# Patient Record
Sex: FEMALE | Race: BLACK OR AFRICAN AMERICAN | NOT HISPANIC OR LATINO | Employment: FULL TIME | URBAN - METROPOLITAN AREA
[De-identification: names, ages, dates, MRNs, and addresses within clinical notes are randomized per-mention and may not be internally consistent; named-entity substitution may affect disease eponyms.]

---

## 2017-01-02 DIAGNOSIS — F51.01 PRIMARY INSOMNIA: Primary | ICD-10-CM

## 2017-01-03 RX ORDER — ZOLPIDEM TARTRATE 10 MG/1
10 TABLET ORAL
Qty: 30 TABLET | Refills: 1 | OUTPATIENT
Start: 2017-01-03

## 2017-01-09 ENCOUNTER — TELEPHONE (OUTPATIENT)
Dept: FAMILY MEDICINE | Facility: CLINIC | Age: 52
End: 2017-01-09

## 2017-01-09 NOTE — TELEPHONE ENCOUNTER
Pt advised Stella WORKMAN has not seen her for this issue.  She has hx of bad cramping and her other provider would just do a note for her.  Advised Stella WORKMAN advised her to come in for physical in one month in June.  No appointment made.  Advised she can't do letter for problem she has not evaluated for .  Appointment made for tomorrow.  Miriam Gutierrez RN

## 2017-01-09 NOTE — TELEPHONE ENCOUNTER
Patient is requesting a note to excuse her from work yesterday she was having a lot of cramps   Please call to advice  Thank you

## 2017-01-10 ENCOUNTER — OFFICE VISIT (OUTPATIENT)
Dept: FAMILY MEDICINE | Facility: CLINIC | Age: 52
End: 2017-01-10
Payer: COMMERCIAL

## 2017-01-10 ENCOUNTER — TELEPHONE (OUTPATIENT)
Dept: FAMILY MEDICINE | Facility: CLINIC | Age: 52
End: 2017-01-10

## 2017-01-10 VITALS
SYSTOLIC BLOOD PRESSURE: 119 MMHG | DIASTOLIC BLOOD PRESSURE: 71 MMHG | HEIGHT: 66 IN | OXYGEN SATURATION: 97 % | WEIGHT: 186 LBS | BODY MASS INDEX: 29.89 KG/M2 | HEART RATE: 71 BPM | TEMPERATURE: 97.9 F

## 2017-01-10 DIAGNOSIS — Z23 NEED FOR PROPHYLACTIC VACCINATION WITH TETANUS-DIPHTHERIA (TD): ICD-10-CM

## 2017-01-10 DIAGNOSIS — F51.01 PRIMARY INSOMNIA: Primary | ICD-10-CM

## 2017-01-10 DIAGNOSIS — Z12.4 SCREENING FOR MALIGNANT NEOPLASM OF CERVIX: ICD-10-CM

## 2017-01-10 DIAGNOSIS — N94.6 DYSMENORRHEA: ICD-10-CM

## 2017-01-10 DIAGNOSIS — Z23 NEED FOR PROPHYLACTIC VACCINATION AND INOCULATION AGAINST INFLUENZA: ICD-10-CM

## 2017-01-10 DIAGNOSIS — Z00.01 ENCOUNTER FOR ROUTINE ADULT HEALTH EXAMINATION WITH ABNORMAL FINDINGS: Primary | ICD-10-CM

## 2017-01-10 DIAGNOSIS — Z13.1 SCREENING FOR DIABETES MELLITUS: ICD-10-CM

## 2017-01-10 DIAGNOSIS — Z12.11 SCREEN FOR COLON CANCER: ICD-10-CM

## 2017-01-10 PROCEDURE — 99396 PREV VISIT EST AGE 40-64: CPT | Performed by: PHYSICIAN ASSISTANT

## 2017-01-10 PROCEDURE — 87624 HPV HI-RISK TYP POOLED RSLT: CPT | Performed by: PHYSICIAN ASSISTANT

## 2017-01-10 PROCEDURE — G0145 SCR C/V CYTO,THINLAYER,RESCR: HCPCS | Performed by: PHYSICIAN ASSISTANT

## 2017-01-10 RX ORDER — ZOLPIDEM TARTRATE 10 MG/1
10 TABLET ORAL
Qty: 30 TABLET | Refills: 1 | Status: SHIPPED | OUTPATIENT
Start: 2017-01-10 | End: 2018-01-26

## 2017-01-10 NOTE — NURSING NOTE
"Chief Complaint   Patient presents with     Physical       Initial /71 mmHg  Pulse 71  Temp(Src) 97.9  F (36.6  C) (Oral)  Ht 5' 6\" (1.676 m)  Wt 186 lb (84.369 kg)  BMI 30.04 kg/m2  SpO2 97%  LMP 01/06/2017 Estimated body mass index is 30.04 kg/(m^2) as calculated from the following:    Height as of this encounter: 5' 6\" (1.676 m).    Weight as of this encounter: 186 lb (84.369 kg)..  BP completed using cuff size: shraddha Clay M.A.      "

## 2017-01-10 NOTE — Clinical Note
[facility]  [address] [facility] [address] [phone]      January 20, 2017    Benita Powers  [address] [facility] [address]    Dear Benita,  We are happy to inform you that your PAP smear result from 1/10/17 is normal.  We are now able to do a follow up test on PAP smears. The DNA test is for HPV (Human Papilloma Virus). Cervical cancer is closely linked with certain types of HPV. Your result showed no evidence of high risk HPV.  Therefore we recommend you return in 3 years for your next pap smear.  You will still need to return to the clinic every year for an annual exam and other preventive tests.  Please contact the clinic with any questions.  Sincerely,  Stella Santiago PA-C/david

## 2017-01-10 NOTE — MR AVS SNAPSHOT
After Visit Summary   1/10/2017    Benita Powers    MRN: [id number]           Patient Information     Date Of Birth          1965        Visit Information        Provider Department      1/10/2017 9:40 AM Stella Santiago PA-C [facility]        Today's Diagnoses     Encounter for routine adult health examination with abnormal findings    -  1     Dysmenorrhea         Screen for colon cancer         Screening for malignant neoplasm of cervix         Need for prophylactic vaccination and inoculation against influenza         Need for prophylactic vaccination with tetanus-diphtheria (TD)         Screening for diabetes mellitus           Care Instructions    Please return fasting for labs      Preventive Health Recommendations  Female Ages 50 - 64    Yearly exam: See your health care provider every year in order to  o Review health changes.   o Discuss preventive care.    o Review your medicines if your doctor has prescribed any.      Get a Pap test every three years (unless you have an abnormal result and your provider advises testing more often).    If you get Pap tests with HPV test, you only need to test every 5 years, unless you have an abnormal result.     You do not need a Pap test if your uterus was removed (hysterectomy) and you have not had cancer.    You should be tested each year for STDs (sexually transmitted diseases) if you're at risk.     Have a mammogram every 1 to 2 years.    Have a colonoscopy at age 50, or have a yearly FIT test (stool test). These exams screen for colon cancer.      Have a cholesterol test every 5 years, or more often if advised.    Have a diabetes test (fasting glucose) every three years. If you are at risk for diabetes, you should have this test more often.     If you are at risk for osteoporosis (brittle bone disease), think about having a bone density scan (DEXA).    Shots: Get a flu shot each year. Get a tetanus shot every 10  "years.    Nutrition:     Eat at least 5 servings of fruits and vegetables each day.    Eat whole-grain bread, whole-wheat pasta and brown rice instead of white grains and rice.    Talk to your provider about Calcium and Vitamin D.     Lifestyle    Exercise at least 150 minutes a week (30 minutes a day, 5 days a week). This will help you control your weight and prevent disease.    Limit alcohol to one drink per day.    No smoking.     Wear sunscreen to prevent skin cancer.     See your dentist every six months for an exam and cleaning.    See your eye doctor every 1 to 2 years.            Follow-ups after your visit        Future tests that were ordered for you today     Open Future Orders        Priority Expected Expires Ordered    Lipid panel reflex to direct LDL Routine  1/10/2018 1/10/2017    Comprehensive metabolic panel Routine  1/10/2018 1/10/2017            Who to contact     If you have questions or need follow up information about today's clinic visit or your schedule please contact [facility] directly at [phone].  Normal or non-critical lab and imaging results will be communicated to you by ClearDATAhart, letter or phone within 4 business days after the clinic has received the results. If you do not hear from us within 7 days, please contact the clinic through Brootat or phone. If you have a critical or abnormal lab result, we will notify you by phone as soon as possible.  Submit refill requests through Kanbanize or call your pharmacy and they will forward the refill request to us. Please allow 3 business days for your refill to be completed.          Additional Information About Your Visit        [email or web address] Information     Kanbanize lets you send messages to your doctor, view your test results, renew your prescriptions, schedule appointments and more. To sign up, go to [email or web address] . Click on \"Log in\" on the left side of the screen, which will take you to the Welcome page. Then click on " "\"Sign up Now\" on the right side of the page.     You will be asked to enter the access code listed below, as well as some personal information. Please follow the directions to create your username and password.     Your access code is: [email or web address]  Expires: 4/10/2017 10:22 AM     Your access code will  in 90 days. If you need help or a new code, please call your [facility] or [phone].        Care EveryWhere ID     This is your Care EveryWhere ID. This could be used by other organizations to access your [facility] medical records  [id number]        Your Vitals Were     Pulse Temperature Height BMI (Body Mass Index) Pulse Oximetry Last Period    71 97.9  F (36.6  C) (Oral) 5' 6\" (1.676 m) 30.04 kg/m2 97% 2017       Blood Pressure from Last 3 Encounters:   01/10/17 119/71   16 131/78    Weight from Last 3 Encounters:   01/10/17 186 lb (84.369 kg)   16 188 lb 4.8 oz (85.412 kg)              We Performed the Following     HPV High Risk Types DNA Cervical     Pap imaged thin layer screen with HPV - recommended age 30 - 65 years (select HPV order below)        Primary Care Provider Office Phone # Fax #    [facility] [phone] [address] [facility] [facility] [address]        Thank you!     Thank you for choosing [facility]  for your care. Our goal is always to provide you with excellent care. Hearing back from our patients is one way we can continue to improve our services. Please take a few minutes to complete the written survey that you may receive in the mail after your visit with us. Thank you!             Your Updated Medication List - Protect others around you: Learn how to safely use, store and throw away your medicines at www.disposemymeds.org.          This list is accurate as of: 1/10/17 10:22 AM.  Always use your most recent med list.                   Brand Name Dispense Instructions for use    albuterol 108 (90 BASE) MCG/ACT " Inhaler    PROAIR HFA/PROVENTIL HFA/VENTOLIN HFA    1 Inhaler    Inhale 2 puffs into the lungs every 6 hours as needed for shortness of breath / dyspnea or wheezing       amLODIPine 10 MG tablet    NORVASC    90 tablet    Take 1 tablet (10 mg) by mouth daily       flax seed oil 1000 MG capsule      Take 1 capsule by mouth daily       hydrochlorothiazide 25 MG tablet    HYDRODIURIL    90 tablet    Take 1 tablet (25 mg) by mouth daily       HYDROcodone-acetaminophen 5-325 MG per tablet    NORCO         lisinopril 40 MG tablet    PRINIVIL/ZESTRIL    90 tablet    Take 1 tablet (40 mg) by mouth daily       methocarbamol 500 MG tablet    ROBAXIN    60 tablet    Take 2 tablets (1,000 mg) by mouth 3 times daily as needed for muscle spasms       MULTIVITAMIN ADULT PO          OMEGA-3 FISH OIL PO          omeprazole 20 MG CR capsule    priLOSEC    90 capsule    Take 1 capsule (20 mg) by mouth daily       zolpidem 10 MG tablet    AMBIEN    30 tablet    Take 1 tablet (10 mg) by mouth nightly as needed for sleep

## 2017-01-10 NOTE — Clinical Note
[facility]  [address] [facility] [address]  Phone: [phone]    January 10, 2017        Benita Powers  [address] [facility] [address]          To whom it may concern:    RE: Benita Powers    Patient was seen and treated today at our clinic.  She may be excused from work Sunday the 8th.  She gets very bad menstrual cramping and may need to be off up to 2 full days every month as needed.     Please contact me for questions or concerns.      Sincerely,        Stella Santiago PA-C

## 2017-01-10 NOTE — PROGRESS NOTES
SUBJECTIVE:     CC: Benita Powers is an 51 year old woman who presents for preventive health visit.     Healthy Habits:    Do you get at least three servings of calcium containing foods daily (dairy, green leafy vegetables, etc.)? yes    Amount of exercise or daily activities, outside of work: two times per week    Problems taking medications regularly No    Medication side effects: No    Have you had an eye exam in the past two years? no    Do you see a dentist twice per year? yes    Do you have sleep apnea, excessive snoring or daytime drowsiness?no        Cramping with periods--needs note to be off of work.  May need FMLA but does not have that currently.  Up to 2 x per month.  Gets severe cramps, unable to leave house x 2 days. Takes several OTC to help. Has had for years.   Ob history: 4 pregnancies, 4 children.   Had mammogram last year.   Colonoscopy-had age 50.  Had diverticulosis.  Will abstract.  Had at [facility].  [facility] she thinks was location.   Due in 10 years.   Due for fasting labs. Due for pap.     Today's PHQ-2 Score:   PHQ-2 ( 1999 Pfizer) 6/17/2016   Q1: Little interest or pleasure in doing things 0   Q2: Feeling down, depressed or hopeless 0   PHQ-2 Score 0       Abuse: Current or Past(Physical, Sexual or Emotional)- No  Do you feel safe in your environment - Yes    Social History   Substance Use Topics     Smoking status: Former Smoker     Smokeless tobacco: Not on file     Alcohol Use: Yes      Comment: rare     The patient does not drink >3 drinks per day nor >7 drinks per week.    No results for input(s): CHOL, HDL, LDL, TRIG, CHOLHDLRATIO, NHDL in the last 85559 hours.    Reviewed orders with patient.  Reviewed health maintenance and updated orders accordingly - Yes    Mammo Decision Support:  Patient over age 50, mutual decision to screen reflected in health maintenance.    Pertinent mammograms are reviewed under the imaging tab.  History of abnormal Pap smear: NO - age 30- 65 PAP  "every 3 years recommended  All Histories reviewed and updated in Epic.  Past Medical History   Diagnosis Date     Hypertension      Uncomplicated asthma      High cholesterol       Past Surgical History   Procedure Laterality Date     Eye surgery       toe surgery     Obstetric History       T0      TAB0   SAB0   E0   M0   L0       # Outcome Date GA Lbr Panchito/2nd Weight Sex Delivery Anes PTL Lv   4 Para            3 Para            2 Para            1 Para                   ROS:  C: NEGATIVE for fever, chills, change in weight  I: NEGATIVE for worrisome rashes, moles or lesions  E: NEGATIVE for vision changes or irritation  ENT: NEGATIVE for ear, mouth and throat problems  R: NEGATIVE for significant cough or SOB  B: NEGATIVE for masses, tenderness or discharge  CV: NEGATIVE for chest pain, palpitations or peripheral edema  GI: NEGATIVE for nausea, abdominal pain, heartburn, or change in bowel habits  M: NEGATIVE for significant arthralgias or myalgia  N: NEGATIVE for weakness, dizziness or paresthesias  P: NEGATIVE for changes in mood or affect    Problem list, Medication list, Allergies, and Medical/Social/Surgical histories reviewed in [facility] and updated as appropriate.  OBJECTIVE:     /71 mmHg  Pulse 71  Temp(Src) 97.9  F (36.6  C) (Oral)  Ht 5' 6\" (1.676 m)  Wt 186 lb (84.369 kg)  BMI 30.04 kg/m2  SpO2 97%  LMP 2017  EXAM:  GENERAL: healthy, alert and no distress  EYES: Eyes grossly normal to inspection, PERRL and conjunctivae and sclerae normal  HENT: ear canals and TM's normal, nose and mouth without ulcers or lesions  NECK: no adenopathy, no asymmetry, masses, or scars and thyroid normal to palpation  RESP: lungs clear to auscultation - no rales, rhonchi or wheezes  BREAST: normal without masses, tenderness or nipple discharge and no palpable axillary masses or adenopathy  CV: regular rate and rhythm, normal S1 S2, no S3 or S4, no murmur, click or rub, no peripheral edema and " "peripheral pulses strong  ABDOMEN: soft, nontender, no hepatosplenomegaly, no masses and bowel sounds normal   (female): normal female external genitalia, normal urethral meatus, vaginal mucosa pink, moist, well rugated, and normal cervix/adnexa/uterus without masses or discharge  MS: no gross musculoskeletal defects noted, no edema  SKIN: no suspicious lesions or rashes  NEURO: Normal strength and tone, mentation intact and speech normal  PSYCH: mentation appears normal, affect normal/bright    ASSESSMENT/PLAN:     1. Encounter for routine adult health examination with abnormal findings    - Lipid panel reflex to direct LDL; Future  - HPV High Risk Types DNA Cervical    2. Dysmenorrhea      3. Screen for colon cancer      4. Screening for malignant neoplasm of cervix    - Pap imaged thin layer screen with HPV - recommended age 30 - 65 years (select HPV order below)    5. Need for prophylactic vaccination and inoculation against influenza      6. Need for prophylactic vaccination with tetanus-diphtheria (TD)      7. Screening for diabetes mellitus    - Comprehensive metabolic panel; Future    COUNSELING:   Reviewed preventive health counseling, as reflected in patient instructions       Regular exercise         reports that she has quit smoking. She does not have any smokeless tobacco history on file.    Estimated body mass index is 29.48 kg/(m^2) as calculated from the following:    Height as of 6/17/16: 5' 7\" (1.702 m).    Weight as of 6/17/16: 188 lb 4.8 oz (85.412 kg).   Weight management plan: Discussed healthy diet and exercise guidelines and patient will follow up in 12 months in clinic to re-evaluate.     Patient Instructions   Please return fasting for labs      Preventive Health Recommendations  Female Ages 50 - 64    Yearly exam: See your health care provider every year in order to  o Review health changes.   o Discuss preventive care.    o Review your medicines if your doctor has prescribed " any.      Get a Pap test every three years (unless you have an abnormal result and your provider advises testing more often).    If you get Pap tests with HPV test, you only need to test every 5 years, unless you have an abnormal result.     You do not need a Pap test if your uterus was removed (hysterectomy) and you have not had cancer.    You should be tested each year for STDs (sexually transmitted diseases) if you're at risk.     Have a mammogram every 1 to 2 years.    Have a colonoscopy at age 50, or have a yearly FIT test (stool test). These exams screen for colon cancer.      Have a cholesterol test every 5 years, or more often if advised.    Have a diabetes test (fasting glucose) every three years. If you are at risk for diabetes, you should have this test more often.     If you are at risk for osteoporosis (brittle bone disease), think about having a bone density scan (DEXA).    Shots: Get a flu shot each year. Get a tetanus shot every 10 years.    Nutrition:     Eat at least 5 servings of fruits and vegetables each day.    Eat whole-grain bread, whole-wheat pasta and brown rice instead of white grains and rice.    Talk to your provider about Calcium and Vitamin D.     Lifestyle    Exercise at least 150 minutes a week (30 minutes a day, 5 days a week). This will help you control your weight and prevent disease.    Limit alcohol to one drink per day.    No smoking.     Wear sunscreen to prevent skin cancer.     See your dentist every six months for an exam and cleaning.    See your eye doctor every 1 to 2 years.              Counseling Resources:  ATP IV Guidelines  Pooled Cohorts Equation Calculator  Breast Cancer Risk Calculator  FRAX Risk Assessment  ICSI Preventive Guidelines  Dietary Guidelines for Americans, 2010  USDA's MyPlate  ASA Prophylaxis  Lung CA Screening    Stella Santiago PA-C  [facility]

## 2017-01-10 NOTE — TELEPHONE ENCOUNTER
Controlled Substance Refill Request for ambien  Problem List Complete:  No     PROVIDER TO CONSIDER COMPLETION OF PROBLEM LIST AND OVERVIEW/CONTROLLED SUBSTANCE AGREEMENT    Last Written Prescription Date:  6-7-16  Last Fill Quantity: 30,   # refills: 1    Last Office Visit with [facility] primary care provider: 1-10-17    Future Office visit:     Controlled substance agreement on file: No.     Processing:  Fax Rx to [facility] pharmacy   checked in past 6 months?  Yes 1/10/17   Miriam Gutierrez RN

## 2017-01-10 NOTE — TELEPHONE ENCOUNTER
Faxed RX to [facility] in [place]. Left message for patient to call back.Sophie Martino MA/FRANCESCO

## 2017-01-10 NOTE — Clinical Note
[facility]  [address] [facility] [id number] [phone]      January 20, 2017    Benita Powers  [address] [facility] [address]    Dear Benita,  We are happy to inform you that your PAP smear result from *** is normal.  We are now able to do a follow up test on PAP smears. The DNA test is for HPV (Human Papilloma Virus). Cervical cancer is closely linked with certain types of HPV. Your result showed {HPV EVIDENCE:864513}  Therefore we recommend you return in {HPV 1-5 YEAR:291141} for your next pap smear.  You will still need to return to the clinic every year for an annual exam and other preventive tests.  Please contact the clinic with any questions.  Sincerely,  Stella Santiago PA-C

## 2017-01-10 NOTE — PATIENT INSTRUCTIONS
Please return fasting for labs      Preventive Health Recommendations  Female Ages 50 - 64    Yearly exam: See your health care provider every year in order to  o Review health changes.   o Discuss preventive care.    o Review your medicines if your doctor has prescribed any.      Get a Pap test every three years (unless you have an abnormal result and your provider advises testing more often).    If you get Pap tests with HPV test, you only need to test every 5 years, unless you have an abnormal result.     You do not need a Pap test if your uterus was removed (hysterectomy) and you have not had cancer.    You should be tested each year for STDs (sexually transmitted diseases) if you're at risk.     Have a mammogram every 1 to 2 years.    Have a colonoscopy at age 50, or have a yearly FIT test (stool test). These exams screen for colon cancer.      Have a cholesterol test every 5 years, or more often if advised.    Have a diabetes test (fasting glucose) every three years. If you are at risk for diabetes, you should have this test more often.     If you are at risk for osteoporosis (brittle bone disease), think about having a bone density scan (DEXA).    Shots: Get a flu shot each year. Get a tetanus shot every 10 years.    Nutrition:     Eat at least 5 servings of fruits and vegetables each day.    Eat whole-grain bread, whole-wheat pasta and brown rice instead of white grains and rice.    Talk to your provider about Calcium and Vitamin D.     Lifestyle    Exercise at least 150 minutes a week (30 minutes a day, 5 days a week). This will help you control your weight and prevent disease.    Limit alcohol to one drink per day.    No smoking.     Wear sunscreen to prevent skin cancer.     See your dentist every six months for an exam and cleaning.    See your eye doctor every 1 to 2 years.

## 2017-01-12 LAB
COPATH REPORT: NORMAL
PAP: NORMAL

## 2017-01-16 ENCOUNTER — TELEPHONE (OUTPATIENT)
Dept: FAMILY MEDICINE | Facility: CLINIC | Age: 52
End: 2017-01-16

## 2017-01-16 NOTE — Clinical Note
[facility]  [address] [facility] [address]  [phone]    February 1, 2017      Benita Powers  [address] [address] [facility] [place] [address]      Dear Benita,     Your clinic record indicates that you are due for an asthma update. We have a survey tool called an ACT (or Asthma Control Test) we use to measure the level of control of your asthma. Please complete the enclosed questionnaire and mail it back to us in the self-addressed stamped envelope.     If you have questions about this letter please contact your provider.     Sincerely,       Your [facility] Team

## 2017-01-16 NOTE — TELEPHONE ENCOUNTER
Patient was in to see Stella Santiago on 01- and has the diagnoses of Asthma. Patient is due for an ACT. Please complete. Thank you.

## 2017-01-19 LAB
FINAL DIAGNOSIS: NORMAL
HPV HR 12 DNA CVX QL NAA+PROBE: NEGATIVE
HPV16 DNA SPEC QL NAA+PROBE: NEGATIVE
HPV18 DNA SPEC QL NAA+PROBE: NEGATIVE
SPECIMEN DESCRIPTION: NORMAL

## 2017-01-20 DIAGNOSIS — Z13.1 SCREENING FOR DIABETES MELLITUS: ICD-10-CM

## 2017-01-20 DIAGNOSIS — Z00.01 ENCOUNTER FOR ROUTINE ADULT HEALTH EXAMINATION WITH ABNORMAL FINDINGS: ICD-10-CM

## 2017-01-20 LAB
ALBUMIN SERPL-MCNC: 3.4 G/DL (ref 3.4–5)
ALP SERPL-CCNC: 55 U/L (ref 40–150)
ALT SERPL W P-5'-P-CCNC: 19 U/L (ref 0–50)
ANION GAP SERPL CALCULATED.3IONS-SCNC: 8 MMOL/L (ref 3–14)
AST SERPL W P-5'-P-CCNC: 16 U/L (ref 0–45)
BILIRUB SERPL-MCNC: 0.3 MG/DL (ref 0.2–1.3)
BUN SERPL-MCNC: 8 MG/DL (ref 7–30)
CALCIUM SERPL-MCNC: 8.7 MG/DL (ref 8.5–10.1)
CHLORIDE SERPL-SCNC: 104 MMOL/L (ref 94–109)
CHOLEST SERPL-MCNC: 237 MG/DL
CO2 SERPL-SCNC: 26 MMOL/L (ref 20–32)
CREAT SERPL-MCNC: 0.81 MG/DL (ref 0.52–1.04)
GFR SERPL CREATININE-BSD FRML MDRD: 74 ML/MIN/1.7M2
GLUCOSE SERPL-MCNC: 88 MG/DL (ref 70–99)
HDLC SERPL-MCNC: 57 MG/DL
LDLC SERPL CALC-MCNC: 136 MG/DL
NONHDLC SERPL-MCNC: 180 MG/DL
POTASSIUM SERPL-SCNC: 3.9 MMOL/L (ref 3.4–5.3)
PROT SERPL-MCNC: 7.2 G/DL (ref 6.8–8.8)
SODIUM SERPL-SCNC: 138 MMOL/L (ref 133–144)
TRIGL SERPL-MCNC: 221 MG/DL

## 2017-01-20 PROCEDURE — 80053 COMPREHEN METABOLIC PANEL: CPT | Performed by: PHYSICIAN ASSISTANT

## 2017-01-20 PROCEDURE — 36415 COLL VENOUS BLD VENIPUNCTURE: CPT | Performed by: PHYSICIAN ASSISTANT

## 2017-01-20 PROCEDURE — 80061 LIPID PANEL: CPT | Performed by: PHYSICIAN ASSISTANT

## 2017-01-20 NOTE — Clinical Note
"[facility]  [address] [address] [phone]        January 23, 2017    Benita Powers  [address] [address]            Dear Benita,    It was a pleasure to see you at your recent office visit.  Your test results are listed below. Kidney function normal. Liver function normal. No diabetes, blood sugar normal. Your LDL or \"bad\" cholesterol was somewhat elevated at 136.  Your HDL or \"good\" cholesterol was great at 57, the higher this number the better.  Your triglycerides or \"fatty acids\" were high at 221.  To improve this,  I would recommend exercising for at least 30 minutes 5 times a week or for 50 minutes 3 times a week.  Brisk walking counts for this, it does not need to be vigorous exercise.  Also a diet high in vegetables, fruits, fiber, and whole grains will help.  If you are not currently taking a fish oil supplement (which is over-the-counter) start 1 gram fish oil supplement daily.      We will re-check your cholesterol in 12 months to assess your progress.          If you have any questions or concerns, please call the clinic at [phone].    Sincerely,  Stella Santiago PA-C    Results for orders placed or performed in visit on 01/20/17   Lipid panel reflex to direct LDL   Result Value Ref Range    Cholesterol 237 (H) <200 mg/dL    Triglycerides 221 (H) <150 mg/dL    HDL Cholesterol 57 >49 mg/dL    LDL Cholesterol Calculated 136 (H) <100 mg/dL    Non HDL Cholesterol 180 (H) <130 mg/dL   Comprehensive metabolic panel   Result Value Ref Range    Sodium 138 133 - 144 mmol/L    Potassium 3.9 3.4 - 5.3 mmol/L    Chloride 104 94 - 109 mmol/L    Carbon Dioxide 26 20 - 32 mmol/L    Anion Gap 8 3 - 14 mmol/L    Glucose 88 70 - 99 mg/dL    Urea Nitrogen 8 7 - 30 mg/dL    Creatinine 0.81 0.52 - 1.04 mg/dL    GFR Estimate 74 >60 mL/min/1.7m2    GFR Estimate If Black 90 >60 mL/min/1.7m2    Calcium 8.7 8.5 - 10.1 mg/dL    Bilirubin Total 0.3 0.2 - 1.3 mg/dL    " Albumin 3.4 3.4 - 5.0 g/dL    Protein Total 7.2 6.8 - 8.8 g/dL    Alkaline Phosphatase 55 40 - 150 U/L    ALT 19 0 - 50 U/L    AST 16 0 - 45 U/L

## 2017-01-20 NOTE — PROGRESS NOTES
"Quick Note:    Dear Benita,   It was a pleasure to see you at your recent office visit. Your test results are listed below. Kidney function normal. Liver function normal. No diabetes, blood sugar normal. Your LDL or \"bad\" cholesterol was somewhat elevated at 136. Your HDL or \"good\" cholesterol was great at 57, the higher this number the better. Your triglycerides or \"fatty acids\" were high at 221. To improve this, I would recommend exercising for at least 30 minutes 5 times a week or for 50 minutes 3 times a week. Brisk walking counts for this, it does not need to be vigorous exercise. Also a diet high in vegetables, fruits, fiber, and whole grains will help. If you are not currently taking a fish oil supplement (which is over-the-counter) start 1 gram fish oil supplement daily.     We will re-check your cholesterol in 12 months to assess your progress.       If you have any questions or concerns, please call the clinic at [phone].    Sincerely,  Stella Santiago PA-C      ______  "

## 2017-01-27 ENCOUNTER — TELEPHONE (OUTPATIENT)
Dept: FAMILY MEDICINE | Facility: CLINIC | Age: 52
End: 2017-01-27

## 2017-02-13 ENCOUNTER — TELEPHONE (OUTPATIENT)
Dept: FAMILY MEDICINE | Facility: CLINIC | Age: 52
End: 2017-02-13

## 2017-02-13 DIAGNOSIS — I10 BENIGN ESSENTIAL HYPERTENSION: ICD-10-CM

## 2017-02-13 RX ORDER — AMLODIPINE BESYLATE 10 MG/1
10 TABLET ORAL DAILY
Qty: 90 TABLET | Refills: 3 | Status: SHIPPED | OUTPATIENT
Start: 2017-02-13 | End: 2018-01-26

## 2017-02-13 RX ORDER — LISINOPRIL 40 MG/1
40 TABLET ORAL DAILY
Qty: 90 TABLET | Refills: 3 | Status: SHIPPED | OUTPATIENT
Start: 2017-02-13 | End: 2018-01-26

## 2017-02-13 NOTE — TELEPHONE ENCOUNTER
Patient is calling for refill RX: amLODIPine (NORVASC) 10 MG tablet and lisinopril (PRINIVIL/ZESTRIL) 40 MG tablet. Thank you.

## 2017-06-02 DIAGNOSIS — K21.9 GASTROESOPHAGEAL REFLUX DISEASE WITHOUT ESOPHAGITIS: ICD-10-CM

## 2017-06-02 DIAGNOSIS — F51.01 PRIMARY INSOMNIA: ICD-10-CM

## 2017-06-02 RX ORDER — ZOLPIDEM TARTRATE 10 MG/1
TABLET ORAL
Qty: 30 TABLET | Refills: 0 | OUTPATIENT
Start: 2017-06-02

## 2017-06-02 NOTE — TELEPHONE ENCOUNTER
Controlled Substance Refill Request for zolpidem  Problem List Complete:  No     PROVIDER TO CONSIDER COMPLETION OF PROBLEM LIST AND OVERVIEW/CONTROLLED SUBSTANCE AGREEMENT    Last Written Prescription Date:  1/10/17  Last Fill Quantity: 30,   # refills: 1    Last Office Visit with G primary care provider: 1/10/17    Future Office visit:     Controlled substance agreement on file: No.     Processing:  Fax Rx to [facility] pharmacy   checked in past 6 months?  Yes 1-10-17   Miriam Gutierrez RN

## 2017-09-11 ENCOUNTER — TELEPHONE (OUTPATIENT)
Dept: FAMILY MEDICINE | Facility: CLINIC | Age: 52
End: 2017-09-11

## 2017-11-14 ENCOUNTER — TELEPHONE (OUTPATIENT)
Dept: FAMILY MEDICINE | Facility: CLINIC | Age: 52
End: 2017-11-14

## 2017-11-14 NOTE — LETTER
[facility]  [address] [facility] [address]  Phone: [phone]    November 14, 2017        Benita Powers  [address], [place] [address]          To whom it may concern:    RE: Benita Powers      Patient missed work today due to medical illness. May return without restrictions as of tomorrow.     Please contact me for questions or concerns.      Sincerely,        Stella Santiago PA-C

## 2017-11-14 NOTE — TELEPHONE ENCOUNTER
Ok but patient needs OV before any more notes or things, has been almost a year since I've seen her.   Stella Santiago PA-C

## 2017-11-14 NOTE — TELEPHONE ENCOUNTER
Patient is calling to get a note for being off work today, as her [facility] paperwork  . She would like to  this note tomorrow morning. Please call to advise. Thank you

## 2017-11-14 NOTE — TELEPHONE ENCOUNTER
Called and spoke to patient. She said that she needs a note for today since she did not go to work because of menstrual cramps. Is this something you can do?Sophie Martino MA/TC

## 2018-01-22 NOTE — PATIENT INSTRUCTIONS
Do not take ambien with pain medicine (vicodin/norco).    Recheck 6 months if needing more ambien or pain medications, otherwise one year    I will follow up with labs.         Preventive Health Recommendations  Female Ages 50 - 64    Yearly exam: See your health care provider every year in order to  o Review health changes.   o Discuss preventive care.    o Review your medicines if your doctor has prescribed any.      Get a Pap test every three years (unless you have an abnormal result and your provider advises testing more often).    If you get Pap tests with HPV test, you only need to test every 5 years, unless you have an abnormal result.     You do not need a Pap test if your uterus was removed (hysterectomy) and you have not had cancer.    You should be tested each year for STDs (sexually transmitted diseases) if you're at risk.     Have a mammogram every 1 to 2 years.    Have a colonoscopy at age 50, or have a yearly FIT test (stool test). These exams screen for colon cancer.      Have a cholesterol test every 5 years, or more often if advised.    Have a diabetes test (fasting glucose) every three years. If you are at risk for diabetes, you should have this test more often.     If you are at risk for osteoporosis (brittle bone disease), think about having a bone density scan (DEXA).    Shots: Get a flu shot each year. Get a tetanus shot every 10 years.    Nutrition:     Eat at least 5 servings of fruits and vegetables each day.    Eat whole-grain bread, whole-wheat pasta and brown rice instead of white grains and rice.    Talk to your provider about Calcium and Vitamin D.     Lifestyle    Exercise at least 150 minutes a week (30 minutes a day, 5 days a week). This will help you control your weight and prevent disease.    Limit alcohol to one drink per day.    No smoking.     Wear sunscreen to prevent skin cancer.     See your dentist every six months for an exam and cleaning.    See your eye doctor every 1 to  2 years.

## 2018-01-22 NOTE — PROGRESS NOTES
SUBJECTIVE:   CC: Benita Powers is an 52 year old woman who presents for preventive health visit.     Healthy Habits:    Answers for HPI/ROS submitted by the patient on 1/26/2018   Annual Exam:  Getting at least 3 servings of Calcium per day:: Yes  Bi-annual eye exam:: Yes  Dental care twice a year:: Yes  Sleep apnea or symptoms of sleep apnea:: None  Taking medications regularly:: Yes  Medication side effects:: Other  Additional concerns today:: YES  PHQ-2 Score: 1      Due for pap in 2020.   mammo due this summer.   Colon screening due 2025.   Had fasting labs last year.  Is fasting this year and would like rechecked.   Has been trying to eat less red meat due to high cholesterol. Has been off her blood pressure medications though,  She brought in home readings usually 118-140s over 80-s90s.  No chest pain, shortness of breath, edema, PND, or orthopnea. No dizziness or vision changes. No side effects from medications.     [place] seen in past year.   Insomnia-uses very rarely.  Needs refill. No  Side effects.    Vicodin/norco-takes for headaches PRN.  More stress lately  is on waiting list for kidney transplant. Has been over a year since last fill for this. Tries ibuprofen first for headaches.     Last period-two days ago. Still gets back cramps.  Needs FMLA if has to miss work for this. Has had before.   FMLA forms filled out for missing work due to severe menstrual cramps.  1-2 x per month, see FMLA forms in scanned chart.  Did same as last year.     Please print out asthma letter.Act today is 25. Well controlled.  Very mild.  Not needing inhaler.             Today's PHQ-2 Score:   PHQ-2 ( 1999 [employer]) 1/26/2018 1/10/2017   Q1: Little interest or pleasure in doing things 1 0   Q2: Feeling down, depressed or hopeless 0 0   PHQ-2 Score 1 0   Q1: Little interest or pleasure in doing things Several days -   Q2: Feeling down, depressed or hopeless Not at all -   PHQ-2 Score 1 -       Abuse:  Current or Past(Physical, Sexual or Emotional)- No  Do you feel safe in your environment - Yes    Social History   Substance Use Topics     Smoking status: Former Smoker     Smokeless tobacco: Never Used     Alcohol use Yes      Comment: rare     If you drink alcohol do you typically have >3 drinks per day or >7 drinks per week? No                     Reviewed orders with patient.  Reviewed health maintenance and updated orders accordingly - Yes  Labs reviewed in EPIC  BP Readings from Last 3 Encounters:   18 138/88   01/10/17 119/71   16 131/78    Wt Readings from Last 3 Encounters:   18 186 lb (84.4 kg)   01/10/17 186 lb (84.4 kg)   16 188 lb 4.8 oz (85.4 kg)                  Patient Active Problem List   Diagnosis     Benign essential hypertension     Mild intermittent asthma without complication     Primary insomnia     Dysmenorrhea     Episodic tension-type headache, not intractable     Past Surgical History:   Procedure Laterality Date     EYE SURGERY      toe surgery       Social History   Substance Use Topics     Smoking status: Former Smoker     Smokeless tobacco: Never Used     Alcohol use Yes      Comment: rare     Family History   Problem Relation Age of Onset     DIABETES Mother      Thyroid Disease Mother      Sarcoidosis Mother            Patient over age 50, mutual decision to screen reflected in health maintenance.    Pertinent mammograms are reviewed under the imaging tab.  History of abnormal Pap smear: NO - age 30- 65 PAP every 3 years recommended    Reviewed and updated as needed this visit by clinical staff  Tobacco  Allergies  Meds  Med Hx  Surg Hx  Fam Hx  Soc Hx        Reviewed and updated as needed this visit by Provider        Past Medical History:   Diagnosis Date     High cholesterol      Hypertension      Uncomplicated asthma       Past Surgical History:   Procedure Laterality Date     EYE SURGERY      toe surgery     Obstetric History        "T0      L0     SAB0   TAB0   Ectopic0   Multiple0   Live Births0       # Outcome Date GA Lbr Panchito/2nd Weight Sex Delivery Anes PTL Lv   4 Para            3 Para            2 Para            1 Para                   ROS:  C: NEGATIVE for fever, chills, change in weight  I: NEGATIVE for worrisome rashes, moles or lesions  E: NEGATIVE for vision changes or irritation  ENT: NEGATIVE for ear, mouth and throat problems  R: NEGATIVE for significant cough or SOB  B: NEGATIVE for masses, tenderness or discharge  CV: NEGATIVE for chest pain, palpitations or peripheral edema  GI: NEGATIVE for nausea, abdominal pain, heartburn, or change in bowel habits  M: NEGATIVE for significant arthralgias or myalgia  N: NEGATIVE for weakness, dizziness or paresthesias  P: NEGATIVE for changes in mood or affect     OBJECTIVE:   /88  Pulse 73  Temp 97.8  F (36.6  C) (Oral)  Ht 5' 6.5\" (1.689 m)  Wt 186 lb (84.4 kg)  LMP 2018  SpO2 99%  Breastfeeding? No  BMI 29.57 kg/m2  EXAM:  GENERAL: healthy, alert and no distress  EYES: Eyes grossly normal to inspection, PERRL and conjunctivae and sclerae normal  HENT: ear canals and TM's normal, nose and mouth without ulcers or lesions  NECK: no adenopathy, no asymmetry, masses, or scars and thyroid normal to palpation  RESP: lungs clear to auscultation - no rales, rhonchi or wheezes  CV: regular rate and rhythm, normal S1 S2, no S3 or S4, no murmur, click or rub, no peripheral edema and peripheral pulses strong  ABDOMEN: soft, nontender, no hepatosplenomegaly, no masses and bowel sounds normal  MS: no gross musculoskeletal defects noted, no edema  SKIN: no suspicious lesions or rashes  NEURO: Normal strength and tone, mentation intact and speech normal  PSYCH: mentation appears normal, affect normal/bright    ASSESSMENT/PLAN:   1. Encounter for routine adult health examination with abnormal findings      Bad headaches or for bad menstrual cramps:   - HYDROcodone-acetaminophen " (NORCO) 5-325 MG per tablet; Take 1 tablet by mouth every 8 hours as needed for moderate to severe pain  Dispense: 20 tablet; Refill: 0  -Narcotic medications can be addicting and therefore are used for short term pain control only.  They are not intended for long-term use.    -Take them only for severe pain as needed.    -Never mix with alcohol.    -Do not drive after taking this medication.    -No refills without an office visit. No replacements will be given.    -Keep these medications kept in a safe location.  You are responsible for them.  -Do not give them to anyone else.  They are prescribed to you only.         2. Primary insomnia    - zolpidem (AMBIEN) 10 MG tablet; Take 1 tablet (10 mg) by mouth nightly as needed for sleep  Dispense: 30 tablet; Refill: 5    See patient instructions below for more plan.    3. Benign essential hypertension    - Lipid panel reflex to direct LDL Fasting  - Comprehensive metabolic panel  - amLODIPine (NORVASC) 10 MG tablet; Take 1 tablet (10 mg) by mouth daily  Dispense: 90 tablet; Refill: 1  - lisinopril (PRINIVIL/ZESTRIL) 40 MG tablet; Take 1 tablet (40 mg) by mouth daily  Dispense: 90 tablet; Refill: 1    To goal recheck 1 year  4. Mild intermittent asthma without complication  stable    5. Dysmenorrhea  FMLA done    6. Episodic tension-type headache, not intractable    Patient Instructions   Do not take ambien with pain medicine (vicodin/norco).    Recheck 6 months if needing more ambien or pain medications, otherwise one year    I will follow up with labs.         Preventive Health Recommendations  Female Ages 50 - 64    Yearly exam: See your health care provider every year in order to  o Review health changes.   o Discuss preventive care.    o Review your medicines if your doctor has prescribed any.      Get a Pap test every three years (unless you have an abnormal result and your provider advises testing more often).    If you get Pap tests with HPV test, you only need  "to test every 5 years, unless you have an abnormal result.     You do not need a Pap test if your uterus was removed (hysterectomy) and you have not had cancer.    You should be tested each year for STDs (sexually transmitted diseases) if you're at risk.     Have a mammogram every 1 to 2 years.    Have a colonoscopy at age 50, or have a yearly FIT test (stool test). These exams screen for colon cancer.      Have a cholesterol test every 5 years, or more often if advised.    Have a diabetes test (fasting glucose) every three years. If you are at risk for diabetes, you should have this test more often.     If you are at risk for osteoporosis (brittle bone disease), think about having a bone density scan (DEXA).    Shots: Get a flu shot each year. Get a tetanus shot every 10 years.    Nutrition:     Eat at least 5 servings of fruits and vegetables each day.    Eat whole-grain bread, whole-wheat pasta and brown rice instead of white grains and rice.    Talk to your provider about Calcium and Vitamin D.     Lifestyle    Exercise at least 150 minutes a week (30 minutes a day, 5 days a week). This will help you control your weight and prevent disease.    Limit alcohol to one drink per day.    No smoking.     Wear sunscreen to prevent skin cancer.     See your dentist every six months for an exam and cleaning.    See your eye doctor every 1 to 2 years.          COUNSELING:   Reviewed preventive health counseling, as reflected in patient instructions       Regular exercise       Healthy diet/nutrition       Vision screening       Hearing screening         reports that she has quit smoking. She has never used smokeless tobacco.    Estimated body mass index is 29.57 kg/(m^2) as calculated from the following:    Height as of this encounter: 5' 6.5\" (1.689 m).    Weight as of this encounter: 186 lb (84.4 kg).   Weight management plan: Discussed healthy diet and exercise guidelines and patient will follow up in 12 months in " clinic to re-evaluate.    Patient Instructions   Do not take ambien with pain medicine (vicodin/norco).    Recheck 6 months if needing more ambien or pain medications, otherwise one year    I will follow up with labs.         Preventive Health Recommendations  Female Ages 50 - 64    Yearly exam: See your health care provider every year in order to  o Review health changes.   o Discuss preventive care.    o Review your medicines if your doctor has prescribed any.      Get a Pap test every three years (unless you have an abnormal result and your provider advises testing more often).    If you get Pap tests with HPV test, you only need to test every 5 years, unless you have an abnormal result.     You do not need a Pap test if your uterus was removed (hysterectomy) and you have not had cancer.    You should be tested each year for STDs (sexually transmitted diseases) if you're at risk.     Have a mammogram every 1 to 2 years.    Have a colonoscopy at age 50, or have a yearly FIT test (stool test). These exams screen for colon cancer.      Have a cholesterol test every 5 years, or more often if advised.    Have a diabetes test (fasting glucose) every three years. If you are at risk for diabetes, you should have this test more often.     If you are at risk for osteoporosis (brittle bone disease), think about having a bone density scan (DEXA).    Shots: Get a flu shot each year. Get a tetanus shot every 10 years.    Nutrition:     Eat at least 5 servings of fruits and vegetables each day.    Eat whole-grain bread, whole-wheat pasta and brown rice instead of white grains and rice.    Talk to your provider about Calcium and Vitamin D.     Lifestyle    Exercise at least 150 minutes a week (30 minutes a day, 5 days a week). This will help you control your weight and prevent disease.    Limit alcohol to one drink per day.    No smoking.     Wear sunscreen to prevent skin cancer.     See your dentist every six months for an  exam and cleaning.    See your eye doctor every 1 to 2 years.        Counseling Resources:  ATP IV Guidelines  Pooled Cohorts Equation Calculator  Breast Cancer Risk Calculator  FRAX Risk Assessment  ICSI Preventive Guidelines  Dietary Guidelines for Americans, 2010  USDA's MyPlate  ASA Prophylaxis  Lung CA Screening    Stella Santiago PA-C  [facility]

## 2018-01-26 ENCOUNTER — OFFICE VISIT (OUTPATIENT)
Dept: FAMILY MEDICINE | Facility: CLINIC | Age: 53
End: 2018-01-26
Payer: COMMERCIAL

## 2018-01-26 VITALS
DIASTOLIC BLOOD PRESSURE: 88 MMHG | TEMPERATURE: 97.8 F | SYSTOLIC BLOOD PRESSURE: 138 MMHG | HEIGHT: 67 IN | BODY MASS INDEX: 29.19 KG/M2 | OXYGEN SATURATION: 99 % | WEIGHT: 186 LBS | HEART RATE: 73 BPM

## 2018-01-26 DIAGNOSIS — G44.219 EPISODIC TENSION-TYPE HEADACHE, NOT INTRACTABLE: ICD-10-CM

## 2018-01-26 DIAGNOSIS — I10 BENIGN ESSENTIAL HYPERTENSION: ICD-10-CM

## 2018-01-26 DIAGNOSIS — F51.01 PRIMARY INSOMNIA: ICD-10-CM

## 2018-01-26 DIAGNOSIS — Z00.01 ENCOUNTER FOR ROUTINE ADULT HEALTH EXAMINATION WITH ABNORMAL FINDINGS: Primary | ICD-10-CM

## 2018-01-26 DIAGNOSIS — J45.20 MILD INTERMITTENT ASTHMA WITHOUT COMPLICATION: ICD-10-CM

## 2018-01-26 DIAGNOSIS — N94.6 DYSMENORRHEA: ICD-10-CM

## 2018-01-26 LAB
ALBUMIN SERPL-MCNC: 3.9 G/DL (ref 3.4–5)
ALP SERPL-CCNC: 64 U/L (ref 40–150)
ALT SERPL W P-5'-P-CCNC: 20 U/L (ref 0–50)
ANION GAP SERPL CALCULATED.3IONS-SCNC: 8 MMOL/L (ref 3–14)
AST SERPL W P-5'-P-CCNC: 18 U/L (ref 0–45)
BILIRUB SERPL-MCNC: 0.4 MG/DL (ref 0.2–1.3)
BUN SERPL-MCNC: 9 MG/DL (ref 7–30)
CALCIUM SERPL-MCNC: 9.3 MG/DL (ref 8.5–10.1)
CHLORIDE SERPL-SCNC: 103 MMOL/L (ref 94–109)
CHOLEST SERPL-MCNC: 269 MG/DL
CO2 SERPL-SCNC: 28 MMOL/L (ref 20–32)
CREAT SERPL-MCNC: 0.76 MG/DL (ref 0.52–1.04)
GFR SERPL CREATININE-BSD FRML MDRD: 79 ML/MIN/1.7M2
GLUCOSE SERPL-MCNC: 92 MG/DL (ref 70–99)
HDLC SERPL-MCNC: 54 MG/DL
LDLC SERPL CALC-MCNC: 176 MG/DL
NONHDLC SERPL-MCNC: 215 MG/DL
POTASSIUM SERPL-SCNC: 3.9 MMOL/L (ref 3.4–5.3)
PROT SERPL-MCNC: 7.9 G/DL (ref 6.8–8.8)
SODIUM SERPL-SCNC: 139 MMOL/L (ref 133–144)
TRIGL SERPL-MCNC: 193 MG/DL

## 2018-01-26 PROCEDURE — 99396 PREV VISIT EST AGE 40-64: CPT | Mod: 25 | Performed by: PHYSICIAN ASSISTANT

## 2018-01-26 PROCEDURE — 80053 COMPREHEN METABOLIC PANEL: CPT | Performed by: PHYSICIAN ASSISTANT

## 2018-01-26 PROCEDURE — 80061 LIPID PANEL: CPT | Performed by: PHYSICIAN ASSISTANT

## 2018-01-26 PROCEDURE — 99214 OFFICE O/P EST MOD 30 MIN: CPT | Mod: 25 | Performed by: PHYSICIAN ASSISTANT

## 2018-01-26 PROCEDURE — 36415 COLL VENOUS BLD VENIPUNCTURE: CPT | Performed by: PHYSICIAN ASSISTANT

## 2018-01-26 RX ORDER — LISINOPRIL 40 MG/1
40 TABLET ORAL DAILY
Qty: 90 TABLET | Refills: 1 | Status: SHIPPED | OUTPATIENT
Start: 2018-01-26 | End: 2018-08-10

## 2018-01-26 RX ORDER — HYDROCODONE BITARTRATE AND ACETAMINOPHEN 5; 325 MG/1; MG/1
1 TABLET ORAL EVERY 8 HOURS PRN
Qty: 20 TABLET | Refills: 0 | Status: SHIPPED | OUTPATIENT
Start: 2018-01-26 | End: 2018-08-10

## 2018-01-26 RX ORDER — AMLODIPINE BESYLATE 10 MG/1
10 TABLET ORAL DAILY
Qty: 90 TABLET | Refills: 1 | Status: SHIPPED | OUTPATIENT
Start: 2018-01-26 | End: 2018-08-10

## 2018-01-26 RX ORDER — ZOLPIDEM TARTRATE 10 MG/1
10 TABLET ORAL
Qty: 30 TABLET | Refills: 5 | Status: SHIPPED | OUTPATIENT
Start: 2018-01-26 | End: 2018-08-10

## 2018-01-26 NOTE — MR AVS SNAPSHOT
After Visit Summary   1/26/2018    Benita Benz    MRN: [id number]           Patient Information     Date Of Birth          1965        Visit Information        Provider Department      1/26/2018 9:40 AM Stella Santiago PA-C [facility]        Today's Diagnoses     Encounter for routine adult health examination with abnormal findings    -  1    Primary insomnia        Benign essential hypertension        Mild intermittent asthma without complication        Dysmenorrhea        Episodic tension-type headache, not intractable          Care Instructions    Do not take ambien with pain medicine (vicodin/norco).    Recheck 6 months if needing more ambien or pain medications, otherwise one year    I will follow up with labs.         Preventive Health Recommendations  Female Ages 50 - 64    Yearly exam: See your health care provider every year in order to  o Review health changes.   o Discuss preventive care.    o Review your medicines if your doctor has prescribed any.      Get a Pap test every three years (unless you have an abnormal result and your provider advises testing more often).    If you get Pap tests with HPV test, you only need to test every 5 years, unless you have an abnormal result.     You do not need a Pap test if your uterus was removed (hysterectomy) and you have not had cancer.    You should be tested each year for STDs (sexually transmitted diseases) if you're at risk.     Have a mammogram every 1 to 2 years.    Have a colonoscopy at age 50, or have a yearly FIT test (stool test). These exams screen for colon cancer.      Have a cholesterol test every 5 years, or more often if advised.    Have a diabetes test (fasting glucose) every three years. If you are at risk for diabetes, you should have this test more often.     If you are at risk for osteoporosis (brittle bone disease), think about having a bone density scan (DEXA).    Shots: Get a flu shot each  "year. Get a tetanus shot every 10 years.    Nutrition:     Eat at least 5 servings of fruits and vegetables each day.    Eat whole-grain bread, whole-wheat pasta and brown rice instead of white grains and rice.    Talk to your provider about Calcium and Vitamin D.     Lifestyle    Exercise at least 150 minutes a week (30 minutes a day, 5 days a week). This will help you control your weight and prevent disease.    Limit alcohol to one drink per day.    No smoking.     Wear sunscreen to prevent skin cancer.     See your dentist every six months for an exam and cleaning.    See your eye doctor every 1 to 2 years.            Follow-ups after your visit        Who to contact     If you have questions or need follow up information about today's clinic visit or your schedule please contact [facility] directly at [phone].  Normal or non-critical lab and imaging results will be communicated to you by MyChart, letter or phone within 4 business days after the clinic has received the results. If you do not hear from us within 7 days, please contact the clinic through MyChart or phone. If you have a critical or abnormal lab result, we will notify you by phone as soon as possible.  Submit refill requests through [employer] or call your pharmacy and they will forward the refill request to us. Please allow 3 business days for your refill to be completed.          Additional Information About Your Visit        Care EveryWhere ID     This is your Care EveryWhere ID. This could be used by other organizations to access your [place] medical records  [id number]        Your Vitals Were     Pulse Temperature Height Last Period Pulse Oximetry Breastfeeding?    73 97.8  F (36.6  C) (Oral) 5' 6.5\" (1.689 m) 01/24/2018 99% No    BMI (Body Mass Index)                   29.57 kg/m2            Blood Pressure from Last 3 Encounters:   01/26/18 138/88   01/10/17 119/71   06/17/16 131/78    Weight from Last 3 Encounters: "   01/26/18 186 lb (84.4 kg)   01/10/17 186 lb (84.4 kg)   06/17/16 188 lb 4.8 oz (85.4 kg)              We Performed the Following     Comprehensive metabolic panel     Lipid panel reflex to direct LDL Fasting          Today's Medication Changes          These changes are accurate as of 1/26/18 10:43 AM.  If you have any questions, ask your nurse or doctor.               These medicines have changed or have updated prescriptions.        Dose/Directions    HYDROcodone-acetaminophen 5-325 MG per tablet   Commonly known as:  NORCO   This may have changed:    - how much to take  - how to take this  - when to take this  - reasons to take this   Used for:  Encounter for routine adult health examination with abnormal findings   Changed by:  Stella Santiago PA-C        Dose:  1 tablet   Take 1 tablet by mouth every 8 hours as needed for moderate to severe pain   Quantity:  20 tablet   Refills:  0            Where to get your medicines      These medications were sent to [facility] [address] [place] [address] [facility]  [address] [facility] [address]     Phone:  [phone]     amLODIPine 10 MG tablet    lisinopril 40 MG tablet         Some of these will need a paper prescription and others can be bought over the counter.  Ask your nurse if you have questions.     Bring a paper prescription for each of these medications     HYDROcodone-acetaminophen 5-325 MG per tablet    zolpidem 10 MG tablet                Primary Care Provider Office Phone # Fax #    [facility] [phone] [address] [address] [facility] [address]        Equal Access to Services     [facility]: Hadii aad ku hadasho Soomaali, waaxda luqadaha, qaybta kaalmada adeegyada, lj spicer. So [facility] [phone].    ATENCIÓN: Si habla español, tiene a reyes disposición servicios gratuitos de asistencia lingüística. Llame al [phone].    We comply with applicable  federal civil rights laws and [place] laws. We do not discriminate on the basis of race, color, national origin, age, disability, sex, sexual orientation, or gender identity.            Thank you!     Thank you for choosing [facility]  [facility] your [facility]. Our goal is always to provide you with excellent care. Hearing back from our patients is one way we can continue to improve our services. Please take a few minutes to complete the written survey that you may receive in the mail after your visit with us. Thank you!             Your Updated Medication List - Protect others around you: Learn how to safely use, store and throw away your medicines at www.disposemymeds.org.          This list is accurate as of 1/26/18 10:43 AM.  Always use your most recent med list.                   Brand Name Dispense Instructions for use Diagnosis    albuterol 108 (90 BASE) MCG/ACT Inhaler    PROAIR HFA/PROVENTIL HFA/VENTOLIN HFA    1 Inhaler    Inhale 2 puffs into the lungs every 6 hours as needed for shortness of breath / dyspnea or wheezing    Mild intermittent asthma without complication       amLODIPine 10 MG tablet    NORVASC    90 tablet    Take 1 tablet (10 mg) by mouth daily    Benign essential hypertension       flax seed oil 1000 MG capsule      Take 1 capsule by mouth daily        hydrochlorothiazide 25 MG tablet    HYDRODIURIL    90 tablet    Take 1 tablet (25 mg) by mouth daily    Benign essential hypertension       HYDROcodone-acetaminophen 5-325 MG per tablet    NORCO    20 tablet    Take 1 tablet by mouth every 8 hours as needed for moderate to severe pain    Encounter for routine adult health examination with abnormal findings       lisinopril 40 MG tablet    PRINIVIL/ZESTRIL    90 tablet    Take 1 tablet (40 mg) by mouth daily    Benign essential hypertension       methocarbamol 500 MG tablet    ROBAXIN    60 tablet    Take 2 tablets (1,000 mg) by mouth 3 times daily as needed for muscle spasms     Episodic tension-type headache, not intractable       MULTIVITAMIN ADULT PO           OMEGA-3 FISH OIL PO           omeprazole 20 MG CR capsule    priLOSEC    90 capsule    TAKE ONE CAPSULE BY MOUTH ONCE DAILY    Gastroesophageal reflux disease without esophagitis       zolpidem 10 MG tablet    AMBIEN    30 tablet    Take 1 tablet (10 mg) by mouth nightly as needed for sleep    Primary insomnia

## 2018-01-26 NOTE — NURSING NOTE
"Chief Complaint   Patient presents with     Physical     AFE, Pt will be fasting     Forms     FLMA forms       Initial /88  Pulse 73  Temp 97.8  F (36.6  C) (Oral)  Ht 5' 6.5\" (1.689 m)  Wt 186 lb (84.4 kg)  LMP 01/24/2018  SpO2 99%  Breastfeeding? No  BMI 29.57 kg/m2 Estimated body mass index is 29.57 kg/(m^2) as calculated from the following:    Height as of this encounter: 5' 6.5\" (1.689 m).    Weight as of this encounter: 186 lb (84.4 kg).  Medication Reconciliation: complete      Aquiles Kwan MA    "

## 2018-01-26 NOTE — LETTER
My Asthma Action Plan  Name: Benita Benz   YOB: 1965  Date: 1/26/2018   My doctor: Stella Santiago PA-C   My clinic: [facility]        My Control Medicine: None  My Rescue Medicine: Albuterol (Proair/Ventolin/Proventil) inhaler s   My Asthma Severity: intermittent  Avoid your asthma triggers: Patient is unaware of triggers               GREEN ZONE   Good Control    I feel good    No cough or wheeze    Can work, sleep and play without asthma symptoms       Take your asthma control medicine every day.     1. If exercise triggers your asthma, take your rescue medication    15 minutes before exercise or sports, and    During exercise if you have asthma symptoms  2. Spacer to use with inhaler: If you have a spacer, make sure to use it with your inhaler             YELLOW ZONE Getting Worse  I have ANY of these:    I do not feel good    Cough or wheeze    Chest feels tight    Wake up at night   1. Keep taking your Green Zone medications  2. Start taking your rescue medicine:    every 20 minutes for up to 1 hour. Then every 4 hours for 24-48 hours.  3. If you stay in the Yellow Zone for more than 12-24 hours, contact your doctor.  4. If you do not return to the Green Zone in 12-24 hours or you get worse, start taking your oral steroid medicine if prescribed by your provider.           RED ZONE Medical Alert - Get Help  I have ANY of these:    I feel awful    Medicine is not helping    Breathing getting harder    Trouble walking or talking    Nose opens wide to breathe       1. Take your rescue medicine NOW  2. If your provider has prescribed an oral steroid medicine, start taking it NOW  3. Call your doctor NOW  4. If you are still in the Red Zone after 20 minutes and you have not reached your doctor:    Take your rescue medicine again and    Call 911 or go to the emergency room right away    See your regular doctor within 2 weeks of an Emergency Room or Urgent Care visit for  follow-up treatment.        Electronically signed by: Stella Santiago, January 26, 2018    Annual Reminders:  Meet with Asthma Educator,  Flu Shot in the Fall, consider Pneumonia Vaccination for patients with asthma (aged 19 and older).    Pharmacy: [facility] [address] [place], [place] [address], [place] - [address] [address]                    Asthma Triggers  How To Control Things That Make Your Asthma Worse    Triggers are things that make your asthma worse.  Look at the list below to help you find your triggers and what you can do about them.  You can help prevent asthma flare-ups by staying away from your triggers.      Trigger                                                          What you can do   Cigarette Smoke  Tobacco smoke can make asthma worse. Do not allow smoking in your home, car or around you.  Be sure no one smokes at a child s day care or school.  If you smoke, ask your health care provider for ways to help you quit.  Ask family members to quit too.  Ask your health care provider for a referral to Quit Plan to help you quit smoking, or call 2-662-525-PLAN.     Colds, Flu, Bronchitis  These are common triggers of asthma. Wash your hands often.  Don t touch your eyes, nose or mouth.  Get a flu shot every year.     Dust Mites  These are tiny bugs that live in cloth or carpet. They are too small to see. Wash sheets and blankets in hot water every week.   Encase pillows and mattress in dust mite proof covers.  Avoid having carpet if you can. If you have carpet, vacuum weekly.   Use a dust mask and HEPA vacuum.   Pollen and Outdoor Mold  Some people are allergic to trees, grass, or weed pollen, or molds. Try to keep your windows closed.  Limit time out doors when pollen count is high.   Ask you health care provider about taking medicine during allergy season.     Animal Dander  Some people are allergic to skin flakes, urine or saliva from pets with fur or feathers. Keep pets with fur or feathers out of your  home.    If you can t keep the pet outdoors, then keep the pet out of your bedroom.  Keep the bedroom door closed.  Keep pets off cloth furniture and away from stuffed toys.     Mice, Rats, and Cockroaches  Some people are allergic to the waste from these pests.   Cover food and garbage.  Clean up spills and food crumbs.  Store grease in the refrigerator.   Keep food out of the bedroom.   Indoor Mold  This can be a trigger if your home has high moisture. Fix leaking faucets, pipes, or other sources of water.   Clean moldy surfaces.  Dehumidify basement if it is damp and smelly.   Smoke, Strong Odors, and Sprays  These can reduce air quality. Stay away from strong odors and sprays, such as perfume, powder, hair spray, paints, smoke incense, paint, cleaning products, candles and new carpet.   Exercise or Sports  Some people with asthma have this trigger. Be active!  Ask your doctor about taking medicine before sports or exercise to prevent symptoms.    Warm up for 5-10 minutes before and after sports or exercise.     Other Triggers of Asthma  Cold air:  Cover your nose and mouth with a scarf.  Sometimes laughing or crying can be a trigger.  Some medicines and food can trigger asthma.

## 2018-01-26 NOTE — LETTER
January 29, 2018    Benita Benz  [address] [facility] [address]        Dear Benita,    It was a pleasure to see you at your recent office visit.  Your test results are listed below.    Sodium, potassium, kidney and liver function normal.  Blood sugar normal, no diabetes.  Cholesterol has overall worsened. I would strongly encourage you to work on diet and exercise and you can get a referral for a health  if you would like for this. Lets recheck in 3 months, if no improvement we will need to discuss in office visit.           If you have any questions or concerns, please call the clinic at [phone].    Sincerely,  Stella Santiago PA-C    Results for orders placed or performed in visit on 01/26/18   Lipid panel reflex to direct LDL Fasting   Result Value Ref Range    Cholesterol 269 (H) <200 mg/dL    Triglycerides 193 (H) <150 mg/dL    HDL Cholesterol 54 >49 mg/dL    LDL Cholesterol Calculated 176 (H) <100 mg/dL    Non HDL Cholesterol 215 (H) <130 mg/dL   Comprehensive metabolic panel   Result Value Ref Range    Sodium 139 133 - 144 mmol/L    Potassium 3.9 3.4 - 5.3 mmol/L    Chloride 103 94 - 109 mmol/L    Carbon Dioxide 28 20 - 32 mmol/L    Anion Gap 8 3 - 14 mmol/L    Glucose 92 70 - 99 mg/dL    Urea Nitrogen 9 7 - 30 mg/dL    Creatinine 0.76 0.52 - 1.04 mg/dL    GFR Estimate 79 >60 mL/min/1.7m2    GFR Estimate If Black >90 >60 mL/min/1.7m2    Calcium 9.3 8.5 - 10.1 mg/dL    Bilirubin Total 0.4 0.2 - 1.3 mg/dL    Albumin 3.9 3.4 - 5.0 g/dL    Protein Total 7.9 6.8 - 8.8 g/dL    Alkaline Phosphatase 64 40 - 150 U/L    ALT 20 0 - 50 U/L    AST 18 0 - 45 U/L

## 2018-01-27 ASSESSMENT — ASTHMA QUESTIONNAIRES: ACT_TOTALSCORE: 25

## 2018-01-28 NOTE — PROGRESS NOTES
Dear Benita,      It was a pleasure to see you at your recent office visit.  Your test results are listed below.    Sodium, potassium, kidney and liver function normal.  Blood sugar normal, no diabetes.  Cholesterol has overall worsened. I would strongly encourage you to work on diet and exercise and you can get a referral for a health  if you would like for this. Lets recheck in 3 months, if no improvement we will need to discuss in office visit.           If you have any questions or concerns, please call the clinic at [phone].    Sincerely,  Stella Santiago PA-C

## 2018-03-19 DIAGNOSIS — I10 BENIGN ESSENTIAL HYPERTENSION: ICD-10-CM

## 2018-03-19 RX ORDER — LISINOPRIL 40 MG/1
TABLET ORAL
Qty: 30 TABLET | Refills: 11 | OUTPATIENT
Start: 2018-03-19

## 2018-04-24 ENCOUNTER — OFFICE VISIT (OUTPATIENT)
Dept: FAMILY MEDICINE | Facility: CLINIC | Age: 53
End: 2018-04-24
Payer: COMMERCIAL

## 2018-04-24 VITALS
WEIGHT: 190 LBS | TEMPERATURE: 98.5 F | DIASTOLIC BLOOD PRESSURE: 83 MMHG | HEART RATE: 74 BPM | SYSTOLIC BLOOD PRESSURE: 129 MMHG | BODY MASS INDEX: 30.21 KG/M2 | RESPIRATION RATE: 16 BRPM | OXYGEN SATURATION: 98 %

## 2018-04-24 DIAGNOSIS — Z13.220 LIPID SCREENING: ICD-10-CM

## 2018-04-24 DIAGNOSIS — R42 VERTIGO: Primary | ICD-10-CM

## 2018-04-24 DIAGNOSIS — R11.0 NAUSEA: ICD-10-CM

## 2018-04-24 LAB
ALBUMIN SERPL-MCNC: 3.8 G/DL (ref 3.4–5)
ALP SERPL-CCNC: 61 U/L (ref 40–150)
ALT SERPL W P-5'-P-CCNC: 17 U/L (ref 0–50)
ANION GAP SERPL CALCULATED.3IONS-SCNC: 7 MMOL/L (ref 3–14)
AST SERPL W P-5'-P-CCNC: 20 U/L (ref 0–45)
BASOPHILS # BLD AUTO: 0.1 10E9/L (ref 0–0.2)
BASOPHILS NFR BLD AUTO: 0.8 %
BILIRUB SERPL-MCNC: 0.4 MG/DL (ref 0.2–1.3)
BUN SERPL-MCNC: 8 MG/DL (ref 7–30)
CALCIUM SERPL-MCNC: 8.8 MG/DL (ref 8.5–10.1)
CHLORIDE SERPL-SCNC: 105 MMOL/L (ref 94–109)
CHOLEST SERPL-MCNC: 248 MG/DL
CO2 SERPL-SCNC: 28 MMOL/L (ref 20–32)
CREAT SERPL-MCNC: 0.81 MG/DL (ref 0.52–1.04)
DIFFERENTIAL METHOD BLD: NORMAL
EOSINOPHIL # BLD AUTO: 0.2 10E9/L (ref 0–0.7)
EOSINOPHIL NFR BLD AUTO: 3.3 %
ERYTHROCYTE [DISTWIDTH] IN BLOOD BY AUTOMATED COUNT: 13.9 % (ref 10–15)
FERRITIN SERPL-MCNC: 15 NG/ML (ref 8–252)
GFR SERPL CREATININE-BSD FRML MDRD: 74 ML/MIN/1.7M2
GLUCOSE SERPL-MCNC: 88 MG/DL (ref 70–99)
HBA1C MFR BLD: 5.4 % (ref 0–5.6)
HCT VFR BLD AUTO: 36.6 % (ref 35–47)
HDLC SERPL-MCNC: 58 MG/DL
HGB BLD-MCNC: 12.1 G/DL (ref 11.7–15.7)
LDLC SERPL CALC-MCNC: 157 MG/DL
LYMPHOCYTES # BLD AUTO: 2.9 10E9/L (ref 0.8–5.3)
LYMPHOCYTES NFR BLD AUTO: 44.9 %
MCH RBC QN AUTO: 29.4 PG (ref 26.5–33)
MCHC RBC AUTO-ENTMCNC: 33.1 G/DL (ref 31.5–36.5)
MCV RBC AUTO: 89 FL (ref 78–100)
MONOCYTES # BLD AUTO: 0.5 10E9/L (ref 0–1.3)
MONOCYTES NFR BLD AUTO: 8.2 %
NEUTROPHILS # BLD AUTO: 2.7 10E9/L (ref 1.6–8.3)
NEUTROPHILS NFR BLD AUTO: 42.8 %
NONHDLC SERPL-MCNC: 190 MG/DL
PLATELET # BLD AUTO: 401 10E9/L (ref 150–450)
POTASSIUM SERPL-SCNC: 4.3 MMOL/L (ref 3.4–5.3)
PROT SERPL-MCNC: 7.7 G/DL (ref 6.8–8.8)
RBC # BLD AUTO: 4.12 10E12/L (ref 3.8–5.2)
SODIUM SERPL-SCNC: 140 MMOL/L (ref 133–144)
TRIGL SERPL-MCNC: 164 MG/DL
TSH SERPL DL<=0.005 MIU/L-ACNC: 1.51 MU/L (ref 0.4–4)
WBC # BLD AUTO: 6.4 10E9/L (ref 4–11)

## 2018-04-24 PROCEDURE — 80061 LIPID PANEL: CPT | Performed by: PHYSICIAN ASSISTANT

## 2018-04-24 PROCEDURE — 83036 HEMOGLOBIN GLYCOSYLATED A1C: CPT | Performed by: PHYSICIAN ASSISTANT

## 2018-04-24 PROCEDURE — 85025 COMPLETE CBC W/AUTO DIFF WBC: CPT | Performed by: PHYSICIAN ASSISTANT

## 2018-04-24 PROCEDURE — 84443 ASSAY THYROID STIM HORMONE: CPT | Performed by: PHYSICIAN ASSISTANT

## 2018-04-24 PROCEDURE — 82728 ASSAY OF FERRITIN: CPT | Performed by: PHYSICIAN ASSISTANT

## 2018-04-24 PROCEDURE — 80053 COMPREHEN METABOLIC PANEL: CPT | Performed by: PHYSICIAN ASSISTANT

## 2018-04-24 PROCEDURE — 36415 COLL VENOUS BLD VENIPUNCTURE: CPT | Performed by: PHYSICIAN ASSISTANT

## 2018-04-24 PROCEDURE — 99214 OFFICE O/P EST MOD 30 MIN: CPT | Performed by: PHYSICIAN ASSISTANT

## 2018-04-24 RX ORDER — MECLIZINE HYDROCHLORIDE 25 MG/1
25 TABLET ORAL EVERY 6 HOURS PRN
Qty: 30 TABLET | Refills: 1 | Status: SHIPPED | OUTPATIENT
Start: 2018-04-24 | End: 2020-03-11

## 2018-04-24 NOTE — MR AVS SNAPSHOT
After Visit Summary   4/24/2018    Benita Benz    MRN: [id number]           Patient Information     Date Of Birth          1965        Visit Information        Provider Department      4/24/2018 12:00 PM Stella Santiago PA-C [facility]        Today's Diagnoses     Vertigo    -  1    Nausea        Lipid screening           Follow-ups after your visit        Additional Services     NEUROLOGY ADULT REFERRAL       Your provider has referred you for the following:   Consult at [facility] [phone]   [email or web address]    Please be aware that coverage of these services is subject to the terms and limitations of your health insurance plan.  Call member services at your health plan with any benefit or coverage questions.      Please bring the following with you to your appointment:    (1) Any X-Rays, CTs or MRIs which have been performed.  Contact the facility where they were done to arrange for  prior to your scheduled appointment.    (2) List of current medications  (3) This referral request   (4) Any documents/labs given to you for this referral                  Who to contact     If you have questions or need follow up information about today's clinic visit or your schedule please contact [facility] directly at [phone].  Normal or non-critical lab and imaging results will be communicated to you by MyChart, letter or phone within 4 business days after the clinic has received the results. If you do not hear from us within 7 days, please contact the clinic through MyChart or phone. If you have a critical or abnormal lab result, we will notify you by phone as soon as possible.  Submit refill requests through [employer] or call your pharmacy and they will forward the refill request to us. Please allow 3 business days for your refill to be completed.          Additional Information About Your  Visit        Care EveryWhere ID     This is your Care EveryWhere ID. This could be used by other organizations to access your [place] medical records  [id number]        Your Vitals Were     Pulse Temperature Respirations Pulse Oximetry Breastfeeding? BMI (Body Mass Index)    74 98.5  F (36.9  C) (Oral) 16 98% No 30.21 kg/m2       Blood Pressure from Last 3 Encounters:   04/24/18 129/83   01/26/18 138/88   01/10/17 119/71    Weight from Last 3 Encounters:   04/24/18 190 lb (86.2 kg)   01/26/18 186 lb (84.4 kg)   01/10/17 186 lb (84.4 kg)              We Performed the Following     CBC with platelets differential     Comprehensive metabolic panel     Ferritin     Hemoglobin A1c     Lipid panel reflex to direct LDL Fasting     NEUROLOGY ADULT REFERRAL     TSH with free T4 reflex          Today's Medication Changes          These changes are accurate as of 4/24/18 12:36 PM.  If you have any questions, ask your nurse or doctor.               Start taking these medicines.        Dose/Directions    meclizine 25 MG tablet   Commonly known as:  ANTIVERT   Used for:  Vertigo, Nausea   Started by:  Stella Santiago PA-C        Dose:  25 mg   Take 1 tablet (25 mg) by mouth every 6 hours as needed for dizziness   Quantity:  30 tablet   Refills:  1            Where to get your medicines      These medications were sent to [facility] [address] [place] [address] [facility]  [address] [facility] [address]     Phone:  [phone]     meclizine 25 MG tablet                Primary Care Provider Office Phone # Fax #    [facility] [phone] [address] [facility] [address]        Equal Access to Services     WALLY ROTHMAN : Bossman Bolivar, ida watts, qalj workman. So [facility] [phone].    ATENCIÓN: Si habla español, tiene a reyes disposición servicios gratuitos de asistencia  lingüística. Jesús al [phone].    We comply with applicable federal civil rights laws and [place] laws. We do not discriminate on the basis of race, color, national origin, age, disability, sex, sexual orientation, or gender identity.            Thank you!     Thank you for choosing [facility]  [facility] your care. Our goal is always to provide you with excellent care. Hearing back from our patients is one way we can continue to improve our services. Please take a few minutes to complete the written survey that you may receive in the mail after your visit with us. Thank you!             Your Updated Medication List - Protect others around you: Learn how to safely use, store and throw away your medicines at www.disposemymeds.org.          This list is accurate as of 4/24/18 12:36 PM.  Always use your most recent med list.                   Brand Name Dispense Instructions for use Diagnosis    albuterol 108 (90 Base) MCG/ACT Inhaler    PROAIR HFA/PROVENTIL HFA/VENTOLIN HFA    1 Inhaler    Inhale 2 puffs into the lungs every 6 hours as needed for shortness of breath / dyspnea or wheezing    Mild intermittent asthma without complication       amLODIPine 10 MG tablet    NORVASC    90 tablet    Take 1 tablet (10 mg) by mouth daily    Benign essential hypertension       flax seed oil 1000 MG capsule      Take 1 capsule by mouth daily        hydrochlorothiazide 25 MG tablet    HYDRODIURIL    90 tablet    Take 1 tablet (25 mg) by mouth daily    Benign essential hypertension       HYDROcodone-acetaminophen 5-325 MG per tablet    NORCO    20 tablet    Take 1 tablet by mouth every 8 hours as needed for moderate to severe pain    Encounter for routine adult health examination with abnormal findings       lisinopril 40 MG tablet    PRINIVIL/ZESTRIL    90 tablet    Take 1 tablet (40 mg) by mouth daily    Benign essential hypertension       meclizine 25 MG tablet    ANTIVERT    30 tablet    Take 1 tablet (25 mg) by  mouth every 6 hours as needed for dizziness    Vertigo, Nausea       methocarbamol 500 MG tablet    ROBAXIN    60 tablet    Take 2 tablets (1,000 mg) by mouth 3 times daily as needed for muscle spasms    Episodic tension-type headache, not intractable       MULTIVITAMIN ADULT PO           OMEGA-3 FISH OIL PO           omeprazole 20 MG CR capsule    priLOSEC    90 capsule    TAKE ONE CAPSULE BY MOUTH ONCE DAILY    Gastroesophageal reflux disease without esophagitis       zolpidem 10 MG tablet    AMBIEN    30 tablet    Take 1 tablet (10 mg) by mouth nightly as needed for sleep    Primary insomnia

## 2018-04-24 NOTE — NURSING NOTE
"Chief Complaint   Patient presents with     Dizziness     vertigo per pt x 1 week now but on and off for a month, Pt is fasting needing labs done     Health Maintenance     UTD       Initial /83  Pulse 74  Temp 98.5  F (36.9  C) (Oral)  Resp 16  Wt 190 lb (86.2 kg)  SpO2 98%  Breastfeeding? No  BMI 30.21 kg/m2 Estimated body mass index is 30.21 kg/(m^2) as calculated from the following:    Height as of 1/26/18: 5' 6.5\" (1.689 m).    Weight as of this encounter: 190 lb (86.2 kg).  Medication Reconciliation: complete      Aquiles Kwan MA    "

## 2018-04-24 NOTE — LETTER
[facility]  [address] [facility] [address]  Phone: [phone]    April 24, 2018        Benita Benz  [address] [address] [facility] [address]          To whom it may concern:    RE: Benita Benz  Patient was seen and treated today at our clinic and missed work for acute illness.  They may be excused today  and return when symptoms improve.       Please contact me for questions or concerns.      Sincerely,        Stella Santiago PA-C

## 2018-04-24 NOTE — LETTER
April 25, 2018    Benita Benz  [id number] [facility] [id number]        Dear Benita,    It was a pleasure to see you at your recent office visit.  Your test results are listed below.  Cholesterol has improved some. Thyroid normal. Ferritin or iron storage normal. White and red blood cell counts normal.  No anemia.  Sodium, potassium, kidney and liver function normal.  Blood sugar normal, no diabetes.            If you have any questions or concerns, please call the clinic at [phone].    Sincerely,  Stella Santiago PA-C    Results for orders placed or performed in visit on 04/24/18   Lipid panel reflex to direct LDL Fasting   Result Value Ref Range    Cholesterol 248 (H) <200 mg/dL    Triglycerides 164 (H) <150 mg/dL    HDL Cholesterol 58 >49 mg/dL    LDL Cholesterol Calculated 157 (H) <100 mg/dL    Non HDL Cholesterol 190 (H) <130 mg/dL   TSH with free T4 reflex   Result Value Ref Range    TSH 1.51 0.40 - 4.00 mU/L   Comprehensive metabolic panel   Result Value Ref Range    Sodium 140 133 - 144 mmol/L    Potassium 4.3 3.4 - 5.3 mmol/L    Chloride 105 94 - 109 mmol/L    Carbon Dioxide 28 20 - 32 mmol/L    Anion Gap 7 3 - 14 mmol/L    Glucose 88 70 - 99 mg/dL    Urea Nitrogen 8 7 - 30 mg/dL    Creatinine 0.81 0.52 - 1.04 mg/dL    GFR Estimate 74 >60 mL/min/1.7m2    GFR Estimate If Black 89 >60 mL/min/1.7m2    Calcium 8.8 8.5 - 10.1 mg/dL    Bilirubin Total 0.4 0.2 - 1.3 mg/dL    Albumin 3.8 3.4 - 5.0 g/dL    Protein Total 7.7 6.8 - 8.8 g/dL    Alkaline Phosphatase 61 40 - 150 U/L    ALT 17 0 - 50 U/L    AST 20 0 - 45 U/L   Ferritin   Result Value Ref Range    Ferritin 15 8 - 252 ng/mL   CBC with platelets differential   Result Value Ref Range    WBC 6.4 4.0 - 11.0 10e9/L    RBC Count 4.12 3.8 - 5.2 10e12/L    Hemoglobin 12.1 11.7 - 15.7 g/dL    Hematocrit 36.6 35.0 - 47.0 %    MCV 89 78 - 100 fl    MCH 29.4 26.5 - 33.0 pg    MCHC 33.1 31.5 - 36.5 g/dL    RDW 13.9 10.0 - 15.0 %    Platelet Count 401  150 - 450 10e9/L    Diff Method Automated Method     % Neutrophils 42.8 %    % Lymphocytes 44.9 %    % Monocytes 8.2 %    % Eosinophils 3.3 %    % Basophils 0.8 %    Absolute Neutrophil 2.7 1.6 - 8.3 10e9/L    Absolute Lymphocytes 2.9 0.8 - 5.3 10e9/L    Absolute Monocytes 0.5 0.0 - 1.3 10e9/L    Absolute Eosinophils 0.2 0.0 - 0.7 10e9/L    Absolute Basophils 0.1 0.0 - 0.2 10e9/L   Hemoglobin A1c   Result Value Ref Range    Hemoglobin A1C 5.4 0 - 5.6 %

## 2018-04-24 NOTE — PROGRESS NOTES
SUBJECTIVE:                                                    Benita Benz is a 53 year old female who presents to clinic today for the following health issues:    Dizziness  Onset: x 1 week    Description:   Do you feel faint:  YES  Does it feel like the surroundings (bed, room) are moving: YES  Unsteady/off balance: YES  Have you passed out or fallen: no     Intensity: moderate    Progression of Symptoms:  worsening    Accompanying Signs & Symptoms:  Heart palpitations: no   Nausea, vomiting: YES- nausea  Weakness in arms or legs: no   Fatigue: YES- little  Vision or speech changes: no   Ringing in ears (Tinnitus): YES- L ear hearing is weird per pt  Hearing Loss: no     History:   Head trauma/concussion hx: no   Previous similar symptoms: YES- 1 month ago  Recent bleeding history: no     Precipitating factors:   Worse with activity or head movement: YES  Any new medications (BP?): no   Alcohol/drug abuse/withdrawal: no     Alleviating factors:   Does staying in a fixed position give relief:  YES- little    Therapies Tried and outcome: OTC medication motion ease oil (lavender) did not help      2 months ago had episode where she felt very dizzy.  Was not seen for this.  Now this has been happening again and lasting longer.  Has nausea this time. Wears glasses. Worse the past two days.  If she moves too fast she feels dizzy.   Is fasting today.  No headache typically with this but some today.  Vision feels normal to her.   No cold symptoms with this.  Does feel she hears some whooshing sounds in her left ear, maybe less hearing she is not sure.  Had wax buildup removed today from right ear.   No fullness in her ear.    No vomiting or diarrhea.   No weakness.   Feels more tired with this.   Drink lots of water throughout the day.         Problem list and histories reviewed & adjusted, as indicated.  Additional history: as documented    Patient Active Problem List   Diagnosis     Benign essential hypertension      Mild intermittent asthma without complication     Primary insomnia     Dysmenorrhea     Episodic tension-type headache, not intractable     Past Surgical History:   Procedure Laterality Date     EYE SURGERY  2014    toe surgery       Social History   Substance Use Topics     Smoking status: Former Smoker     Smokeless tobacco: Never Used     Alcohol use Yes      Comment: rare     Family History   Problem Relation Age of Onset     DIABETES Mother      Thyroid Disease Mother      Sarcoidosis Mother          Current Outpatient Prescriptions   Medication Sig Dispense Refill     albuterol (PROAIR HFA, PROVENTIL HFA, VENTOLIN HFA) 108 (90 BASE) MCG/ACT inhaler Inhale 2 puffs into the lungs every 6 hours as needed for shortness of breath / dyspnea or wheezing 1 Inhaler 3     amLODIPine (NORVASC) 10 MG tablet Take 1 tablet (10 mg) by mouth daily 90 tablet 1     Flaxseed, Linseed, (FLAX SEED OIL) 1000 MG capsule Take 1 capsule by mouth daily       hydrochlorothiazide (HYDRODIURIL) 25 MG tablet Take 1 tablet (25 mg) by mouth daily 90 tablet 1     HYDROcodone-acetaminophen (NORCO) 5-325 MG per tablet Take 1 tablet by mouth every 8 hours as needed for moderate to severe pain 20 tablet 0     lisinopril (PRINIVIL/ZESTRIL) 40 MG tablet Take 1 tablet (40 mg) by mouth daily 90 tablet 1     meclizine (ANTIVERT) 25 MG tablet Take 1 tablet (25 mg) by mouth every 6 hours as needed for dizziness 30 tablet 1     methocarbamol (ROBAXIN) 500 MG tablet Take 2 tablets (1,000 mg) by mouth 3 times daily as needed for muscle spasms (Patient not taking: Reported on 4/24/2018) 60 tablet 1     Multiple Vitamins-Minerals (MULTIVITAMIN ADULT PO)        Omega-3 Fatty Acids (OMEGA-3 FISH OIL PO)        omeprazole (PRILOSEC) 20 MG CR capsule TAKE ONE CAPSULE BY MOUTH ONCE DAILY 90 capsule 1     zolpidem (AMBIEN) 10 MG tablet Take 1 tablet (10 mg) by mouth nightly as needed for sleep 30 tablet 5     Allergies   Allergen Reactions     Hmg-Coa-R  Inhibitors Other (See Comments)     Numbness is legs       ROS:  Constitutional, HEENT, cardiovascular, pulmonary, GI, , musculoskeletal, neuro, skin, endocrine and psych systems are negative, except as otherwise noted.    OBJECTIVE:     /83  Pulse 74  Temp 98.5  F (36.9  C) (Oral)  Resp 16  Wt 190 lb (86.2 kg)  SpO2 98%  Breastfeeding? No  BMI 30.21 kg/m2  Body mass index is 30.21 kg/(m^2).  GENERAL: healthy, alert and no distress  EYES: Eyes grossly normal to inspection, PERRL and conjunctivae and sclerae normal  HENT: ear canals and TM's normal, nose and mouth without ulcers or lesions, no nystagmus noted  NECK: no adenopathy, no asymmetry, masses, or scars and thyroid normal to palpation  RESP: lungs clear to auscultation - no rales, rhonchi or wheezes  CV: regular rate and rhythm, normal S1 S2, no S3 or S4, no murmur, click or rub, no peripheral edema and peripheral pulses strong  MS: no gross musculoskeletal defects noted, no edema  SKIN: no suspicious lesions or rashes  NEURO: Normal strength and tone, sensory exam grossly normal, mentation intact, cranial nerves 2-12 intact, DTR's normal and symmetric , Romberg normal and rapid alternating movements normal  PSYCH: mentation appears normal, affect normal/bright      ASSESSMENT/PLAN:     ASSESSMENT / PLAN:  (R42) Vertigo  (primary encounter diagnosis)  Comment: BPPV, labyrinthitis, or central cause in differential. Will also get labs to rule out systemic cause.  Meclizine for symptoms and note given for work.  [facility] for management and treatment.     Plan: TSH with free T4 reflex, Comprehensive         metabolic panel, Ferritin, CBC with platelets         differential, Hemoglobin A1c, meclizine         (ANTIVERT) 25 MG tablet, NEUROLOGY ADULT         REFERRAL            (R11.0) Nausea  Comment:   Plan: Comprehensive metabolic panel, meclizine         (ANTIVERT) 25 MG tablet            (Z13.220) Lipid  screening  Comment:   Plan: Lipid panel reflex to direct LDL Fasting            To emergency room with severe worsening symptoms  I will follow up with labs  Do not mix alcohol or drive after taking meclizine as it may make you sleepy        Stella Santiago PA-C  [employer]

## 2018-04-25 NOTE — PROGRESS NOTES
Dear Benita,      It was a pleasure to see you at your recent office visit.  Your test results are listed below.  Cholesterol has improved some. Thyroid normal. Ferritin or iron storage normal. White and red blood cell counts normal.  No anemia.  Sodium, potassium, kidney and liver function normal.  Blood sugar normal, no diabetes.            If you have any questions or concerns, please call the clinic at [phone].    Sincerely,  Stella Santiago PA-C

## 2018-04-26 ENCOUNTER — TELEPHONE (OUTPATIENT)
Dept: FAMILY MEDICINE | Facility: CLINIC | Age: 53
End: 2018-04-26

## 2018-04-30 ENCOUNTER — TELEPHONE (OUTPATIENT)
Dept: FAMILY MEDICINE | Facility: CLINIC | Age: 53
End: 2018-04-30

## 2018-04-30 NOTE — TELEPHONE ENCOUNTER
Patient still having dizziness, feels slightly worse, feeling more dizzy, even just sitting or laying down feels dizzy, feels like she is spinning.   Patient has appointment this Thursday at 9am [facility].   Patient instructed to keep appointment with [facility] to discuss symptoms and treatment options    Morenita PRESCOTT, RN, CPN

## 2018-04-30 NOTE — TELEPHONE ENCOUNTER
She was recently seen for vertigo.  She still has lots of dizziness and has not returned to work due to this.  She tried to work last Friday, but had to leave. How long is vertigo supposed to last?

## 2018-05-03 ENCOUNTER — TRANSFERRED RECORDS (OUTPATIENT)
Dept: HEALTH INFORMATION MANAGEMENT | Facility: CLINIC | Age: 53
End: 2018-05-03

## 2018-05-08 ENCOUNTER — TELEPHONE (OUTPATIENT)
Dept: FAMILY MEDICINE | Facility: CLINIC | Age: 53
End: 2018-05-08

## 2018-05-08 NOTE — LETTER
May 8, 2018      Benita Benz  [id number] [facility] [address]        To Whom It May Concern:    Benita Benz  was seen on 4/24/18.  Please excuse her  until 5-8-18 due to illness.         Sincerely,        Stella Santiago PA-C

## 2018-05-16 ENCOUNTER — TELEPHONE (OUTPATIENT)
Dept: FAMILY MEDICINE | Facility: CLINIC | Age: 53
End: 2018-05-16

## 2018-05-16 NOTE — TELEPHONE ENCOUNTER
Reason for Call:  Form, our goal is to have forms completed with 72 hours, however, some forms may require a visit or additional information.    Type of letter, form or note:  FMLA    Who is the form from?: Patient    Where did the form come from: Patient or family brought in       What clinic location was the form placed at?: [place]    Where the form was placed: 's Box    What number is listed as a contact on the form?: [phone]       Additional comments: patient would like a call to      Call taken on 5/16/2018 at 10:13 AM by Maria Teresa Kwan

## 2018-05-18 NOTE — TELEPHONE ENCOUNTER
Stella completed forms. Left message for patient that forms are ready to be picked up at the .Sophie Martino MA/FRANCESCO

## 2018-05-24 ENCOUNTER — TRANSFERRED RECORDS (OUTPATIENT)
Dept: HEALTH INFORMATION MANAGEMENT | Facility: CLINIC | Age: 53
End: 2018-05-24

## 2018-08-06 ENCOUNTER — TELEPHONE (OUTPATIENT)
Dept: FAMILY MEDICINE | Facility: CLINIC | Age: 53
End: 2018-08-06

## 2018-08-06 NOTE — PROGRESS NOTES
SUBJECTIVE:                                                    Benita Benz is a 53 year old female who presents to clinic today for the following health issues:    Patient with multiple concerns:    1) [place]. [place]-no concerns.  Works well for sleep.  Takes as needed.    2) htn-needs refills.  No chest pain, shortness of breath, edema, PND, or orthopnea. No dizziness or vision changes. No side effects from medications. Blood pressure has been stable on medication.      3) pain medication-uses as needed for her right hip pain.  She is not seeing anyone for this recently.  Will refer to Tulio SCHULTE as we discussed that pain medications long-term are not a solution.  No concerns on [place] Registry.  She uses the pain medication very infrequently.  She is just on the pain gets bad.  4)    Check bump on rectal area per pt x 1 week patient has a history of hemorrhoids and constipation.  She uses MiraLAX and fiber supplements.. She has now noticed a bump that feels different than a hemorrhoid to her.  No pain.  No blood in her stool.    Preventative:  Due for mammogram.  We scheduled this today.        Problem list and histories reviewed & adjusted, as indicated.  Additional history: as documented    Patient Active Problem List   Diagnosis     Benign essential hypertension     Mild intermittent asthma without complication     Primary insomnia     Dysmenorrhea     Episodic tension-type headache, not intractable     Past Surgical History:   Procedure Laterality Date     EYE SURGERY  2014    toe surgery       Social History   Substance Use Topics     Smoking status: Former Smoker     Smokeless tobacco: Never Used     Alcohol use Yes      Comment: rare     Family History   Problem Relation Age of Onset     Diabetes Mother      Thyroid Disease Mother      Sarcoidosis Mother          Current Outpatient Prescriptions   Medication Sig Dispense Refill     albuterol (PROAIR HFA, PROVENTIL HFA, VENTOLIN HFA) 108 (90  "BASE) MCG/ACT inhaler Inhale 2 puffs into the lungs every 6 hours as needed for shortness of breath / dyspnea or wheezing 1 Inhaler 3     amLODIPine (NORVASC) 10 MG tablet Take 1 tablet (10 mg) by mouth daily 90 tablet 1     Flaxseed, Linseed, (FLAX SEED OIL) 1000 MG capsule Take 1 capsule by mouth daily       HYDROcodone-acetaminophen (NORCO) 5-325 MG per tablet Take 1 tablet by mouth every 8 hours as needed for moderate to severe pain 20 tablet 0     lisinopril (PRINIVIL/ZESTRIL) 40 MG tablet Take 1 tablet (40 mg) by mouth daily 90 tablet 1     meclizine (ANTIVERT) 25 MG tablet Take 1 tablet (25 mg) by mouth every 6 hours as needed for dizziness 30 tablet 1     Multiple Vitamins-Minerals (MULTIVITAMIN ADULT PO)        Omega-3 Fatty Acids (OMEGA-3 FISH OIL PO)        omeprazole (PRILOSEC) 20 MG CR capsule TAKE ONE CAPSULE BY MOUTH ONCE DAILY 90 capsule 1     zolpidem (AMBIEN) 10 MG tablet Take 1 tablet (10 mg) by mouth nightly as needed for sleep 30 tablet 5     [DISCONTINUED] amLODIPine (NORVASC) 10 MG tablet Take 1 tablet (10 mg) by mouth daily 90 tablet 1     [DISCONTINUED] lisinopril (PRINIVIL/ZESTRIL) 40 MG tablet Take 1 tablet (40 mg) by mouth daily 90 tablet 1     Allergies   Allergen Reactions     Hmg-Coa-R Inhibitors Other (See Comments)     Numbness is legs       ROS:  Constitutional, HEENT, cardiovascular, pulmonary, GI, , musculoskeletal, neuro, skin, endocrine and psych systems are negative, except as otherwise noted.    OBJECTIVE:     /70  Pulse 91  Temp 97.5  F (36.4  C) (Oral)  Resp 20  Ht 5' 6.5\" (1.689 m)  Wt 194 lb (88 kg)  SpO2 96%  BMI 30.84 kg/m2  Body mass index is 30.84 kg/(m^2).  GENERAL: healthy, alert and no distress  RESP: lungs clear to auscultation - no rales, rhonchi or wheezes  CV: regular rate and rhythm, normal S1 S2, no S3 or S4, no murmur, click or rub, no peripheral edema and peripheral pulses strong   (female): {:just near the rectum there is a small pea sized " firm mass present.  Seems more consistent with a cyst than hemorrhoid or skin tag. Not tender.  No erythema or signs of infection. No pustule.     MS: no gross musculoskeletal defects noted, no edema  SKIN: no suspicious lesions or rashes  NEURO: Normal strength and tone, mentation intact and speech normal        ASSESSMENT/PLAN:   ASSESSMENT / PLAN:  (Z12.31) Visit for screening mammogram  (primary encounter diagnosis)  Comment:   Plan: MA SCREENING DIGITAL BILAT - Future  (s+30)            (I10) Benign essential hypertension  Comment:   Plan: amLODIPine (NORVASC) 10 MG tablet, lisinopril         (PRINIVIL/ZESTRIL) 40 MG tablet            (K21.9) Gastroesophageal reflux disease without esophagitis  Comment:   Plan: omeprazole (PRILOSEC) 20 MG CR capsule            (F51.01) Primary insomnia  Comment:   Plan: zolpidem (AMBIEN) 10 MG tablet        See below    (Z00.01) Encounter for routine adult health examination with abnormal findings  Comment:   Plan: HYDROcodone-acetaminophen (NORCO) 5-325 MG per         tablet            (K62.89) Rectal cyst  Comment:   Plan: COLORECTAL SURGERY REFERRAL        Monitor for now  See colo-rectal if worse      (M25.551) Hip pain, right  Comment:   Plan: ORTHO  REFERRAL            Patient Instructions   Recheck 6 months for ambien  Never take ambien and norco within 4 hours of eachother    Schedule with ortho for hip    Schedule with rectal surgeon only if needed for your rectal cyst      -Narcotic medications can be addicting and therefore are used for short term pain control only.  They are not intended for long-term use.    -Take them only for severe pain as needed.    -Never mix with alcohol.    -Do not drive after taking this medication.    -No refills without an office visit. No replacements will be given.    -Keep these medications kept in a safe location.  You are responsible for them.  -Do not give them to anyone else.  They are prescribed to you only.            Patient Instructions   Recheck 6 months for ambien  Never take ambien and norco within 4 hours of eachother    Schedule with ortho for hip    Schedule with rectal surgeon only if needed for your rectal cyst      -Narcotic medications can be addicting and therefore are used for short term pain control only.  They are not intended for long-term use.    -Take them only for severe pain as needed.    -Never mix with alcohol.    -Do not drive after taking this medication.    -No refills without an office visit. No replacements will be given.    -Keep these medications kept in a safe location.  You are responsible for them.  -Do not give them to anyone else.  They are prescribed to you only.           Stella Santiago PA-C  [facility]

## 2018-08-07 NOTE — TELEPHONE ENCOUNTER
Called and spoke to patient. She was looking for when her last pap smear was. I informed her the next is due 1/10/2020.Sophie Martino MA/FRANCESCO

## 2018-08-10 ENCOUNTER — RADIANT APPOINTMENT (OUTPATIENT)
Dept: MAMMOGRAPHY | Facility: CLINIC | Age: 53
End: 2018-08-10
Attending: PHYSICIAN ASSISTANT
Payer: COMMERCIAL

## 2018-08-10 ENCOUNTER — OFFICE VISIT (OUTPATIENT)
Dept: FAMILY MEDICINE | Facility: CLINIC | Age: 53
End: 2018-08-10
Payer: COMMERCIAL

## 2018-08-10 VITALS
TEMPERATURE: 97.5 F | HEIGHT: 67 IN | OXYGEN SATURATION: 96 % | DIASTOLIC BLOOD PRESSURE: 70 MMHG | SYSTOLIC BLOOD PRESSURE: 100 MMHG | RESPIRATION RATE: 20 BRPM | HEART RATE: 91 BPM | WEIGHT: 194 LBS | BODY MASS INDEX: 30.45 KG/M2

## 2018-08-10 DIAGNOSIS — K21.9 GASTROESOPHAGEAL REFLUX DISEASE WITHOUT ESOPHAGITIS: ICD-10-CM

## 2018-08-10 DIAGNOSIS — I10 BENIGN ESSENTIAL HYPERTENSION: ICD-10-CM

## 2018-08-10 DIAGNOSIS — K62.89 RECTAL CYST: ICD-10-CM

## 2018-08-10 DIAGNOSIS — Z12.31 VISIT FOR SCREENING MAMMOGRAM: Primary | ICD-10-CM

## 2018-08-10 DIAGNOSIS — Z12.31 VISIT FOR SCREENING MAMMOGRAM: ICD-10-CM

## 2018-08-10 DIAGNOSIS — F51.01 PRIMARY INSOMNIA: ICD-10-CM

## 2018-08-10 DIAGNOSIS — M25.551 HIP PAIN, RIGHT: ICD-10-CM

## 2018-08-10 DIAGNOSIS — Z00.01 ENCOUNTER FOR ROUTINE ADULT HEALTH EXAMINATION WITH ABNORMAL FINDINGS: ICD-10-CM

## 2018-08-10 PROCEDURE — 99214 OFFICE O/P EST MOD 30 MIN: CPT | Performed by: PHYSICIAN ASSISTANT

## 2018-08-10 PROCEDURE — 77067 SCR MAMMO BI INCL CAD: CPT | Mod: TC

## 2018-08-10 RX ORDER — LISINOPRIL 40 MG/1
40 TABLET ORAL DAILY
Qty: 90 TABLET | Refills: 1 | Status: SHIPPED | OUTPATIENT
Start: 2018-08-10 | End: 2019-07-31

## 2018-08-10 RX ORDER — HYDROCODONE BITARTRATE AND ACETAMINOPHEN 5; 325 MG/1; MG/1
1 TABLET ORAL EVERY 8 HOURS PRN
Qty: 20 TABLET | Refills: 0 | Status: SHIPPED | OUTPATIENT
Start: 2018-08-10 | End: 2020-01-07

## 2018-08-10 RX ORDER — ZOLPIDEM TARTRATE 10 MG/1
10 TABLET ORAL
Qty: 30 TABLET | Refills: 5 | Status: SHIPPED | OUTPATIENT
Start: 2018-08-10 | End: 2022-10-07

## 2018-08-10 RX ORDER — AMLODIPINE BESYLATE 10 MG/1
10 TABLET ORAL DAILY
Qty: 90 TABLET | Refills: 1 | Status: SHIPPED | OUTPATIENT
Start: 2018-08-10 | End: 2019-07-31

## 2018-08-10 NOTE — PATIENT INSTRUCTIONS
Recheck 6 months for ambien  Never take ambien and norco within 4 hours of eachother    Schedule with ortho for hip    Schedule with rectal surgeon only if needed for your rectal cyst      -Narcotic medications can be addicting and therefore are used for short term pain control only.  They are not intended for long-term use.    -Take them only for severe pain as needed.    -Never mix with alcohol.    -Do not drive after taking this medication.    -No refills without an office visit. No replacements will be given.    -Keep these medications kept in a safe location.  You are responsible for them.  -Do not give them to anyone else.  They are prescribed to you only.

## 2018-08-10 NOTE — LETTER
GREEN ZONE   Good Control    I feel good    No cough or wheeze    Can work, sleep and play without asthma symptoms       Take your asthma control medicine every day.     1. If exercise triggers your asthma, take your rescue medication    15 minutes before exercise or sports, and    During exercise if you have asthma symptoms  2. Spacer to use with inhaler: If you have a spacer, make sure to use it with your inhaler             YELLOW ZONE Getting Worse  I have ANY of these:    I do not feel good    Cough or wheeze    Chest feels tight    Wake up at night   1. Keep taking your Green Zone medications  2. Start taking your rescue medicine:    every 20 minutes for up to 1 hour. Then every 4 hours for 24-48 hours.  3. If you stay in the Yellow Zone for more than 12-24 hours, contact your doctor.  4. If you do not return to the Green Zone in 12-24 hours or you get worse, start taking your oral steroid medicine if prescribed by your provider.           RED ZONE Medical Alert - Get Help  I have ANY of these:    I feel awful    Medicine is not helping    Breathing getting harder    Trouble walking or talking    Nose opens wide to breathe       1. Take your rescue medicine NOW  2. If your provider has prescribed an oral steroid medicine, start taking it NOW  3. Call your doctor NOW  4. If you are still in the Red Zone after 20 minutes and you have not reached your doctor:    Take your rescue medicine again and    Call 911 or go to the emergency room right away    See your regular doctor within 2 weeks of an Emergency Room or Urgent Care visit for follow-up treatment.          Annual Reminders:  Meet with Asthma Educator,  Flu Shot in the Fall, consider Pneumonia Vaccination for patients with asthma (aged 19 and older).    Pharmacy: [id number] - [place], [place] - [address] [address] [facility]                      Asthma Triggers  How To Control Things That Make Your Asthma Worse    Triggers are things that make your  asthma worse.  Look at the list below to help you find your triggers and what you can do about them.  You can help prevent asthma flare-ups by staying away from your triggers.      Trigger                                                          What you can do   Cigarette Smoke  Tobacco smoke can make asthma worse. Do not allow smoking in your home, car or around you.  Be sure no one smokes at a child s day care or school.  If you smoke, ask your health care provider for ways to help you quit.  Ask family members to quit too.  Ask your health care provider for a referral to Quit Plan to help you quit smoking, or call 0-155-728-PLAN.     Colds, Flu, Bronchitis  These are common triggers of asthma. Wash your hands often.  Don t touch your eyes, nose or mouth.  Get a flu shot every year.     Dust Mites  These are tiny bugs that live in cloth or carpet. They are too small to see. Wash sheets and blankets in hot water every week.   Encase pillows and mattress in dust mite proof covers.  Avoid having carpet if you can. If you have carpet, vacuum weekly.   Use a dust mask and HEPA vacuum.   Pollen and Outdoor Mold  Some people are allergic to trees, grass, or weed pollen, or molds. Try to keep your windows closed.  Limit time out doors when pollen count is high.   Ask you health care provider about taking medicine during allergy season.     Animal Dander  Some people are allergic to skin flakes, urine or saliva from pets with fur or feathers. Keep pets with fur or feathers out of your home.    If you can t keep the pet outdoors, then keep the pet out of your bedroom.  Keep the bedroom door closed.  Keep pets off cloth furniture and away from stuffed toys.     Mice, Rats, and Cockroaches  Some people are allergic to the waste from these pests.   Cover food and garbage.  Clean up spills and food crumbs.  Store grease in the refrigerator.   Keep food out of the bedroom.   Indoor Mold  This can be a trigger if your home has  high moisture. Fix leaking faucets, pipes, or other sources of water.   Clean moldy surfaces.  Dehumidify basement if it is damp and smelly.   Smoke, Strong Odors, and Sprays  These can reduce air quality. Stay away from strong odors and sprays, such as perfume, powder, hair spray, paints, smoke incense, paint, cleaning products, candles and new carpet.   Exercise or Sports  Some people with asthma have this trigger. Be active!  Ask your doctor about taking medicine before sports or exercise to prevent symptoms.    Warm up for 5-10 minutes before and after sports or exercise.     Other Triggers of Asthma  Cold air:  Cover your nose and mouth with a scarf.  Sometimes laughing or crying can be a trigger.  Some medicines and food can trigger asthma.

## 2018-08-10 NOTE — MR AVS SNAPSHOT
After Visit Summary   8/10/2018    Benita Benz    MRN: [id number]           Patient Information     Date Of Birth          1965        Visit Information        Provider Department      8/10/2018 9:40 AM Stella Santiago PA-C [facility]        Today's Diagnoses     Visit for screening mammogram    -  1    Benign essential hypertension        Gastroesophageal reflux disease without esophagitis        Primary insomnia        Encounter for routine adult health examination with abnormal findings        Rectal cyst        Hip pain, right          Care Instructions    Recheck 6 months for ambien  Never take ambien and norco within 4 hours of eachother    Schedule with ortho for hip    Schedule with rectal surgeon only if needed for your rectal cyst      -Narcotic medications can be addicting and therefore are used for short term pain control only.  They are not intended for long-term use.    -Take them only for severe pain as needed.    -Never mix with alcohol.    -Do not drive after taking this medication.    -No refills without an office visit. No replacements will be given.    -Keep these medications kept in a safe location.  You are responsible for them.  -Do not give them to anyone else.  They are prescribed to you only.             Follow-ups after your visit        Additional Services     COLORECTAL SURGERY REFERRAL       Your provider has referred you to: [facility] - [place] [phone]   [email or web address]    Referral Reason(s): rectal cyst  Special Concerns: None  This referral is: Elective (week +)  It is not OK to leave a message on patient's voicemail.    Please be aware that coverage of these services is subject to the terms and limitations of your health insurance plan.  Call member services at your health plan with any benefit or coverage questions.      Please bring the following with you to your  appointment:    (1) Any X-Rays, CTs or MRIs which have been performed.  Contact the facility where they were done to arrange for  prior to your scheduled appointment.    (2) List of current medications  (3) This referral request   (4) Any documents/labs given to you for this referral            ORTHO  REFERRAL       [facility] is referring you to the Orthopedic  Services at [facility].       The  Representative will assist you in the coordination of your Orthopedic and Musculoskeletal Care as prescribed by your physician.    The  Representative will call you within 1 business day to help schedule your appointment, or you may contact the  Representative at:    All areas ~ [phone]     Type of Referral : Non Surgical       Timeframe requested: Routine    Coverage of these services is subject to the terms and limitations of your health insurance plan.  Please call member services at your health plan with any benefit or coverage questions.      If X-rays, CT or MRI's have been performed, please contact the facility where they were done to arrange for , prior to your scheduled appointment.  Please bring this referral request to your appointment and present it to your specialist.                  Your next 10 appointments already scheduled     Aug 10, 2018 12:45 PM CDT   MA SCREENING DIGITAL BIL[facility]AL with [facility]   [facility]    [address] [facility] [address] [phone]           Do not use any powder, lotion or deodorant under your arms or on your breast. If you do, we will ask you to remove it before your exam.  Wear comfortable, two-piece clothing.  If you have any allergies, tell your care team.  Bring any previous mammograms from other facilities or have them mailed to the breast center.              Future tests that were ordered for you today     Open  "Future Orders        Priority Expected Expires Ordered    MA SCREENING DIGITAL BILAT - Future  (s+30) Routine  8/6/2019 8/10/2018            Who to contact     If you have questions or need follow up information about today's clinic visit or your schedule please contact [facility] directly at [phone].  Normal or non-critical lab and imaging results will be communicated to you by MyChart, letter or phone within 4 business days after the clinic has received the results. If you do not hear from us within 7 days, please contact the clinic through MyChart or phone. If you have a critical or abnormal lab result, we will notify you by phone as soon as possible.  Submit refill requests through [employer] or call your pharmacy and they will forward the refill request to us. Please allow 3 business days for your refill to be completed.          Additional Information About Your Visit        Care EveryWhere ID     This is your Care EveryWhere ID. This could be used by other organizations to access your [place] medical records  [id number]        Your Vitals Were     Pulse Temperature Respirations Height Pulse Oximetry BMI (Body Mass Index)    91 97.5  F (36.4  C) (Oral) 20 5' 6.5\" (1.689 m) 96% 30.84 kg/m2       Blood Pressure from Last 3 Encounters:   08/10/18 100/70   04/24/18 129/83   01/26/18 138/88    Weight from Last 3 Encounters:   08/10/18 194 lb (88 kg)   04/24/18 190 lb (86.2 kg)   01/26/18 186 lb (84.4 kg)              We Performed the Following     COLORECTAL SURGERY REFERRAL     ORTHO [facility] REFERRAL          Where to get your medicines      These medications were sent to [id number] - [place], [place] - [address]  [address] [facility], [place] [address]     Phone:  [phone]     amLODIPine 10 MG tablet    lisinopril 40 MG tablet    omeprazole 20 MG CR capsule         Some of these will need a paper prescription and others can be bought over the counter.  Ask your " nurse if you have questions.     Bring a paper prescription for each of these medications     HYDROcodone-acetaminophen 5-325 MG per tablet    zolpidem 10 MG tablet         Information about OPIOIDS     PRESCRIPTION OPIOIDS: WHAT YOU NEED TO KNOW   We gave you an opioid (narcotic) pain medicine. It is important to manage your pain, but opioids are not always the best choice. You should first try all the other options your care team gave you. Take this medicine for as short a time (and as few doses) as possible.    Some activities can increase your pain, such as bandage changes or therapy sessions. It may help to take your pain medicine 30 to 60 minutes before these activities. Reduce your stress by getting enough sleep, working on hobbies you enjoy and practicing relaxation or meditation. Talk to your care team about ways to manage your pain beyond prescription opioids.    These medicines have risks:    DO NOT drive when on new or higher doses of pain medicine. These medicines can affect your alertness and reaction times, and you could be arrested for driving under the influence (DUI). If you need to use opioids long-term, talk to your care team about driving.    DO NOT operate heavy machinery    DO NOT do any other dangerous activities while taking these medicines.    DO NOT drink any alcohol while taking these medicines.     If the opioid prescribed includes acetaminophen, DO NOT take with any other medicines that contain acetaminophen. Read all labels carefully. Look for the word  acetaminophen  or  Tylenol.  Ask your pharmacist if you have questions or are unsure.    You can get addicted to pain medicines, especially if you have a history of addiction (chemical, alcohol or substance dependence). Talk to your care team about ways to reduce this risk.    All opioids tend to cause constipation. Drink plenty of water and eat foods that have a lot of fiber, such as fruits, vegetables, prune juice, apple juice and  high-fiber cereal. Take a laxative (Miralax, milk of magnesia, Colace, Senna) if you don t move your bowels at least every other day. Other side effects include upset stomach, sleepiness, dizziness, throwing up, tolerance (needing more of the medicine to have the same effect), physical dependence and slowed breathing.    Store your pills in a secure place, locked if possible. We will not replace any lost or stolen medicine. If you don t finish your medicine, please throw away (dispose) as directed by your pharmacist. The [place] [employer]ution [employer] has more information about safe disposal: [email or web address]://[email or web address].[facility].us/living-green/managing-unwanted-medications         Primary Care Provider Office Phone # Fax #    [facility] [phone] [address] [facility] [address]        Equal Access to Services     WALLY ROTHMAN : Bossman lovett Somoshe, waaxda luqdanni, qaybta kaalmada javier, lj hinson . So [facility] [phone].    ATENCIÓN: Si habla español, tiene a reyes disposición servicios gratuitos de asistencia lingüística. Sowmyaame al [phone].    We comply with applicable federal civil rights laws and [place] laws. We do not discriminate on the basis of race, color, national origin, age, disability, sex, sexual orientation, or gender identity.            Thank you!     Thank you for choosing [facility] [place]  for your care. Our goal is always to provide you with excellent care. Hearing back from our patients is one way we can continue to improve our services. Please take a few minutes to complete the written survey that you may receive in the mail after your visit with us. Thank you!             Your Updated Medication List - Protect others around you: Learn how to safely use, store and throw away your medicines at www.disposemymeds.org.          This list is accurate as of 8/10/18 10:28 AM.  Always use your most recent med  list.                   Brand Name Dispense Instructions for use Diagnosis    albuterol 108 (90 Base) MCG/ACT Inhaler    PROAIR HFA/PROVENTIL HFA/VENTOLIN HFA    1 Inhaler    Inhale 2 puffs into the lungs every 6 hours as needed for shortness of breath / dyspnea or wheezing    Mild intermittent asthma without complication       amLODIPine 10 MG tablet    NORVASC    90 tablet    Take 1 tablet (10 mg) by mouth daily    Benign essential hypertension       flax seed oil 1000 MG capsule      Take 1 capsule by mouth daily        HYDROcodone-acetaminophen 5-325 MG per tablet    NORCO    20 tablet    Take 1 tablet by mouth every 8 hours as needed for moderate to severe pain    Encounter for routine adult health examination with abnormal findings       lisinopril 40 MG tablet    PRINIVIL/ZESTRIL    90 tablet    Take 1 tablet (40 mg) by mouth daily    Benign essential hypertension       meclizine 25 MG tablet    ANTIVERT    30 tablet    Take 1 tablet (25 mg) by mouth every 6 hours as needed for dizziness    Vertigo, Nausea       MULTIVITAMIN ADULT PO           OMEGA-3 FISH OIL PO           omeprazole 20 MG CR capsule    priLOSEC    90 capsule    TAKE ONE CAPSULE BY MOUTH ONCE DAILY    Gastroesophageal reflux disease without esophagitis       zolpidem 10 MG tablet    AMBIEN    30 tablet    Take 1 tablet (10 mg) by mouth nightly as needed for sleep    Primary insomnia

## 2018-08-10 NOTE — NURSING NOTE
"Chief Complaint   Patient presents with     Mass     Check bump in rectal area      Health Maintenance     orders pended       Initial /70  Pulse 91  Temp 97.5  F (36.4  C) (Oral)  Resp 20  Ht 5' 6.5\" (1.689 m)  Wt 194 lb (88 kg)  SpO2 96%  BMI 30.84 kg/m2 Estimated body mass index is 30.84 kg/(m^2) as calculated from the following:    Height as of this encounter: 5' 6.5\" (1.689 m).    Weight as of this encounter: 194 lb (88 kg).    Miriam Aly CMA      "

## 2018-08-11 ASSESSMENT — ASTHMA QUESTIONNAIRES: ACT_TOTALSCORE: 22

## 2018-10-11 ENCOUNTER — OFFICE VISIT (OUTPATIENT)
Dept: FAMILY MEDICINE | Facility: CLINIC | Age: 53
End: 2018-10-11
Payer: COMMERCIAL

## 2018-10-11 VITALS
TEMPERATURE: 99.1 F | HEART RATE: 92 BPM | OXYGEN SATURATION: 98 % | SYSTOLIC BLOOD PRESSURE: 129 MMHG | WEIGHT: 193 LBS | BODY MASS INDEX: 30.68 KG/M2 | RESPIRATION RATE: 16 BRPM | DIASTOLIC BLOOD PRESSURE: 79 MMHG

## 2018-10-11 DIAGNOSIS — N64.4 MASTALGIA: ICD-10-CM

## 2018-10-11 DIAGNOSIS — I10 BENIGN ESSENTIAL HYPERTENSION: Primary | ICD-10-CM

## 2018-10-11 DIAGNOSIS — R35.0 URINARY FREQUENCY: ICD-10-CM

## 2018-10-11 LAB
ALBUMIN UR-MCNC: NEGATIVE MG/DL
APPEARANCE UR: CLEAR
BILIRUB UR QL STRIP: NEGATIVE
COLOR UR AUTO: YELLOW
GLUCOSE UR STRIP-MCNC: NEGATIVE MG/DL
HGB UR QL STRIP: ABNORMAL
KETONES UR STRIP-MCNC: NEGATIVE MG/DL
LEUKOCYTE ESTERASE UR QL STRIP: NEGATIVE
NITRATE UR QL: NEGATIVE
NON-SQ EPI CELLS #/AREA URNS LPF: NORMAL /LPF
PH UR STRIP: 6 PH (ref 5–7)
RBC #/AREA URNS AUTO: NORMAL /HPF
SOURCE: ABNORMAL
SP GR UR STRIP: 1.01 (ref 1–1.03)
UROBILINOGEN UR STRIP-ACNC: 0.2 EU/DL (ref 0.2–1)
WBC #/AREA URNS AUTO: NORMAL /HPF

## 2018-10-11 PROCEDURE — 81001 URINALYSIS AUTO W/SCOPE: CPT | Performed by: PHYSICIAN ASSISTANT

## 2018-10-11 PROCEDURE — 99214 OFFICE O/P EST MOD 30 MIN: CPT | Performed by: PHYSICIAN ASSISTANT

## 2018-10-11 ASSESSMENT — ANXIETY QUESTIONNAIRES
1. FEELING NERVOUS, ANXIOUS, OR ON EDGE: NOT AT ALL
IF YOU CHECKED OFF ANY PROBLEMS ON THIS QUESTIONNAIRE, HOW DIFFICULT HAVE THESE PROBLEMS MADE IT FOR YOU TO DO YOUR WORK, TAKE CARE OF THINGS AT HOME, OR GET ALONG WITH OTHER PEOPLE: NOT DIFFICULT AT ALL
3. WORRYING TOO MUCH ABOUT DIFFERENT THINGS: SEVERAL DAYS
2. NOT BEING ABLE TO STOP OR CONTROL WORRYING: NOT AT ALL
GAD7 TOTAL SCORE: 2
7. FEELING AFRAID AS IF SOMETHING AWFUL MIGHT HAPPEN: NOT AT ALL
5. BEING SO RESTLESS THAT IT IS HARD TO SIT STILL: NOT AT ALL
6. BECOMING EASILY ANNOYED OR IRRITABLE: NOT AT ALL

## 2018-10-11 ASSESSMENT — PATIENT HEALTH QUESTIONNAIRE - PHQ9: 5. POOR APPETITE OR OVEREATING: SEVERAL DAYS

## 2018-10-11 NOTE — PROGRESS NOTES
SUBJECTIVE:                                                    Benita Benz is a 53 year old female who presents to clinic today for the following health issues:    R breast pain per pt x 2 days tenderness, itchy and pain.  Just right side of her breast. No rash.  No pain today per patient but had it 2 days ago. Did not feel a lump at all. no nipple discharge. No weight loss.  Screening MAMMO was completed a few months ago results were normal.  Aunt had breast cancer per patient. She is concerned about it. Tried heat, helped some.  Did not try nsaids.          Also:  Has been going to the bathroom for over a month.   Stopped hydrochlorothiazide, still peeing a lot. Is on lisinopril now.  No pain with urination.  No incontinence.  Has urgency.  Sometimes wears pads JIC.   No leg edema. No cough or PND. No excessive thirst or hunger. No h/o diabetes.         Problem list and histories reviewed & adjusted, as indicated.  Additional history: as documented    Patient Active Problem List   Diagnosis     Benign essential hypertension     Mild intermittent asthma without complication     Primary insomnia     Dysmenorrhea     Episodic tension-type headache, not intractable     Past Surgical History:   Procedure Laterality Date     EYE SURGERY  2014    toe surgery       Social History   Substance Use Topics     Smoking status: Former Smoker     Smokeless tobacco: Never Used     Alcohol use Yes      Comment: rare     Family History   Problem Relation Age of Onset     Diabetes Mother      Thyroid Disease Mother      Sarcoidosis Mother          Current Outpatient Prescriptions   Medication Sig Dispense Refill     albuterol (PROAIR HFA, PROVENTIL HFA, VENTOLIN HFA) 108 (90 BASE) MCG/ACT inhaler Inhale 2 puffs into the lungs every 6 hours as needed for shortness of breath / dyspnea or wheezing 1 Inhaler 3     amLODIPine (NORVASC) 10 MG tablet Take 1 tablet (10 mg) by mouth daily 90 tablet 1     Flaxseed, Linseed, (FLAX SEED  OIL) 1000 MG capsule Take 1 capsule by mouth daily       HYDROcodone-acetaminophen (NORCO) 5-325 MG per tablet Take 1 tablet by mouth every 8 hours as needed for moderate to severe pain 20 tablet 0     lisinopril (PRINIVIL/ZESTRIL) 40 MG tablet Take 1 tablet (40 mg) by mouth daily 90 tablet 1     omeprazole (PRILOSEC) 20 MG CR capsule TAKE ONE CAPSULE BY MOUTH ONCE DAILY 90 capsule 1     zolpidem (AMBIEN) 10 MG tablet Take 1 tablet (10 mg) by mouth nightly as needed for sleep 30 tablet 5     meclizine (ANTIVERT) 25 MG tablet Take 1 tablet (25 mg) by mouth every 6 hours as needed for dizziness (Patient not taking: Reported on 10/11/2018) 30 tablet 1     Multiple Vitamins-Minerals (MULTIVITAMIN ADULT PO)        Omega-3 Fatty Acids (OMEGA-3 FISH OIL PO)        Allergies   Allergen Reactions     Hmg-Coa-R Inhibitors Other (See Comments)     Numbness is legs       ROS:  Constitutional, HEENT, cardiovascular, pulmonary, gi and gu systems are negative, except as otherwise noted.    OBJECTIVE:     /79  Pulse 92  Temp 99.1  F (37.3  C) (Oral)  Resp 16  Wt 193 lb (87.5 kg)  SpO2 98%  Breastfeeding? No  BMI 30.68 kg/m2  Body mass index is 30.68 kg/(m^2).  GENERAL: alert, no distress and obese  NECK: no adenopathy, no asymmetry, masses, or scars and thyroid normal to palpation  RESP: lungs clear to auscultation - no rales, rhonchi or wheezes  BREAST: {:Tender right breast near nipple 9 oclock. Small mobile firm golf ball sized ? Cyst present.  Nipples normal. No axillary LN.   CV: regular rate and rhythm, normal S1 S2, no S3 or S4, no murmur, click or rub, no peripheral edema and peripheral pulses strong  MS: no gross musculoskeletal defects noted, no edema  NEURO: Normal strength and tone, mentation intact and speech normal  PSYCH: mentation appears normal, affect normal/bright    Results for orders placed or performed in visit on 10/11/18 (from the past 24 hour(s))   UA reflex to Microscopic   Result Value Ref  Range    Color Urine Yellow     Appearance Urine Clear     Glucose Urine Negative NEG^Negative mg/dL    Bilirubin Urine Negative NEG^Negative    Ketones Urine Negative NEG^Negative mg/dL    Specific Gravity Urine 1.010 1.003 - 1.035    Blood Urine Trace (A) NEG^Negative    pH Urine 6.0 5.0 - 7.0 pH    Protein Albumin Urine Negative NEG^Negative mg/dL    Urobilinogen Urine 0.2 0.2 - 1.0 EU/dL    Nitrite Urine Negative NEG^Negative    Leukocyte Esterase Urine Negative NEG^Negative    Source Midstream Urine          ASSESSMENT/PLAN:   ASSESSMENT / PLAN:  (I10) Benign essential hypertension  (primary encounter diagnosis)  Comment:   Plan: Urine Microscopic            (N64.4) Mastalgia  Comment:   Plan: MA Diagnostic Digital Bilateral        Likely fibrocystic disease but new concerns so need diagnostic imaging to make sure not mass/cancer    Heat/nsaids for pain control although she is better today    (R35.0) Urinary frequency  Comment: see below  Plan: UA reflex to Microscopic, UROLOGY ADULT         REFERRAL            Patient Instructions   Urine looks normal schedule with urology to see why you have your symptoms  No infection or blood sugar/glucose seen which is good    [facility] to schedule breast [facility].  Call .      Billin min spent face-to-face with patient. 15 min on history, 5 on exam, 5 on discussing diagnosis and treatment plan.       Stella Santiago PA-C  [facility]

## 2018-10-11 NOTE — NURSING NOTE
"Chief Complaint   Patient presents with     Breast Pain     Pain in the R breast per pt x 2 day no known injury     Health Maintenance     orders pended       Initial /79  Pulse 92  Temp 99.1  F (37.3  C) (Oral)  Resp 16  Wt 193 lb (87.5 kg)  SpO2 98%  Breastfeeding? No  BMI 30.68 kg/m2 Estimated body mass index is 30.68 kg/(m^2) as calculated from the following:    Height as of 8/10/18: 5' 6.5\" (1.689 m).    Weight as of this encounter: 193 lb (87.5 kg).  Medication Reconciliation: complete      Aquiles Kwan MA    "

## 2018-10-11 NOTE — PATIENT INSTRUCTIONS
Urine looks normal schedule with urology to see why you have your symptoms  No infection or blood sugar/glucose seen which is good    [facility] to schedule breast [facility].  Call .

## 2018-10-11 NOTE — MR AVS SNAPSHOT
After Visit Summary   10/11/2018    Benita Benz    MRN: [id number]           Patient Information     Date Of Birth          1965        Visit Information        Provider Department      10/11/2018 1:20 PM Stella Santiago PA-C [facility]        Today's Diagnoses     Benign essential hypertension    -  1    Mastalgia        Urinary frequency          Care Instructions    Urine looks normal schedule with urology to see why you have your symptoms  No infection or blood sugar/glucose seen which is good    [facility] to schedule breast [facility].  Call .            Follow-ups after your visit        Additional Services     UROLOGY ADULT REFERRAL       Your provider has referred you to: [facility]: [place] [facility] [phone]   [email or web address]    Please be aware that coverage of these services is subject to the terms and limitations of your health insurance plan.  Call member services at your health plan with any benefit or coverage questions.      Please bring the following with you to your appointment:    (1) Any X-Rays, CTs or MRIs which have been performed.  Contact the facility where they were done to arrange for  prior to your scheduled appointment.    (2) List of current medications  (3) This referral request   (4) Any documents/labs given to you for this referral                  Future tests that were ordered for you today     Open Future Orders        Priority Expected Expires Ordered    MA Diagnostic Digital Bilateral Routine  10/11/2019 10/11/2018            Who to contact     If you have questions or need follow up information about today's clinic visit or your schedule please contact [facility] directly at [phone].  Normal or non-critical lab and imaging results will be communicated to you by MyChart, letter or phone within 4 business days after the  clinic has received the results. If you do not hear from us within 7 days, please contact the clinic through [employer] or phone. If you have a critical or abnormal lab result, we will notify you by phone as soon as possible.  Submit refill requests through [employer] or call your pharmacy and they will forward the refill request to us. Please allow 3 business days for your refill to be completed.          Additional Information About Your Visit        Care EveryWhere ID     This is your Care EveryWhere ID. This could be used by other organizations to access your [place] medical records  [id number]        Your Vitals Were     Pulse Temperature Respirations Pulse Oximetry Breastfeeding? BMI (Body Mass Index)    92 99.1  F (37.3  C) (Oral) 16 98% No 30.68 kg/m2       Blood Pressure from Last 3 Encounters:   10/11/18 129/79   08/10/18 100/70   04/24/18 129/83    Weight from Last 3 Encounters:   10/11/18 193 lb (87.5 kg)   08/10/18 194 lb (88 kg)   04/24/18 190 lb (86.2 kg)              We Performed the Following     UA reflex to Microscopic     Urine Microscopic     UROLOGY ADULT REFERRAL        Primary Care Provider Office Phone # Fax #    [facility] [phone] [address] [facility] [address]        Equal Access to Services     WALLY ROTHMAN : Hadii gerson ku hadasho Soomaali, waaxda luqadaha, qaybta kaalmada adeegyada, waxheather spicer. So [facility] [phone].    ATENCIÓN: Si habla español, tiene a reyes disposición servicios gratuitos de asistencia lingüística. Llame al [phone].    We comply with applicable federal civil rights laws and [place] laws. We do not discriminate on the basis of race, color, national origin, age, disability, sex, sexual orientation, or gender identity.            Thank you!     Thank you for choosing [facility]  for your care. Our goal is always to provide you with excellent care. Hearing back from our patients is  one way we can continue to improve our services. Please take a few minutes to complete the written survey that you may receive in the mail after your visit with us. Thank you!             Your Updated Medication List - Protect others around you: Learn how to safely use, store and throw away your medicines at www.disposemymeds.org.          This list is accurate as of 10/11/18  2:41 PM.  Always use your most recent med list.                   Brand Name Dispense Instructions for use Diagnosis    albuterol 108 (90 Base) MCG/ACT inhaler    PROAIR HFA/PROVENTIL HFA/VENTOLIN HFA    1 Inhaler    Inhale 2 puffs into the lungs every 6 hours as needed for shortness of breath / dyspnea or wheezing    Mild intermittent asthma without complication       amLODIPine 10 MG tablet    NORVASC    90 tablet    Take 1 tablet (10 mg) by mouth daily    Benign essential hypertension       flax seed oil 1000 MG capsule      Take 1 capsule by mouth daily        HYDROcodone-acetaminophen 5-325 MG per tablet    NORCO    20 tablet    Take 1 tablet by mouth every 8 hours as needed for moderate to severe pain    Encounter for routine adult health examination with abnormal findings       lisinopril 40 MG tablet    PRINIVIL/ZESTRIL    90 tablet    Take 1 tablet (40 mg) by mouth daily    Benign essential hypertension       meclizine 25 MG tablet    ANTIVERT    30 tablet    Take 1 tablet (25 mg) by mouth every 6 hours as needed for dizziness    Vertigo, Nausea       MULTIVITAMIN ADULT PO           OMEGA-3 FISH OIL PO           omeprazole 20 MG CR capsule    priLOSEC    90 capsule    TAKE ONE CAPSULE BY MOUTH ONCE DAILY    Gastroesophageal reflux disease without esophagitis       zolpidem 10 MG tablet    AMBIEN    30 tablet    Take 1 tablet (10 mg) by mouth nightly as needed for sleep    Primary insomnia

## 2018-10-12 ASSESSMENT — PATIENT HEALTH QUESTIONNAIRE - PHQ9: SUM OF ALL RESPONSES TO PHQ QUESTIONS 1-9: 1

## 2018-10-12 ASSESSMENT — ANXIETY QUESTIONNAIRES: GAD7 TOTAL SCORE: 2

## 2018-10-17 ENCOUNTER — RADIANT APPOINTMENT (OUTPATIENT)
Dept: MAMMOGRAPHY | Facility: CLINIC | Age: 53
End: 2018-10-17
Attending: PHYSICIAN ASSISTANT
Payer: COMMERCIAL

## 2018-10-17 ENCOUNTER — RADIANT APPOINTMENT (OUTPATIENT)
Dept: ULTRASOUND IMAGING | Facility: CLINIC | Age: 53
End: 2018-10-17
Attending: PHYSICIAN ASSISTANT
Payer: COMMERCIAL

## 2018-10-17 DIAGNOSIS — N64.4 MASTALGIA: ICD-10-CM

## 2018-10-17 PROCEDURE — 76642 ULTRASOUND BREAST LIMITED: CPT | Mod: RT

## 2018-10-17 PROCEDURE — G0279 TOMOSYNTHESIS, MAMMO: HCPCS

## 2018-10-17 PROCEDURE — 77065 DX MAMMO INCL CAD UNI: CPT

## 2018-11-05 NOTE — PROGRESS NOTES
"  SUBJECTIVE:                                                    Benita Benz is a 53 year old female who presents to clinic today for the following health issues:    Thumb Pain    Onset: x 3 years on and off worst the past few weeks    Description:   Location: R thumb  Character: Sharp, Dull ache and Stabbing    Intensity: moderate    Progression of Symptoms: same    Accompanying Signs & Symptoms:  Other symptoms: numbness    History:   Previous similar pain: YES      Precipitating factors:   Trauma or overuse: YES- unsure per pt    Alleviating factors:  Improved by: rest/inactivity, heat and ice    Therapies Tried and outcome: Noted above and does not help with SX.     Patient reports a skin lesion that worries her \"could be cancer\" per patient. She is not sure whether she injured it or not but does use that hand with a can opener a lot and possibly bumps it frequently she states. No drainage or itching but has a \"dry spot\" in the area of her pain that she wants to get checked out. NO JOINT PAIN OR PAIN WITH ACTIVITY.  No edema. No fevers. No change in range of motion of thumb.  Dull pain worse with bumping or skin contact to the area of concern.  No known h/o ezema. She tried antibiotic ointment on it, did not help.           Problem list and histories reviewed & adjusted, as indicated.  Additional history: as documented    Patient Active Problem List   Diagnosis     Benign essential hypertension     Mild intermittent asthma without complication     Primary insomnia     Dysmenorrhea     Episodic tension-type headache, not intractable     Past Surgical History:   Procedure Laterality Date     EYE SURGERY  2014    toe surgery       Social History   Substance Use Topics     Smoking status: Former Smoker     Smokeless tobacco: Never Used     Alcohol use Yes      Comment: rare     Family History   Problem Relation Age of Onset     Diabetes Mother      Thyroid Disease Mother      Sarcoidosis Mother          Current " Outpatient Prescriptions   Medication Sig Dispense Refill     albuterol (PROAIR HFA, PROVENTIL HFA, VENTOLIN HFA) 108 (90 BASE) MCG/ACT inhaler Inhale 2 puffs into the lungs every 6 hours as needed for shortness of breath / dyspnea or wheezing 1 Inhaler 3     amLODIPine (NORVASC) 10 MG tablet Take 1 tablet (10 mg) by mouth daily 90 tablet 1     Flaxseed, Linseed, (FLAX SEED OIL) 1000 MG capsule Take 1 capsule by mouth daily       HYDROcodone-acetaminophen (NORCO) 5-325 MG per tablet Take 1 tablet by mouth every 8 hours as needed for moderate to severe pain 20 tablet 0     lisinopril (PRINIVIL/ZESTRIL) 40 MG tablet Take 1 tablet (40 mg) by mouth daily 90 tablet 1     meclizine (ANTIVERT) 25 MG tablet Take 1 tablet (25 mg) by mouth every 6 hours as needed for dizziness 30 tablet 1     Multiple Vitamins-Minerals (MULTIVITAMIN ADULT PO)        Omega-3 Fatty Acids (OMEGA-3 FISH OIL PO)        omeprazole (PRILOSEC) 20 MG CR capsule TAKE ONE CAPSULE BY MOUTH ONCE DAILY 90 capsule 1     triamcinolone (KENALOG) 0.1 % cream Apply sparingly to affected area three times daily as needed for up to 14 days 45 g 0     zolpidem (AMBIEN) 10 MG tablet Take 1 tablet (10 mg) by mouth nightly as needed for sleep 30 tablet 5     Allergies   Allergen Reactions     Hmg-Coa-R Inhibitors Other (See Comments)     Numbness is legs       ROS:  Constitutional, HEENT, cardiovascular, pulmonary, GI, , musculoskeletal, neuro, skin, endocrine and psych systems are negative, except as otherwise noted.    OBJECTIVE:     /81  Pulse 84  Temp 98.5  F (36.9  C) (Oral)  Resp 16  Wt 194 lb (88 kg)  SpO2 99%  Breastfeeding? No  BMI 30.84 kg/m2  Body mass index is 30.84 kg/(m^2).  GENERAL: alert and no distress  RESP: lungs clear to auscultation - no rales, rhonchi or wheezes  CV: regular rate and rhythm, normal S1 S2, no S3 or S4, no murmur, click or rub, no peripheral edema and peripheral pulses strong  MS: no gross musculoskeletal defects  noted, no edema  SKIN: {:R thumb IP dorsal aspect of finger skin there is a small fissure present that she reports is tender.  ? Mild white scale overlying the area. No warmth or edema. I am unsure what this is. Could be hand eczema or just a old healed scar.   PSYCH: mentation appears normal, affect normal/bright        ASSESSMENT/PLAN:     ASSESSMENT / PLAN:  (M79.644) Thumb pain, right  (primary encounter diagnosis)  Comment:   Plan: triamcinolone (KENALOG) 0.1 % cream            (L98.9) Thumb lesion  Comment:   Plan: DERMATOLOGY REFERRAL, triamcinolone (KENALOG)         0.1 % cream        \  See below    Patient Instructions   Use prescription, if this is inflammatory or dermatitis related this will improve your symptoms  If no improvement then schedule follow up with dermatology  I don't think this is related to arthritis as it is not in the joint          Stella Santiago PA-C  [facility]

## 2018-11-06 ENCOUNTER — OFFICE VISIT (OUTPATIENT)
Dept: FAMILY MEDICINE | Facility: CLINIC | Age: 53
End: 2018-11-06
Payer: COMMERCIAL

## 2018-11-06 VITALS
WEIGHT: 194 LBS | SYSTOLIC BLOOD PRESSURE: 127 MMHG | DIASTOLIC BLOOD PRESSURE: 81 MMHG | BODY MASS INDEX: 30.84 KG/M2 | RESPIRATION RATE: 16 BRPM | TEMPERATURE: 98.5 F | OXYGEN SATURATION: 99 % | HEART RATE: 84 BPM

## 2018-11-06 DIAGNOSIS — M79.644 THUMB PAIN, RIGHT: Primary | ICD-10-CM

## 2018-11-06 DIAGNOSIS — L98.9 THUMB LESION: ICD-10-CM

## 2018-11-06 PROCEDURE — 99213 OFFICE O/P EST LOW 20 MIN: CPT | Performed by: PHYSICIAN ASSISTANT

## 2018-11-06 RX ORDER — TRIAMCINOLONE ACETONIDE 1 MG/G
CREAM TOPICAL
Qty: 45 G | Refills: 0 | Status: SHIPPED | OUTPATIENT
Start: 2018-11-06

## 2018-11-06 NOTE — NURSING NOTE
"Chief Complaint   Patient presents with     Thumb Discomfort     Sore R thumb per pt x 3 years on and off worst the past few weeks no known injury     Health Maintenance     orders pended       Initial /81  Pulse 84  Temp 98.5  F (36.9  C) (Oral)  Resp 16  Wt 194 lb (88 kg)  SpO2 99%  Breastfeeding? No  BMI 30.84 kg/m2 Estimated body mass index is 30.84 kg/(m^2) as calculated from the following:    Height as of 8/10/18: 5' 6.5\" (1.689 m).    Weight as of this encounter: 194 lb (88 kg).  Medication Reconciliation: complete      Aquiles Kwan MA    "

## 2018-11-06 NOTE — MR AVS SNAPSHOT
After Visit Summary   11/6/2018    Benita Benz    MRN: [id number]           Patient Information     Date Of Birth          1965        Visit Information        Provider Department      11/6/2018 11:20 AM Stella Santiago PA-C [facility]        Today's Diagnoses     Thumb pain, right    -  1    Thumb lesion          Care Instructions    Use prescription, if this is inflammatory or dermatitis related this will improve your symptoms  If no improvement then schedule follow up with dermatology  I don't think this is related to arthritis as it is not in the joint            Follow-ups after your visit        Additional Services     DERMATOLOGY REFERRAL       Your provider has referred you to: [facility]: [facility] [phone]   [email or web address]    Please be aware that coverage of these services is subject to the terms and limitations of your health insurance plan.  Call member services at your health plan with any benefit or coverage questions.      Please bring the following with you to your appointment:    (1) Any X-Rays, CTs or MRIs which have been performed.  Contact the facility where they were done to arrange for  prior to your scheduled appointment.    (2) List of current medications  (3) This referral request   (4) Any documents/labs given to you for this referral                  Your next 10 appointments already scheduled     Nov 14, 2018 11:00 AM CST   New Visit with Amos Arreola DPM   [facility]    [address] [place] [id number] [phone]              Who to contact     If you have questions or need follow up information about today's clinic visit or your schedule please contact [facility] directly at [phone].  Normal or non-critical lab and imaging results will be communicated  to you by MyChart, letter or phone within 4 business days after the clinic has received the results. If you do not hear from us within 7 days, please contact the clinic through MyChart or phone. If you have a critical or abnormal lab result, we will notify you by phone as soon as possible.  Submit refill requests through [employer] or call your pharmacy and they will forward the refill request to us. Please allow 3 business days for your refill to be completed.          Additional Information About Your Visit        Care EveryWhere ID     This is your Care EveryWhere ID. This could be used by other organizations to access your Mobile medical records  [id number]        Your Vitals Were     Pulse Temperature Respirations Pulse Oximetry Breastfeeding? BMI (Body Mass Index)    84 98.5  F (36.9  C) (Oral) 16 99% No 30.84 kg/m2       Blood Pressure from Last 3 Encounters:   11/06/18 127/81   10/11/18 129/79   08/10/18 100/70    Weight from Last 3 Encounters:   11/06/18 194 lb (88 kg)   10/11/18 193 lb (87.5 kg)   08/10/18 194 lb (88 kg)              We Performed the Following     DERMATOLOGY REFERRAL          Today's Medication Changes          These changes are accurate as of 11/6/18 11:39 AM.  If you have any questions, ask your nurse or doctor.               Start taking these medicines.        Dose/Directions    triamcinolone 0.1 % cream   Commonly known as:  KENALOG   Used for:  Thumb pain, right, Thumb lesion   Started by:  Stella Santiago PA-C        Apply sparingly to affected area three times daily as needed for up to 14 days   Quantity:  45 g   Refills:  0            Where to get your medicines      These medications were sent to [facility]  [place], [place] - [address] [employer] [address] [facility]  [address] [facility] [address]     Phone:  [phone]     triamcinolone 0.1 % cream                Primary Care Provider Office Phone # Fax #    [facility] [phone]        [address] [facility] [address]        Equal Access to Services     WALLY ROTHMAN : Hadii gerson kauffman rachell Bolivar, wamarkosda luqdanni, qanicoleta kastephane ovidiojosejulia, lj maejaquelindorys spicer. So [facility] [phone].    ATENCIÓN: Si habla español, tiene a reyes disposición servicios gratuitos de asistencia lingüística. [facility] [phone].    We comply with applicable federal civil rights laws and [place] laws. We do not discriminate on the basis of race, color, national origin, age, disability, sex, sexual orientation, or gender identity.            Thank you!     Thank you for choosing [facility]  for your care. Our goal is always to provide you with excellent care. Hearing back from our patients is one way we can continue to improve our services. Please take a few minutes to complete the written survey that you may receive in the mail after your visit with us. Thank you!             Your Updated Medication List - Protect others around you: Learn how to safely use, store and throw away your medicines at www.disposemymeds.org.          This list is accurate as of 11/6/18 11:39 AM.  Always use your most recent med list.                   Brand Name Dispense Instructions for use Diagnosis    albuterol 108 (90 Base) MCG/ACT inhaler    PROAIR HFA/PROVENTIL HFA/VENTOLIN HFA    1 Inhaler    Inhale 2 puffs into the lungs every 6 hours as needed for shortness of breath / dyspnea or wheezing    Mild intermittent asthma without complication       amLODIPine 10 MG tablet    NORVASC    90 tablet    Take 1 tablet (10 mg) by mouth daily    Benign essential hypertension       flax seed oil 1000 MG capsule      Take 1 capsule by mouth daily        HYDROcodone-acetaminophen 5-325 MG per tablet    NORCO    20 tablet    Take 1 tablet by mouth every 8 hours as needed for moderate to severe pain    Encounter for routine adult health examination with abnormal findings       lisinopril 40 MG tablet     PRINIVIL/ZESTRIL    90 tablet    Take 1 tablet (40 mg) by mouth daily    Benign essential hypertension       meclizine 25 MG tablet    ANTIVERT    30 tablet    Take 1 tablet (25 mg) by mouth every 6 hours as needed for dizziness    Vertigo, Nausea       MULTIVITAMIN ADULT PO           OMEGA-3 FISH OIL PO           omeprazole 20 MG CR capsule    priLOSEC    90 capsule    TAKE ONE CAPSULE BY MOUTH ONCE DAILY    Gastroesophageal reflux disease without esophagitis       triamcinolone 0.1 % cream    KENALOG    45 g    Apply sparingly to affected area three times daily as needed for up to 14 days    Thumb pain, right, Thumb lesion       zolpidem 10 MG tablet    AMBIEN    30 tablet    Take 1 tablet (10 mg) by mouth nightly as needed for sleep    Primary insomnia

## 2018-11-06 NOTE — PATIENT INSTRUCTIONS
Use prescription, if this is inflammatory or dermatitis related this will improve your symptoms  If no improvement then schedule follow up with dermatology  I don't think this is related to arthritis as it is not in the joint

## 2018-11-28 ENCOUNTER — OFFICE VISIT (OUTPATIENT)
Dept: PODIATRY | Facility: CLINIC | Age: 53
End: 2018-11-28
Payer: COMMERCIAL

## 2018-11-28 VITALS — SYSTOLIC BLOOD PRESSURE: 125 MMHG | HEART RATE: 65 BPM | DIASTOLIC BLOOD PRESSURE: 82 MMHG

## 2018-11-28 DIAGNOSIS — M79.672 PAIN OF LEFT HEEL: ICD-10-CM

## 2018-11-28 DIAGNOSIS — M72.2 PLANTAR FASCIITIS OF LEFT FOOT: Primary | ICD-10-CM

## 2018-11-28 PROCEDURE — 99203 OFFICE O/P NEW LOW 30 MIN: CPT | Performed by: PODIATRIST

## 2018-11-28 ASSESSMENT — PAIN SCALES - GENERAL: PAINLEVEL: WORST PAIN (10)

## 2018-11-28 NOTE — NURSING NOTE
Benita Benz's chief complaint for this visit includes:  Chief Complaint   Patient presents with     Plantar Fascitis     Bilateral feet     PCP: Jane Manning    Referring Provider:  No referring provider defined for this encounter.    /82 (BP Location: Right arm, Patient Position: Sitting, Cuff Size: Adult Regular)  Pulse 65  Worst Pain (10)     Do you need any medication refills at today's visit? no

## 2018-11-28 NOTE — PROGRESS NOTES
Date of Service: 11/28/2018    Chief Complaint:   Chief Complaint   Patient presents with     Plantar Fascitis     Bilateral feet        HPI: Benita is a 53 year old female who presents today for further evaluation of BL plantar fasciitis.  Nature: sharp/dull/aching    Location: BL but L>>R.     Duration: 1 month ago, left started hurting. Right hurt after about 2 weeks.     Onset: gradual. No inciting trauma.     Course: worsening.     Aggravating/alleviating factors: + post-static dyskinesia. Weightbearing aggravates. Rest alleviates.     Previous Treatments: Has been icing during this episode. Got a night splint from a friend that has been helping. During a previous episode about 3 years ago, she had an injection that helped the area. She has had orthotics before, but these were too hard and painful. Gets them OTC now and likes these.       Review of Systems: No n/v/d/f/c/ns/sob/cp    PMH:   Past Medical History:   Diagnosis Date     High cholesterol      Hypertension      Uncomplicated asthma        PSxH:   Past Surgical History:   Procedure Laterality Date     EYE SURGERY  2014    toe surgery       Allergies: Hmg-coa-r inhibitors and Simvastatin    SH:   Social History     Social History     Marital status:      Spouse name: N/A     Number of children: N/A     Years of education: N/A     Occupational History     Not on file.     Social History Main Topics     Smoking status: Former Smoker     Smokeless tobacco: Never Used     Alcohol use Yes      Comment: rare     Drug use: No     Sexual activity: Yes     Partners: Male     Other Topics Concern     Not on file     Social History Narrative       FH:   Family History   Problem Relation Age of Onset     Diabetes Mother      Thyroid Disease Mother      Sarcoidosis Mother        Objective:  Data Unavailable 65 Data Unavailable 125/82 Data Unavailable 0 lbs 0 oz    PT and DP pulses are 2/4 bilaterally. CRT is 3 seconds. Positive pedal hair.   Gross sensation  is intact bilaterally.   Equinus is moderate bilaterally. No pain with active or passive ROM of the ankle, MTJ, 1st ray, or halluces bilaterally,. Pain note with palpation of BL medial attachments of plantar fascias on calcanei with L>>R. Some central cord pain on the left. No pain noted with palpation of BL peroneal, PT, or Achilles tendons BL. With weight bearing, she is collapsing medially at the TN.   Nails normal bilaterally. No open lesions are noted.     Assessment: BL plantar fasciitis L>>R.    Plan:  - Pt seen and evaluated.  - Recommendations: CAM boot for the left foot for 2 weeks. On week 3, transition to a shoe at home but the boot at work. Back to shoe on week 4. PT with ultrasound on the left. Voltaren gel. This was sent to pharmacy. Will likely do well with orthotics made from nickelplast. Will mold these at the next appt. She agrees to this plan.  - See again in 5 weeks.

## 2018-11-28 NOTE — MR AVS SNAPSHOT
After Visit Summary   11/28/2018    Benita Benz    MRN: [id number]           Patient Information     Date Of Birth          1965        Visit Information        Provider Department      11/28/2018 10:40 AM Amos Arreola DPM [facility]        Today's Diagnoses     Plantar fasciitis of left foot    -  1    Pain of left heel           Follow-ups after your visit        Additional Services     PHYSICAL THERAPY REFERRAL (Internal)       Physical Therapy Referral                  Your next 10 appointments already scheduled     Dec 05, 2018  9:20 AM CST   Return Visit with Amos Arreola DPM   [facility]    [id number] [place] [id number] [phone]              Future tests that were ordered for you today     Open Future Orders        Priority Expected Expires Ordered    PHYSICAL THERAPY REFERRAL (Internal) Routine  11/28/2019 11/28/2018            Who to contact     If you have questions or need follow up information about today's clinic visit or your schedule please contact [facility] directly at [phone].  Normal or non-critical lab and imaging results will be communicated to you by [employer], letter or phone within 4 business days after the clinic has received the results. If you do not hear from us within 7 days, please contact the clinic through [employer] or phone. If you have a critical or abnormal lab result, we will notify you by phone as soon as possible.  Submit refill requests through [employer] or call your pharmacy and they will forward the refill request to us. Please allow 3 business days for your refill to be completed.          Additional Information About Your Visit        [employer] Information     [employer] is an electronic gateway that provides easy, online access to your medical records. With [employer]It, you can request a clinic appointment, read your test results,  renew a prescription or communicate with your care team.     To sign up for [employer] visit the website at [email or web address]   You will be asked to enter the access code listed below, as well as some personal information. Please follow the directions to create your username and password.     Your access code is: [email or web address]  Expires: 2019 11:19 AM     Your access code will  in 90 days. If you need help or a new code, please contact your [facility] or call [phone] for assistance.        Care EveryWhere ID     This is your Care EveryWhere ID. This could be used by other organizations to access your [facility] medical records  [id number]        Your Vitals Were     Pulse                   65            Blood Pressure from Last 3 Encounters:   18 125/82   18 127/81   10/11/18 129/79    Weight from Last 3 Encounters:   18 88 kg (194 lb)   10/11/18 87.5 kg (193 lb)   08/10/18 88 kg (194 lb)                 Today's Medication Changes          These changes are accurate as of 18 11:19 AM.  If you have any questions, ask your nurse or doctor.               Start taking these medicines.        Dose/Directions    diclofenac 1 % topical gel   Commonly known as:  VOLTAREN   Used for:  Plantar fasciitis of left foot, Pain of left heel        Apply 2 grams to hands four times daily using enclosed dosing card.   Quantity:  100 g   Refills:  1            Where to get your medicines      These medications were sent to [facility] [address] [facility]  [address] [facility] [address]     Phone:  [phone]     diclofenac 1 % topical gel                Primary Care Provider Office Phone # Fax #    [facility] [phone] [address] [address] [facility] [address]        Equal Access to Services     WALLY ROTHMAN AH: Bossman Bolivar, ida watts, qaclemente morse  lj herrerasonal rose'aan ah. So [facility] [phone].    ATENCIÓN: Si nilamla kelsey, tiene a reyes disposición servicios gratuitos de asistencia lingüística. Jesús al [phone].    We comply with applicable federal civil rights laws and [place] laws. We do not discriminate on the basis of race, color, national origin, age, disability, sex, sexual orientation, or gender identity.            Thank you!     Thank you for choosing [facility]  for your care. Our goal is always to provide you with excellent care. Hearing back from our patients is one way we can continue to improve our services. Please take a few minutes to complete the written survey that you may receive in the mail after your visit with us. Thank you!             Your Updated Medication List - Protect others around you: Learn how to safely use, store and throw away your medicines at www.disposemymeds.org.          This list is accurate as of 11/28/18 11:19 AM.  Always use your most recent med list.                   Brand Name Dispense Instructions for use Diagnosis    albuterol 108 (90 Base) MCG/ACT inhaler    PROAIR HFA/PROVENTIL HFA/VENTOLIN HFA    1 Inhaler    Inhale 2 puffs into the lungs every 6 hours as needed for shortness of breath / dyspnea or wheezing    Mild intermittent asthma without complication       amLODIPine 10 MG tablet    NORVASC    90 tablet    Take 1 tablet (10 mg) by mouth daily    Benign essential hypertension       diclofenac 1 % topical gel    VOLTAREN    100 g    Apply 2 grams to hands four times daily using enclosed dosing card.    Plantar fasciitis of left foot, Pain of left heel       flax seed oil 1000 MG capsule      Take 1 capsule by mouth daily        HYDROcodone-acetaminophen 5-325 MG tablet    NORCO    20 tablet    Take 1 tablet by mouth every 8 hours as needed for moderate to severe pain    Encounter for routine adult health examination with abnormal findings       lisinopril  40 MG tablet    PRINIVIL/ZESTRIL    90 tablet    Take 1 tablet (40 mg) by mouth daily    Benign essential hypertension       meclizine 25 MG tablet    ANTIVERT    30 tablet    Take 1 tablet (25 mg) by mouth every 6 hours as needed for dizziness    Vertigo, Nausea       MULTIVITAMIN ADULT PO           OMEGA-3 FISH OIL PO           omeprazole 20 MG DR capsule    priLOSEC    90 capsule    TAKE ONE CAPSULE BY MOUTH ONCE DAILY    Gastroesophageal reflux disease without esophagitis       triamcinolone 0.1 % external cream    KENALOG    45 g    Apply sparingly to affected area three times daily as needed for up to 14 days    Thumb pain, right, Thumb lesion       zolpidem 10 MG tablet    AMBIEN    30 tablet    Take 1 tablet (10 mg) by mouth nightly as needed for sleep    Primary insomnia

## 2018-11-28 NOTE — LETTER
11/28/2018         RE: Benita Benz  [address] [facility] [address]        Dear Colleague,    Thank you for referring your patient, Benita Benz, to the [facility]. Please see a copy of my visit note below.    Date of Service: 11/28/2018    Chief Complaint:   Chief Complaint   Patient presents with     Plantar Fascitis     Bilateral feet        HPI: Benita is a 53 year old female who presents today for further evaluation of BL plantar fasciitis.  Nature: sharp/dull/aching    Location: BL but L>>R.     Duration: 1 month ago, left started hurting. Right hurt after about 2 weeks.     Onset: gradual. No inciting trauma.     Course: worsening.     Aggravating/alleviating factors: + post-static dyskinesia. Weightbearing aggravates. Rest alleviates.     Previous Treatments: Has been icing during this episode. Got a night splint from a friend that has been helping. During a previous episode about 3 years ago, she had an injection that helped the area. She has had orthotics before, but these were too hard and painful. Gets them OTC now and likes these.       Review of Systems: No n/v/d/f/c/ns/sob/cp    PMH:   Past Medical History:   Diagnosis Date     High cholesterol      Hypertension      Uncomplicated asthma        PSxH:   Past Surgical History:   Procedure Laterality Date     EYE SURGERY  2014    toe surgery       Allergies: Hmg-coa-r inhibitors and Simvastatin    SH:   Social History     Social History     Marital status:      Spouse name: N/A     Number of children: N/A     Years of education: N/A     Occupational History     Not on file.     Social History Main Topics     Smoking status: Former Smoker     Smokeless tobacco: Never Used     Alcohol use Yes      Comment: rare     Drug use: No     Sexual activity: Yes     Partners: Male     Other Topics Concern     Not on file     Social History Narrative       FH:   Family History   Problem Relation Age of Onset     Diabetes  Mother      Thyroid Disease Mother      Sarcoidosis Mother        Objective:  Data Unavailable 65 Data Unavailable 125/82 Data Unavailable 0 lbs 0 oz    PT and DP pulses are 2/4 bilaterally. CRT is 3 seconds. Positive pedal hair.   Gross sensation is intact bilaterally.   Equinus is moderate bilaterally. No pain with active or passive ROM of the ankle, MTJ, 1st ray, or halluces bilaterally,. Pain note with palpation of BL medial attachments of plantar fascias on calcanei with L>>R. Some central cord pain on the left. No pain noted with palpation of BL peroneal, PT, or Achilles tendons BL. With weight bearing, she is collapsing medially at the TN.   Nails normal bilaterally. No open lesions are noted.     Assessment: BL plantar fasciitis L>>R.    Plan:  - Pt seen and evaluated.  - Recommendations: CAM boot for the left foot for 2 weeks. On week 3, transition to a shoe at home but the boot at work. Back to shoe on week 4. PT with ultrasound on the left. Voltaren gel. This was sent to pharmacy. Will likely do well with orthotics made from nickelplast. Will mold these at the next appt. She agrees to this plan.  - See again in 5 weeks.              Again, thank you for allowing me to participate in the care of your patient.        Sincerely,        Amos Arreola DPM

## 2018-11-28 NOTE — LETTER
Verification of Appointment  2018     Seen today: yes    Patient:  Benita Benz  :   1965  MRN:     [id number]  Physician: AMOS ARREOLA    Benita Benz may return to work on Date: 18.          Patient limitations:  Needs to wear boot while working.             Electronically signed by Amos Arreola DPM

## 2018-12-04 ENCOUNTER — THERAPY VISIT (OUTPATIENT)
Dept: PHYSICAL THERAPY | Facility: CLINIC | Age: 53
End: 2018-12-04
Payer: COMMERCIAL

## 2018-12-04 DIAGNOSIS — M72.2 PLANTAR FASCIITIS OF LEFT FOOT: ICD-10-CM

## 2018-12-04 DIAGNOSIS — M79.672 PAIN OF LEFT HEEL: ICD-10-CM

## 2018-12-04 PROCEDURE — 97140 MANUAL THERAPY 1/> REGIONS: CPT | Mod: GP | Performed by: PHYSICAL THERAPIST

## 2018-12-04 PROCEDURE — 97110 THERAPEUTIC EXERCISES: CPT | Mod: GP | Performed by: PHYSICAL THERAPIST

## 2018-12-04 PROCEDURE — 97161 PT EVAL LOW COMPLEX 20 MIN: CPT | Mod: GP | Performed by: PHYSICAL THERAPIST

## 2018-12-04 NOTE — PROGRESS NOTES
[facility] Initial Evaluation  Subjective:  Patient is a 53 year old female presenting with rehab left ankle/foot hpi. The history is provided by the patient. No  was used.   Benita Benz is a 53 year old female with a bilateral ankles (L>R) condition.  Condition occurred with:  Repetition/overuse.  Condition occurred: for unknown reasons.  This is a chronic condition  Has had chronic foot pain.  Has been wearing OTC orthotics bu MD suggested custom orthotics.  Is wearing a CAM boot for when she is standing on her feet longer periods..    Patient reports pain:  Longitudinal arch, posterior and medial calcaneal tuberosity.  Radiates to:  Foot.  Pain is described as aching, cramping and sharp and is constant and reported as 5/10.   Pain is the same all the time.  Symptoms are exacerbated by ascending stairs, walking, descending stairs and standing and relieved by bracing/immobilizing and other (Cream as needed).  Since onset symptoms are unchanged.        General health as reported by patient is good.  Pertinent medical history includes:  Asthma, high blood pressure, numbness/tingling, overweight and sleep disorder/apnea.    Other surgeries include:  None reported (2010 and 2014 bone shaving).  Current medications:  Anti-inflammatory, high blood pressure medication, pain medication and sleep medication.  Current occupation is CNA.  Patient is working in normal job without restrictions.      Barriers include:  None as reported by patient.    Red flags:  None as reported by patient.                        Objective:  System    Ankle/Foot Evaluation  ROM:    AROM:    Dorsiflexion:  Left:   5  Right:   8  Plantarflexion:  Left:  65    Right:  65  Inversion:  Left:  50     Right:  50  Eversion:  7     Right:  23        Strength:    Dorsiflexion:  Left: 5-/5     Pain:   Right: 5/5   Pain:  Plantarflexion: Left: 5/5   Pain:   Right: 5/5  Pain:  Inversion:Left: 4/5  Pain:     Right:  5/5  Pain:  Eversion:Left: 4-/5  Pain:  Right: 5/5  Pain:                      PALPATION: Palpation of ankle: L>R pain.  Left ankle tenderness present at:  medial calcaneal  Right ankle tenderness present at:   medial calcaneal                                                        General     ROS    Assessment/Plan:    Patient is a 53 year old female with both sides ankle complaints.    Patient has the following significant findings with corresponding treatment plan.                Diagnosis 1:  Bilateral foot pain  Pain -  hot/cold therapy, US, manual therapy, self management, education and directional preference exercise  Decreased ROM/flexibility - manual therapy and therapeutic exercise  Decreased strength - therapeutic exercise and therapeutic activities  Impaired muscle performance - neuro re-education  Decreased function - therapeutic activities    Therapy Evaluation Codes:   1) History comprised of:   Personal factors that impact the plan of care:      None.    Comorbidity factors that impact the plan of care are:      None.     Medications impacting care: Anti-inflammatory and Muscle relaxant.  2) Examination of Body Systems comprised of:   Body structures and functions that impact the plan of care:      Ankle.   Activity limitations that impact the plan of care are:      Standing.  3) Clinical presentation characteristics are:   Stable/Uncomplicated.  4) Decision-Making    Low complexity using standardized patient assessment instrument and/or measureable assessment of functional outcome.  Cumulative Therapy Evaluation is: Low complexity.    Previous and current functional limitations:  (See Goal Flow Sheet for this information)    Short term and Long term goals: (See Goal Flow Sheet for this information)     Communication ability:  Patient appears to be able to clearly communicate and understand verbal and written communication and follow directions correctly.  Treatment Explanation - The following has  been discussed with the patient:   RX ordered/plan of care  Anticipated outcomes  Possible risks and side effects  This patient would benefit from PT intervention to resume normal activities.   Rehab potential is good.    Frequency:  1 X week, once daily  Duration:  for 8 weeks  Discharge Plan:  Achieve all LTG.  Independent in home treatment program.  Reach maximal therapeutic benefit.    Please refer to the daily flowsheet for treatment today, total treatment time and time spent performing 1:1 timed codes.

## 2018-12-04 NOTE — MR AVS SNAPSHOT
"              After Visit Summary   12/4/2018    Benita Benz    MRN: [id number]           Patient Information     Date Of Birth          1965        Visit Information        Provider Department      12/4/2018 8:20 AM Morenita Fritz, PT [facility]        Today's Diagnoses     Plantar fasciitis of left foot        Pain of left heel           Follow-ups after your visit        Your next 10 appointments already scheduled     Dec 05, 2018  9:20 AM CST   Return Visit with Amos Arreola DPM   [facility] ([facility])    [address] [place] [id number] [phone]            Dec 12, 2018  9:00 AM CST   CORY Extremity with Tess Durant, PT   [facility] ([facility]  )    [address] [place] [address] [phone]              Who to contact     If you have questions or need follow up information about today's clinic visit or your schedule please contact [facility] directly at [phone].  Normal or non-critical lab and imaging results will be communicated to you by MyChart, letter or phone within 4 business days after the clinic has received the results. If you do not hear from us within 7 days, please contact the clinic through qcuehart or phone. If you have a critical or abnormal lab result, we will notify you by phone as soon as possible.  Submit refill requests through Chinese Online or call your pharmacy and they will forward the refill request to us. Please allow 3 business days for your refill to be completed.          Additional Information About Your Visit        MyChart Information     Chinese Online lets you send messages to your doctor, view your test results, renew your prescriptions, schedule appointments and more. To sign up, go to [email or web address] . Click on \"Log in\" on the left side of the screen, which will " "take you to the Welcome page. Then click on \"Sign up Now\" on the right side of the page.     You will be asked to enter the access code listed below, as well as some personal information. Please follow the directions to create your username and password.     Your access code is: [email or web address]  Expires: 2019 11:19 AM     Your access code will  in 90 days. If you need help or a new code, please call your [facility] clinic or [phone].        Care EveryWhere ID     This is your Care EveryWhere ID. This could be used by other organizations to access your [facility] medical records  [id number]         Blood Pressure from Last 3 Encounters:   18 125/82   18 127/81   10/11/18 129/79    Weight from Last 3 Encounters:   18 88 kg (194 lb)   10/11/18 87.5 kg (193 lb)   08/10/18 88 kg (194 lb)              We Performed the Following     HC PT EVAL, LOW COMPLEXITY     CORY INITIAL EVAL REPORT     MANUAL THER TECH,1+REGIONS,EA 15 MIN     PHYSICAL THERAPY REFERRAL (Internal)     THERAPEUTIC EXERCISES        Primary Care Provider Office Phone # Fax #    [facility] [phone] [address] [facility] [address]        Equal Access to Services     WALLY ROTHMAN : Hadii aad ku hadasho Soomaali, waaxda luqadaha, qaybta kaalmada adeegyada, lj monteroin hayadrin suzie spicer. So [facility] [phone].    ATENCIÓN: Si habla español, tiene a reyes disposición servicios gratuitos de asistencia lingüística. Llame al [phone].    We comply with applicable federal civil rights laws and [place] laws. We do not discriminate on the basis of race, color, national origin, age, disability, sex, sexual orientation, or gender identity.            Thank you!     Thank you for choosing [facility]  for your care. Our goal is always to provide you with excellent care. Hearing back from our patients is one way we can continue to improve our services. " Please take a few minutes to complete the written survey that you may receive in the mail after your visit with us. Thank you!             Your Updated Medication List - Protect others around you: Learn how to safely use, store and throw away your medicines at www.disposemymeds.org.          This list is accurate as of 12/4/18  9:24 AM.  Always use your most recent med list.                   Brand Name Dispense Instructions for use Diagnosis    albuterol 108 (90 Base) MCG/ACT inhaler    PROAIR HFA/PROVENTIL HFA/VENTOLIN HFA    1 Inhaler    Inhale 2 puffs into the lungs every 6 hours as needed for shortness of breath / dyspnea or wheezing    Mild intermittent asthma without complication       amLODIPine 10 MG tablet    NORVASC    90 tablet    Take 1 tablet (10 mg) by mouth daily    Benign essential hypertension       diclofenac 1 % topical gel    VOLTAREN    100 g    Apply 2 grams to hands four times daily using enclosed dosing card.    Plantar fasciitis of left foot, Pain of left heel       flax seed oil 1000 MG capsule      Take 1 capsule by mouth daily        HYDROcodone-acetaminophen 5-325 MG tablet    NORCO    20 tablet    Take 1 tablet by mouth every 8 hours as needed for moderate to severe pain    Encounter for routine adult health examination with abnormal findings       lisinopril 40 MG tablet    PRINIVIL/ZESTRIL    90 tablet    Take 1 tablet (40 mg) by mouth daily    Benign essential hypertension       meclizine 25 MG tablet    ANTIVERT    30 tablet    Take 1 tablet (25 mg) by mouth every 6 hours as needed for dizziness    Vertigo, Nausea       MULTIVITAMIN ADULT PO           OMEGA-3 FISH OIL PO           omeprazole 20 MG DR capsule    priLOSEC    90 capsule    TAKE ONE CAPSULE BY MOUTH ONCE DAILY    Gastroesophageal reflux disease without esophagitis       order for DME     1 Device    Equipment being ordered: [id number] M$254  Walking Boot, [employer], MED    Plantar fasciitis of left foot, Pain of left  heel       triamcinolone 0.1 % external cream    KENALOG    45 g    Apply sparingly to affected area three times daily as needed for up to 14 days    Thumb pain, right, Thumb lesion       zolpidem 10 MG tablet    AMBIEN    30 tablet    Take 1 tablet (10 mg) by mouth nightly as needed for sleep    Primary insomnia

## 2018-12-05 ENCOUNTER — OFFICE VISIT (OUTPATIENT)
Dept: PODIATRY | Facility: CLINIC | Age: 53
End: 2018-12-05
Payer: COMMERCIAL

## 2018-12-05 VITALS — SYSTOLIC BLOOD PRESSURE: 123 MMHG | HEART RATE: 74 BPM | OXYGEN SATURATION: 99 % | DIASTOLIC BLOOD PRESSURE: 78 MMHG

## 2018-12-05 DIAGNOSIS — M72.2 PLANTAR FASCIITIS OF LEFT FOOT: ICD-10-CM

## 2018-12-05 DIAGNOSIS — M79.672 PAIN OF LEFT HEEL: Primary | ICD-10-CM

## 2018-12-05 PROCEDURE — 99212 OFFICE O/P EST SF 10 MIN: CPT | Performed by: PODIATRIST

## 2018-12-05 ASSESSMENT — PAIN SCALES - GENERAL: PAINLEVEL: MODERATE PAIN (5)

## 2018-12-05 NOTE — LETTER
Return to Work  2018     Seen today: yes    Patient:  Benita Benz  :   1965  MRN:     [id number]  Physician: JASMINE ARREOLA    Benita Noblebernabe may return to work on Date: 18.      .    Patient limitations:  Needs to sit every 2 hours because pain              Electronically signed by Jasmine Arreola DPM

## 2018-12-05 NOTE — LETTER
12/5/2018         RE: Benita Benz  [address] [facility] [address]        Dear Colleague,    Thank you for referring your patient, Benita Benz, to the [facility]. Please see a copy of my visit note below.    Chief Complaint:   Chief Complaint   Patient presents with     Left Foot - Plantar Fascitis     Right Foot - Plantar Fascitis     RECHECK          Allergies   Allergen Reactions     Hmg-Coa-R Inhibitors Other (See Comments)     Numbness is legs     Simvastatin      Periferal neuropathy         Subjective: Benita is a 53 year old female who presents to the clinic today for a follow up of BL plantar fasciitis. She relates that she would like a work note to sit every 2 hours. Is wearing the CAM at work. Would like orthotics molded today.     Objective  Data Unavailable 74 Data Unavailable 123/78 Data Unavailable 0 lbs 0 oz  PE is unchanged since the last visit.     Assessment: BL plantar fasciitis with L>R.     Plan:   - Pt seen and evaluated  - Letter written for work.  - Orthotics were molded for her.   - Pt to return to clinic in 1 month.       Again, thank you for allowing me to participate in the care of your patient.        Sincerely,        Amos Arreola DPM

## 2018-12-05 NOTE — PATIENT INSTRUCTIONS
Thanks for coming today.  Ortho/Sports Medicine Clinic  [address] [place] [address]    To schedule future appointments in Ortho Clinic, you may call [phone].    To schedule ordered imaging by your provider:   Call Central Imaging [phone]    To schedule an injection ordered by your provider:  Call Central Imaging Injection scheduling line: [phone]  Zipit Wirelesshart available online at:  [email or web address]    Please call if any further questions or concerns ([phone]).  Clinic hours 8 am to 5 pm.    Return to clinic (call) if symptoms worsen or fail to improve.

## 2018-12-05 NOTE — MR AVS SNAPSHOT
After Visit Summary   2018    Benita Benz    MRN: [id number]           Patient Information     Date Of Birth          1965        Visit Information        Provider Department      2018 9:20 AM Amos Arreola DPM [facility]        Today's Diagnoses     Pain of left heel    -  1    Plantar fasciitis of left foot          Care Instructions    Thanks for coming today.  Ortho[facility] Medicine Clinic  [address] [place] [address]    To schedule future appointments in Ortho Clinic, you may call [phone].    To schedule ordered imaging by your provider:   Call Central Imaging [phone]    To schedule an injection ordered by your provider:  Call Central Imaging Injection scheduling line: [phone]  cWyze available online at:  [email or web address]    Please call if any further questions or concerns ([phone]).  Clinic hours 8 am to 5 pm.    Return to clinic (call) if symptoms worsen or fail to improve.            Follow-ups after your visit        Additional Services     ORTHOTICS REFERRAL       **This referral order prints off in the [facility]  (Orthotics & Prosthetics) Central Scheduling Office**    The [facility] Central Scheduling Staff will contact the patient to schedule appointments.     Central Scheduling Contact Information: [phone] ([place])    Orthotics: Foot Orthotics    Please be aware that coverage of these services is subject to the terms and limitations of your health insurance plan.  Call member services at your health plan with any benefit or coverage questions.      Please bring the following to your appointment:    >>   Any x-rays, CTs or MRIs which have been performed.  Contact the facility where they were done to arrange for  prior to your scheduled appointment.    >>   List of current medications   >>   This referral request   >>   Any documents/labs given to  you for this referral                  Your next 10 appointments already scheduled     Dec 12, 2018  9:00 AM CST   CORY Extremity with Tess Durant, PT   [facility] ([facility]  )    [address]  [facility] [address] [phone]              Who to contact     If you have questions or need follow up information about today's clinic visit or your schedule please contact [facility] directly at [phone].  Normal or non-critical lab and imaging results will be communicated to you by Inviragenhart, letter or phone within 4 business days after the clinic has received the results. If you do not hear from us within 7 days, please contact the clinic through Inviragenhart or phone. If you have a critical or abnormal lab result, we will notify you by phone as soon as possible.  Submit refill requests through Ludesi or call your pharmacy and they will forward the refill request to us. Please allow 3 business days for your refill to be completed.          Additional Information About Your Visit        Ludesi Information     Ludesi is an electronic gateway that provides easy, online access to your medical records. With Ludesi, you can request a clinic appointment, read your test results, renew a prescription or communicate with your care team.     To sign up for Ludesi visit the website at [email or web address]   You will be asked to enter the access code listed below, as well as some personal information. Please follow the directions to create your username and password.     Your access code is: [email or web address]  Expires: 2019 11:19 AM     Your access code will  in 90 days. If you need help or a new code, please contact your [facility] or call [phone] for assistance.        Care EveryWhere ID     This is your Care EveryWhere ID. This could be used by other organizations to access your [facility]  records  [id number]        Your Vitals Were     Pulse Pulse Oximetry                74 99%           Blood Pressure from Last 3 Encounters:   12/05/18 123/78   11/28/18 125/82   11/06/18 127/81    Weight from Last 3 Encounters:   11/06/18 88 kg (194 lb)   10/11/18 87.5 kg (193 lb)   08/10/18 88 kg (194 lb)              We Performed the Following     ORTHOTICS REFERRAL        Primary Care Provider Office Phone # Fax #    [facility] [phone] [address] [facility] [address]        Equal Access to Services     [facility] HospitalDEIDRE : Hadii aad ku hadasho Soomaali, waaxda luqadaha, qaybta kaalmada adeegyada, lj mejia hayhernan hinson . So [facility] [phone].    ATENCIÓN: Si habla español, tiene a reyes disposición servicios gratuitos de asistencia lingüística. Llame al [phone].    We comply with applicable federal civil rights laws and [place] laws. We do not discriminate on the basis of race, color, national origin, age, disability, sex, sexual orientation, or gender identity.            Thank you!     Thank you for choosing [facility]  for your care. Our goal is always to provide you with excellent care. Hearing back from our patients is one way we can continue to improve our services. Please take a few minutes to complete the written survey that you may receive in the mail after your visit with us. Thank you!             Your Updated Medication List - Protect others around you: Learn how to safely use, store and throw away your medicines at www.disposemymeds.org.          This list is accurate as of 12/5/18  9:44 AM.  Always use your most recent med list.                   Brand Name Dispense Instructions for use Diagnosis    albuterol 108 (90 Base) MCG/ACT inhaler    PROAIR HFA/PROVENTIL HFA/VENTOLIN HFA    1 Inhaler    Inhale 2 puffs into the lungs every 6 hours as needed for shortness of breath / dyspnea or wheezing    Mild intermittent asthma  without complication       amLODIPine 10 MG tablet    NORVASC    90 tablet    Take 1 tablet (10 mg) by mouth daily    Benign essential hypertension       diclofenac 1 % topical gel    VOLTAREN    100 g    Apply 2 grams to hands four times daily using enclosed dosing card.    Plantar fasciitis of left foot, Pain of left heel       flax seed oil 1000 MG capsule      Take 1 capsule by mouth daily        HYDROcodone-acetaminophen 5-325 MG tablet    NORCO    20 tablet    Take 1 tablet by mouth every 8 hours as needed for moderate to severe pain    Encounter for routine adult health examination with abnormal findings       lisinopril 40 MG tablet    PRINIVIL/ZESTRIL    90 tablet    Take 1 tablet (40 mg) by mouth daily    Benign essential hypertension       meclizine 25 MG tablet    ANTIVERT    30 tablet    Take 1 tablet (25 mg) by mouth every 6 hours as needed for dizziness    Vertigo, Nausea       MULTIVITAMIN ADULT PO           OMEGA-3 FISH OIL PO           omeprazole 20 MG DR capsule    priLOSEC    90 capsule    TAKE ONE CAPSULE BY MOUTH ONCE DAILY    Gastroesophageal reflux disease without esophagitis       order for DME     1 Device    Equipment being ordered: [id number] M$254  Walking Boot, [employer], MED    Plantar fasciitis of left foot, Pain of left heel       triamcinolone 0.1 % external cream    KENALOG    45 g    Apply sparingly to affected area three times daily as needed for up to 14 days    Thumb pain, right, Thumb lesion       zolpidem 10 MG tablet    AMBIEN    30 tablet    Take 1 tablet (10 mg) by mouth nightly as needed for sleep    Primary insomnia

## 2018-12-05 NOTE — NURSING NOTE
Benita Benz's goals for this visit include:   Chief Complaint   Patient presents with     Left Foot - Plantar Fascitis     Right Foot - Plantar Fascitis     RECHECK       She requests these members of her care team be copied on today's visit information: PCP    PCP: Jane Manning    Referring Provider:  No referring provider defined for this encounter.    /78 (BP Location: Right arm, Patient Position: Chair, Cuff Size: Adult Large)  Pulse 74  SpO2 99%    Do you need any medication refills at today's visit? None      Maribel Caal CMA

## 2018-12-05 NOTE — PROGRESS NOTES
Chief Complaint:   Chief Complaint   Patient presents with     Left Foot - Plantar Fascitis     Right Foot - Plantar Fascitis     RECHECK          Allergies   Allergen Reactions     Hmg-Coa-R Inhibitors Other (See Comments)     Numbness is legs     Simvastatin      Periferal neuropathy         Subjective: Benita is a 53 year old female who presents to the clinic today for a follow up of BL plantar fasciitis. She relates that she would like a work note to sit every 2 hours. Is wearing the CAM at work. Would like orthotics molded today.     Objective  Data Unavailable 74 Data Unavailable 123/78 Data Unavailable 0 lbs 0 oz  PE is unchanged since the last visit.     Assessment: BL plantar fasciitis with L>R.     Plan:   - Pt seen and evaluated  - Letter written for work.  - Orthotics were molded for her.   - Pt to return to clinic in 1 month.

## 2018-12-10 ENCOUNTER — TELEPHONE (OUTPATIENT)
Dept: PODIATRY | Facility: CLINIC | Age: 53
End: 2018-12-10

## 2018-12-10 NOTE — TELEPHONE ENCOUNTER
[facility] Call Center    Phone Message    May a detailed message be left on voicemail: yes    Reason for Call: Form or Letter   Type or form/letter needing completion: patient is requesting that provider complete a letter stating that patient can return to work without any restrictions  Provider: Dr. Arreola  Date form needed: ASAP  Once completed: Fax form to: [phone] Attn: [facility]      Action Taken: Message routed to:  Adult Clinics: Podiatry p [id number]

## 2018-12-10 NOTE — TELEPHONE ENCOUNTER
Pt called back, she would like the letter to include that she should be wearing a CAM walking boot on her left leg for 2 weeks (until her follow up appt with Dr. Arreola on 12/19/18).     New letter drafted and faxed per Pt request.     Sabino Wagoner RN

## 2018-12-10 NOTE — LETTER
December 10, 2018      RE: Benita Benz  [address] [address] [address]       To whom it may concern:    Benita Benz is under my professional care for her bilateral plantar fasciitis. She may return to work on 12/10/18 while wearing a CAM walking boot on her left foot. She is to wear this until her follow up on 12/19/18. She does not have any other restrictions.      Sincerely,        Amos Arreola DPM

## 2018-12-19 ENCOUNTER — OFFICE VISIT (OUTPATIENT)
Dept: PODIATRY | Facility: CLINIC | Age: 53
End: 2018-12-19
Payer: COMMERCIAL

## 2018-12-19 VITALS — DIASTOLIC BLOOD PRESSURE: 76 MMHG | SYSTOLIC BLOOD PRESSURE: 122 MMHG | HEART RATE: 90 BPM

## 2018-12-19 DIAGNOSIS — M72.2 PLANTAR FASCIITIS OF LEFT FOOT: Primary | ICD-10-CM

## 2018-12-19 PROCEDURE — 20550 NJX 1 TENDON SHEATH/LIGAMENT: CPT | Performed by: PODIATRIST

## 2018-12-19 ASSESSMENT — PAIN SCALES - GENERAL: PAINLEVEL: MODERATE PAIN (5)

## 2018-12-19 NOTE — LETTER
Return to Work  2018     Seen today: yes    Patient:  Benita Benz  :   1965  MRN:     [id number]  Physician: AMOS ARREOLA    Benita Benz may return to work on Date: 18.      The next clinic appointment is scheduled for (date/time) 6 weeks.    Patient limitations:  Can use her regular footwear, however if she needs to go back to the boot, please allow her to do so.         .      Electronically signed by Amos Arreola DPM

## 2018-12-19 NOTE — NURSING NOTE
Benita Benz's chief complaint for this visit includes:  Chief Complaint   Patient presents with     Plantar Fascitis     Left foot     PCP: Kerri, [facility]    Referring Provider:  Referred Self, MD  No address on file    /76 (BP Location: Right arm, Patient Position: Sitting, Cuff Size: Adult Regular)   Pulse 90   Moderate Pain (5)     Do you need any medication refills at today's visit? no

## 2018-12-19 NOTE — LETTER
12/19/2018         RE: Benita Benz  [address] [address] [address]        Dear Colleague,    Thank you for referring your patient, Benita Benz, to the [facility]. Please see a copy of my visit note below.    Chief Complaint:   Chief Complaint   Patient presents with     Plantar Fascitis     Left foot          Allergies   Allergen Reactions     Hmg-Coa-R Inhibitors Other (See Comments)     Numbness is legs     Simvastatin      Periferal neuropathy         Subjective: Benita is a 53 year old female who presents to the clinic today for a follow up of left heel pain. Continues to have pain. Would like an injection today.     Objective  Data Unavailable 90 Data Unavailable 122/76 Data Unavailable 0 lbs 0 oz  Pain noted with palpation of the medial attachment of the plantar fascia on the calcaneus on the left side. No pain laterally and no pain along the courses of the PT, peroneal, or Achilles tendons on the left.     Assessment: Left foot plantar fasciitis.     Plan:   - Pt seen and evaluated  - She would like a steroid injection today. She last had one four years ago. I discussed the risks, complications, benefits, alternatives and answered all her questions. Consent was signed. All questions answered. After skin prep, an injection consisting of 3cc's of a 1:1 mix of 1% lidocaine plain + Kenalog-40 + dexamethasone 4mg was injected into the left heel. She tolerated this well with no complications. Cont PT and boot when needed.   - Pt to return to clinic in 6 weeks.       Again, thank you for allowing me to participate in the care of your patient.        Sincerely,        Amos Arreola DPM

## 2018-12-19 NOTE — PROGRESS NOTES
Chief Complaint:   Chief Complaint   Patient presents with     Plantar Fascitis     Left foot          Allergies   Allergen Reactions     Hmg-Coa-R Inhibitors Other (See Comments)     Numbness is legs     Simvastatin      Periferal neuropathy         Subjective: Benita is a 53 year old female who presents to the clinic today for a follow up of left heel pain. Continues to have pain. Would like an injection today.     Objective  Data Unavailable 90 Data Unavailable 122/76 Data Unavailable 0 lbs 0 oz  Pain noted with palpation of the medial attachment of the plantar fascia on the calcaneus on the left side. No pain laterally and no pain along the courses of the PT, peroneal, or Achilles tendons on the left.     Assessment: Left foot plantar fasciitis.     Plan:   - Pt seen and evaluated  - She would like a steroid injection today. She last had one four years ago. I discussed the risks, complications, benefits, alternatives and answered all her questions. Consent was signed. All questions answered. After skin prep, an injection consisting of 3cc's of a 1:1 mix of 1% lidocaine plain + Kenalog-40 + dexamethasone 4mg was injected into the left heel. She tolerated this well with no complications. Cont PT and boot when needed.   - Pt to return to clinic in 6 weeks.

## 2019-01-04 NOTE — TELEPHONE ENCOUNTER
Faxed note to fax number in the message.Sophie Martino MA/FRANCESCO    
Patient is calling to request a work note stating can return to work with date on it as of : 05/08/18. Please fax: [phone] [facility] ATTN: Erica. Thank you.  
Pended work note, please complete, print and sign it.Sophie Martino MA/FRANCESCO    
no

## 2019-03-20 ENCOUNTER — TELEPHONE (OUTPATIENT)
Dept: FAMILY MEDICINE | Facility: CLINIC | Age: 54
End: 2019-03-20

## 2019-03-20 DIAGNOSIS — J45.20 MILD INTERMITTENT ASTHMA WITHOUT COMPLICATION: ICD-10-CM

## 2019-03-20 RX ORDER — ALBUTEROL SULFATE 90 UG/1
2 AEROSOL, METERED RESPIRATORY (INHALATION) EVERY 6 HOURS PRN
Qty: 18 G | Refills: 0 | Status: SHIPPED | OUTPATIENT
Start: 2019-03-20 | End: 2021-05-25

## 2019-03-20 NOTE — TELEPHONE ENCOUNTER
Patient states she would like a refill on her inhaler as she has a cold and her asthma has flared up.  Please call when/if done.    Thank you.

## 2019-03-20 NOTE — TELEPHONE ENCOUNTER
Left message on pt vm that one refill was sent to pharmacy.  Advised if productive cough or worsening sx then needs ov.  Advised we need ACT updated and  would send it to her in the mail. Please complete and return.  Miriam CASHN, RN

## 2019-03-20 NOTE — LETTER
March 20, 2019      Benita Benz  [address] [address] [facility] [address]      Dear Benita,     Your clinic record indicates that you are due for an asthma update. We have a survey tool called an ACT (or Asthma Control Test) we use to measure the level of control of your asthma. Please complete the enclosed questionnaire and mail it back to us in the self-addressed stamped envelope.     If you have questions about this letter please contact your provider.     Sincerely,       Your [facility] Team

## 2019-06-19 ENCOUNTER — OFFICE VISIT (OUTPATIENT)
Dept: FAMILY MEDICINE | Facility: CLINIC | Age: 54
End: 2019-06-19
Payer: COMMERCIAL

## 2019-06-19 ENCOUNTER — ANCILLARY PROCEDURE (OUTPATIENT)
Dept: GENERAL RADIOLOGY | Facility: CLINIC | Age: 54
End: 2019-06-19
Attending: PHYSICIAN ASSISTANT
Payer: COMMERCIAL

## 2019-06-19 ENCOUNTER — TELEPHONE (OUTPATIENT)
Dept: FAMILY MEDICINE | Facility: CLINIC | Age: 54
End: 2019-06-19

## 2019-06-19 VITALS
OXYGEN SATURATION: 98 % | RESPIRATION RATE: 16 BRPM | BODY MASS INDEX: 29.57 KG/M2 | DIASTOLIC BLOOD PRESSURE: 75 MMHG | HEART RATE: 87 BPM | WEIGHT: 186 LBS | SYSTOLIC BLOOD PRESSURE: 116 MMHG | TEMPERATURE: 98.1 F

## 2019-06-19 DIAGNOSIS — M25.551 HIP PAIN, RIGHT: ICD-10-CM

## 2019-06-19 DIAGNOSIS — M54.50 LUMBAR PAIN: Primary | ICD-10-CM

## 2019-06-19 PROCEDURE — 99213 OFFICE O/P EST LOW 20 MIN: CPT | Performed by: PHYSICIAN ASSISTANT

## 2019-06-19 PROCEDURE — 73502 X-RAY EXAM HIP UNI 2-3 VIEWS: CPT

## 2019-06-19 PROCEDURE — 72100 X-RAY EXAM L-S SPINE 2/3 VWS: CPT

## 2019-06-19 RX ORDER — IBUPROFEN 600 MG/1
600 TABLET, FILM COATED ORAL
COMMUNITY
Start: 2019-03-30 | End: 2020-01-07

## 2019-06-19 RX ORDER — METHOCARBAMOL 500 MG/1
500 TABLET, FILM COATED ORAL PRN
COMMUNITY
Start: 2019-03-30 | End: 2022-10-07

## 2019-06-19 ASSESSMENT — PAIN SCALES - GENERAL: PAINLEVEL: MODERATE PAIN (5)

## 2019-06-19 NOTE — TELEPHONE ENCOUNTER
Patient needs a work note for today's visit. She is here in clinic now. It's just for Monday, the 17th. Please call her cell number when ready for .      Thank you

## 2019-06-19 NOTE — PROGRESS NOTES
SUBJECTIVE:                                                    Benita Benz is a 54 year old female who presents to clinic today for the following health issues:    Back Pain      Duration: on going for a while but worse over the last couple months         Specific cause: unsure    Description:   Location of pain: low back   Character of pain: aching, sore  Pain radiation:into the right shoulder/neck as well, right hip   New numbness or weakness in legs, not attributed to pain:  YES    Intensity: Currently 5/10    History:   Pain interferes with job: YES  History of back problems: yes  Any previous MRI or X-rays: None  Sees a specialist for back pain:  Possibly - maybe a long time ago  Therapies tried without relief: stretching    Alleviating factors:   Improved by: stretching, tylenol, methocarbamol       Precipitating factors:  Worsened by: Lifting and Bending      Also history of right hip pain. Fell on the lateral aspect of the hip x 2 over the last couple years. Pain since. No numbness/tingling. Pain with range of motion and laying on right side.     Problem list and histories reviewed & adjusted, as indicated.  Additional history: as documented    Patient Active Problem List   Diagnosis     Benign essential hypertension     Mild intermittent asthma without complication     Primary insomnia     Dysmenorrhea     Episodic tension-type headache, not intractable     Plantar fasciitis of left foot     Pain of left heel     Past Surgical History:   Procedure Laterality Date     EYE SURGERY  2014    toe surgery       Social History     Tobacco Use     Smoking status: Former Smoker     Smokeless tobacco: Never Used   Substance Use Topics     Alcohol use: Yes     Comment: rare     Family History   Problem Relation Age of Onset     Diabetes Mother      Thyroid Disease Mother      Sarcoidosis Mother          Current Outpatient Medications   Medication Sig Dispense Refill     albuterol (PROAIR HFA/PROVENTIL HFA/VENTOLIN  HFA) 108 (90 Base) MCG/ACT inhaler Inhale 2 puffs into the lungs every 6 hours as needed for shortness of breath / dyspnea or wheezing 18 g 0     amLODIPine (NORVASC) 10 MG tablet Take 1 tablet (10 mg) by mouth daily 90 tablet 1     diclofenac (VOLTAREN) 1 % topical gel Apply 2 grams to hands four times daily using enclosed dosing card. 100 g 1     Flaxseed, Linseed, (FLAX SEED OIL) 1000 MG capsule Take 1 capsule by mouth daily       HYDROcodone-acetaminophen (NORCO) 5-325 MG per tablet Take 1 tablet by mouth every 8 hours as needed for moderate to severe pain 20 tablet 0     ibuprofen (ADVIL/MOTRIN) 600 MG tablet Take 600 mg by mouth       lisinopril (PRINIVIL/ZESTRIL) 40 MG tablet Take 1 tablet (40 mg) by mouth daily 90 tablet 1     meclizine (ANTIVERT) 25 MG tablet Take 1 tablet (25 mg) by mouth every 6 hours as needed for dizziness 30 tablet 1     methocarbamol (ROBAXIN) 500 MG tablet Take 500 mg by mouth       Multiple Vitamins-Minerals (MULTIVITAMIN ADULT PO)        Omega-3 Fatty Acids (OMEGA-3 FISH OIL PO)        omeprazole (PRILOSEC) 20 MG CR capsule TAKE ONE CAPSULE BY MOUTH ONCE DAILY 90 capsule 1     order for DME Equipment being ordered: [id number] M$254   Walking Boot, Select, MED 1 Device 0     triamcinolone (KENALOG) 0.1 % cream Apply sparingly to affected area three times daily as needed for up to 14 days 45 g 0     zolpidem (AMBIEN) 10 MG tablet Take 1 tablet (10 mg) by mouth nightly as needed for sleep 30 tablet 5     Allergies   Allergen Reactions     Hmg-Coa-R Inhibitors Other (See Comments)     Numbness is legs     Simvastatin      Periferal neuropathy     Problem list, Medication list, Allergies, and Medical/Social/Surgical histories reviewed in [facility] and updated as appropriate.    ROS:  CV: NEGATIVE for chest pain, palpitations or peripheral edema  C: NEGATIVE for fever, chills, change in weight  E/M: NEGATIVE for ear, mouth and throat problems  R: NEGATIVE for significant cough or  SOB    OBJECTIVE:                                                    /75   Pulse 87   Temp 98.1  F (36.7  C) (Oral)   Resp 16   Wt 84.4 kg (186 lb)   SpO2 98%   BMI 29.57 kg/m    Body mass index is 29.57 kg/m .   GENERAL: healthy, alert, well nourished, well hydrated, no distress  RESP: lungs clear to auscultation - no rales, no rhonchi, no wheezes  CV: regular rates and rhythm, normal S1 S2, no S3 or S4 and no murmur, no click or rub -  ABDOMEN: soft, no tenderness, no  hepatosplenomegaly, no masses, normal bowel sounds  Lumber/Thoracic Spine Exam: Tender:  left para lumbar muscles, right para lumbar muscles  Non-tender:  thoracic spinous processes, lumbar spinous processes  Range of Motion:  full range of motion carmelina lower extremities   Strength:  5/5 carmelina lower extremities   Special tests:  negative straight leg raises  Hip Exam: right greater trochanter tenderness. full range of motion       Diagnostic test results:  Diagnostic Test Results:  Labs reviewed in Epic  Xray - right hip: neg. pending radiology  Lumbar spine. negative pending radiology      ASSESSMENT/PLAN:                                                        ICD-10-CM    1. Lumbar pain M54.5 XR Lumbar Spine 2/3 Views     [facility]   2. Hip pain, right M25.551 XR Pelvis and Hip Right 1 View     [facility]   recommend PHYSICAL THERAPY   Discussed cortisone shot for hip bursitis but wants to wait.   Follow up  1-2 months as needed. Follow up  With sport med if con't pain.    Tano Downey PA-C  [facility]

## 2019-06-19 NOTE — TELEPHONE ENCOUNTER
The patient is requesting a work note stating that she was out of work on Monday, 6/17/19.  She wants us to call her when it is ready for .  See note below.  Please advise. Thank you.  Serenity Bassett,

## 2019-06-19 NOTE — LETTER
[facility]  [address] [facility] [address]  Phone: [phone]    June 19, 2019        Benita Benz  [address] [address] [address]          To whom it may concern:    RE: Benita Benz    Patient was seen and treated today at our clinic and missed work. Please excuse Benita Benz from work on 6/17/19. May return next scheduled work day with no restrictions.     Please contact me for questions or concerns.      Sincerely,        Tano Downey PA-C

## 2019-06-19 NOTE — TELEPHONE ENCOUNTER
I spoke with patient  She gave permission for her daughter to  letter.     I brought the note up to the  and it is ready for .  I spoke to the patient and let her know.  Serenity Bassett,

## 2019-06-20 ASSESSMENT — ASTHMA QUESTIONNAIRES: ACT_TOTALSCORE: 23

## 2019-07-03 ENCOUNTER — THERAPY VISIT (OUTPATIENT)
Dept: PHYSICAL THERAPY | Facility: CLINIC | Age: 54
End: 2019-07-03
Payer: COMMERCIAL

## 2019-07-03 DIAGNOSIS — M25.551 HIP PAIN, RIGHT: ICD-10-CM

## 2019-07-03 DIAGNOSIS — M54.50 BILATERAL LOW BACK PAIN WITHOUT SCIATICA: ICD-10-CM

## 2019-07-03 DIAGNOSIS — M54.50 LUMBAR PAIN: ICD-10-CM

## 2019-07-03 PROCEDURE — 97110 THERAPEUTIC EXERCISES: CPT | Mod: GP | Performed by: PHYSICAL THERAPIST

## 2019-07-03 PROCEDURE — 97161 PT EVAL LOW COMPLEX 20 MIN: CPT | Mod: GP | Performed by: PHYSICAL THERAPIST

## 2019-07-03 PROCEDURE — 97112 NEUROMUSCULAR REEDUCATION: CPT | Mod: GP | Performed by: PHYSICAL THERAPIST
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
## 2019-07-31 DIAGNOSIS — K21.9 GASTROESOPHAGEAL REFLUX DISEASE WITHOUT ESOPHAGITIS: ICD-10-CM

## 2019-07-31 DIAGNOSIS — I10 BENIGN ESSENTIAL HYPERTENSION: ICD-10-CM

## 2019-07-31 NOTE — LETTER
August 1, 2019    Benita Benz  [address] [address] [facility] [address]    Dear Benita,       We recently received a refill request for lisinopril, amlodipine and omeprazole.  We have refilled this for a one time 30 day supply only because you are due for a:    Hypertension, GERD and asthma office visit and fasting lab appointment      Please schedule this lab appointment 4-5 days prior to the office visit.     Please call at your earliest convenience so that there will not be a delay with your future refills.          Thank you,   Your [facility] Team/  [phone]

## 2019-08-01 RX ORDER — AMLODIPINE BESYLATE 10 MG/1
TABLET ORAL
Qty: 30 TABLET | Refills: 0 | Status: SHIPPED | OUTPATIENT
Start: 2019-08-01 | End: 2019-11-14

## 2019-08-01 RX ORDER — LISINOPRIL 40 MG/1
TABLET ORAL
Qty: 30 TABLET | Refills: 0 | Status: SHIPPED | OUTPATIENT
Start: 2019-08-01 | End: 2019-11-14

## 2019-08-01 NOTE — TELEPHONE ENCOUNTER
Medication is being filled for 1 time refill only due to:  Patient needs to be seen for fasting lab appointment and appointment with the provider for further refills. Hypertension, gerd, and asthma.  Miriam CASHN, RN

## 2019-08-15 PROBLEM — M54.50 BILATERAL LOW BACK PAIN WITHOUT SCIATICA: Status: RESOLVED | Noted: 2019-07-03 | Resolved: 2019-08-15

## 2019-08-15 PROBLEM — M25.551 HIP PAIN, RIGHT: Status: RESOLVED | Noted: 2019-07-03 | Resolved: 2019-08-15

## 2019-11-26 ENCOUNTER — TELEPHONE (OUTPATIENT)
Dept: FAMILY MEDICINE | Facility: CLINIC | Age: 54
End: 2019-11-26

## 2019-11-26 DIAGNOSIS — Z00.01 ENCOUNTER FOR ROUTINE ADULT HEALTH EXAMINATION WITH ABNORMAL FINDINGS: ICD-10-CM

## 2019-11-26 RX ORDER — HYDROCODONE BITARTRATE AND ACETAMINOPHEN 5; 325 MG/1; MG/1
1 TABLET ORAL EVERY 8 HOURS PRN
Qty: 20 TABLET | Refills: 0 | OUTPATIENT
Start: 2019-11-26

## 2019-11-26 NOTE — TELEPHONE ENCOUNTER
Reason for Call:  Medication or medication refill:    Do you use a [facility]?  Name of the pharmacy and phone number for the current request:  [facility] [place] [phone]    Name of the medication requested: Hydrocodone    Other request:     Can we leave a detailed message on this number? YES    Phone number patient can be reached at: Cell number on file:    Telephone Information:   Mobile [phone]       Best Time:     Call taken on 11/26/2019 at 11:35 AM by Karyna Olivo

## 2019-11-26 NOTE — TELEPHONE ENCOUNTER
Controlled Substance Refill Request for Hydrocodone  Problem List Complete:  No     PROVIDER TO CONSIDER COMPLETION OF PROBLEM LIST AND OVERVIEW/CONTROLLED SUBSTANCE AGREEMENT    Last Written Prescription Date:  8/10/18  Last Fill Quantity: 20,   # refills: 0      Last Office Visit with [facility] primary care provider: 11/6/18       Controlled substance agreement:   Encounter-Level CSA:    There are no encounter-level csa.     Patient-Level CSA:    There are no patient-level csa.         Last Urine Drug Screen: No results found for: CDAUT, No results found for: COMDAT, No results found for: THC13, PCP13, COC13, MAMP13, OPI13, AMP13, BZO13, TCA13, MTD13, BAR13, OXY13, PPX13, BUP13       [email or web address]       checked in past 3 months?  Yes 11/26/19, no concerns       Morenita CASHN, RN, CPN

## 2019-11-29 NOTE — TELEPHONE ENCOUNTER
Patient is at the pharmacy right now.  She would like a refill on Hydrocodone.  Caller informed calls received after 3 pm may not be returned until following day.

## 2019-11-29 NOTE — TELEPHONE ENCOUNTER
"This got \"doned\" and never reviewed.  Can you please review? Thank you. Yoselyn Portillo R.N.    "

## 2019-12-01 NOTE — TELEPHONE ENCOUNTER
Unable to refill this. kathi' seen patient in over 6 months and it is controlled substance.   Requires an appointment to discuss if appropriate.     elsa

## 2019-12-02 ENCOUNTER — TELEPHONE (OUTPATIENT)
Dept: FAMILY MEDICINE | Facility: CLINIC | Age: 54
End: 2019-12-02

## 2019-12-03 NOTE — TELEPHONE ENCOUNTER
Called and spoke to patient, she did schedule an appointment for 12/13/19.Sophie Martino MA/FRANCESCO

## 2019-12-05 ENCOUNTER — TRANSFERRED RECORDS (OUTPATIENT)
Dept: HEALTH INFORMATION MANAGEMENT | Facility: CLINIC | Age: 54
End: 2019-12-05

## 2019-12-19 ENCOUNTER — TRANSFERRED RECORDS (OUTPATIENT)
Dept: HEALTH INFORMATION MANAGEMENT | Facility: CLINIC | Age: 54
End: 2019-12-19

## 2019-12-26 NOTE — PROGRESS NOTES
Subjective     Benita Benz is a 54 year old female who presents to clinic today for the following health issues:    HPI     Refill medications:    1)  HTN-No chest pain, shortness of breath, edema, PND, or orthopnea. No dizziness or vision changes. No side effects from medications. Blood pressure has been stable on medication.    Due for labs but has to bring  to dialysis right now so she will have to come back per patient. Will pend labs.     2) chronic tension headaches for years:   Headache  Onset: years    Description:   Location: unilateral but varies as to which side   Character: throbbing pain, dull pain, numbness  Frequency:  2-3 times a week  Duration:  For a few hours or just an hr    Intensity: mild    Progression of Symptoms:  improving    Accompanying Signs & Symptoms:  Stiff neck: no  Neck or upper back pain: no  Fever: no  Sinus pressure: no  Nausea or vomiting: no  Dizziness: YES- little  Numbness: no  Weakness: YES- little  Visual changes: no    History:   Head trauma: no  Family history of migraines: no  Previous tests for headaches: no  Neurologist evaluations: no  Able to do daily activities: YES  Wake with a headaches: no  Do headaches wake you up: no  Daily pain medication use: YES- ibuprofen and tylenol  Work/school stressors/changes: YES- unsure per pt possibly due to significant other's health.    Precipitating factors:   Does light make it worse: no  Does sound make it worse: no    Alleviating factors:  Does sleep help: YES    Therapies Tried and outcome: Ibuprofen (Advil, Motrin) and Tylenol      Went to [facility] in 2016 for chronic tension headache. Tried b vitamin complex at that time and they reviewed stress relief techniques.   She declined preventative then. I would recommend starting one. She is doing physical therapy for her shoulder now but neck physical therapy in the future may be helpful also. She has some memory concerns also so I will refer  back to neurology.     [place]amb[place] 2019.   No other fills. Last given narcotics by myself in 2018 for back/hip pain, used very sparingly.     Uses robaxin prn also. Doesn't need refill for this.     ambien-very rarely.           Patient Active Problem List   Diagnosis     Benign essential hypertension     Mild intermittent asthma without complication     Primary insomnia     Dysmenorrhea     Episodic tension-type headache, not intractable     Plantar fasciitis of left foot     Pain of left heel     Low back pain, unspecified back pain laterality, unspecified chronicity, unspecified whether sciatica present     Past Surgical History:   Procedure Laterality Date     EYE SURGERY  2014    toe surgery       Social History     Tobacco Use     Smoking status: Former Smoker     Smokeless tobacco: Never Used   Substance Use Topics     Alcohol use: Yes     Comment: rare     Family History   Problem Relation Age of Onset     Diabetes Mother      Thyroid Disease Mother      Sarcoidosis Mother          Current Outpatient Medications   Medication Sig Dispense Refill     albuterol (PROAIR HFA/PROVENTIL HFA/VENTOLIN HFA) 108 (90 Base) MCG/ACT inhaler Inhale 2 puffs into the lungs every 6 hours as needed for shortness of breath / dyspnea or wheezing 18 g 0     amLODIPine (NORVASC) 10 MG tablet TAKE 1 TABLET BY MOUTH ONCE DAILY 30 tablet 0     diclofenac (VOLTAREN) 1 % topical gel Apply 2 grams to hands four times daily using enclosed dosing card. 100 g 1     HYDROcodone-acetaminophen (NORCO) 5-325 MG tablet Take 1 tablet by mouth every 8 hours as needed for moderate to severe pain 20 tablet 0     ibuprofen (ADVIL/MOTRIN) 600 MG tablet Take 1 tablet (600 mg) by mouth every 8 hours as needed for moderate pain 60 tablet 5     lisinopril (PRINIVIL/ZESTRIL) 40 MG tablet Take 1 tablet (40 mg) by mouth daily 90 tablet 3     meclizine (ANTIVERT) 25 MG tablet Take 1 tablet (25 mg) by mouth every 6 hours as needed for  "dizziness 30 tablet 1     methocarbamol (ROBAXIN) 500 MG tablet Take 500 mg by mouth       omeprazole (PRILOSEC) 20 MG DR capsule TAKE 1 CAPSULE BY MOUTH ONCE DAILY 90 capsule 3     order for DME Equipment being ordered: AISHA ANNA$254   Walking Boot, Select, MED 1 Device 0     triamcinolone (KENALOG) 0.1 % cream Apply sparingly to affected area three times daily as needed for up to 14 days 45 g 0     zolpidem (AMBIEN) 10 MG tablet Take 1 tablet (10 mg) by mouth nightly as needed for sleep 30 tablet 5     Flaxseed, Linseed, (FLAX SEED OIL) 1000 MG capsule Take 1 capsule by mouth daily       Multiple Vitamins-Minerals (MULTIVITAMIN ADULT PO)        Omega-3 Fatty Acids (OMEGA-3 FISH OIL PO)        Allergies   Allergen Reactions     Hmg-Coa-R Inhibitors Other (See Comments)     Numbness is legs     Simvastatin      Periferal neuropathy         Reviewed and updated as needed this visit by Provider         Review of Systems   ROS COMP: Constitutional, HEENT, cardiovascular, pulmonary, GI, , musculoskeletal, neuro, skin, endocrine and psych systems are negative, except as otherwise noted.      Objective    /76   Pulse 85   Temp 98.7  F (37.1  C) (Oral)   Resp 16   Ht 1.702 m (5' 7\")   Wt 85.7 kg (189 lb)   SpO2 99%   Breastfeeding No   BMI 29.60 kg/m    Body mass index is 29.6 kg/m .  Physical Exam   GENERAL: healthy, alert and no distress  NECK: no adenopathy, no asymmetry, masses, or scars and thyroid normal to palpation  RESP: lungs clear to auscultation - no rales, rhonchi or wheezes  CV: regular rate and rhythm, normal S1 S2, no S3 or S4, no murmur, click or rub, no peripheral edema and peripheral pulses strong  MS: no gross musculoskeletal defects noted, no edema  NEURO: Normal strength and tone, mentation intact and speech normal  PSYCH: mentation appears normal, affect normal/bright    Diagnostic Test Results:  Labs reviewed in Epic        Assessment & Plan     1. Gastroesophageal reflux " disease without esophagitis  Stable doing well per patient  - omeprazole (PRILOSEC) 20 MG DR capsule; TAKE 1 CAPSULE BY MOUTH ONCE DAILY  Dispense: 90 capsule; Refill: 3    2. Benign essential hypertension  Stable recheck 6 months  - lisinopril (PRINIVIL/ZESTRIL) 40 MG tablet; Take 1 tablet (40 mg) by mouth daily  Dispense: 90 tablet; Refill: 3    3. Episodic tension-type headache, not intractable  See hpi  - ibuprofen (ADVIL/MOTRIN) 600 MG tablet; Take 1 tablet (600 mg) by mouth every 8 hours as needed for moderate pain  Dispense: 60 tablet; Refill: 5  - NEUROLOGY ADULT REFERRAL    4. Low back pain, unspecified back pain laterality, unspecified chronicity, unspecified whether sciatica present  No refills without OV, uses sparingly and will continue to do so  - HYDROcodone-acetaminophen (NORCO) 5-325 MG tablet; Take 1 tablet by mouth every 8 hours as needed for moderate to severe pain  Dispense: 20 tablet; Refill: 0  - ibuprofen (ADVIL/MOTRIN) 600 MG tablet; Take 1 tablet (600 mg) by mouth every 8 hours as needed for moderate pain  Dispense: 60 tablet; Refill: 5    5. Screening for diabetes mellitus    - Comprehensive metabolic panel; Future    6. Lipid screening    - Lipid panel reflex to direct LDL Fasting; Future     Patient Instructions   Please return fasting for cholesterol and diabetes screening, you can make a lab only appointment for this.  No food or drink other than water for 10 hours.      Also schedule a physical sometime in the next 1-2 months if possible    Schedule with neurology    -Narcotic medications can be addicting and therefore are used for short term pain control only.  They are not intended for long-term use.    -Take them only for severe pain as needed.    -Never mix with alcohol.    -Do not drive after taking this medication.    -No refills without an office visit. No replacements will be given.    -Keep these medications kept in a safe location.  You are responsible for them.  -Do not  give them to anyone else.  They are prescribed to you only.             Return in about 1 week (around 1/14/2020) for Lab Work.    Stella Santiago PA-C  [facility]

## 2020-01-07 ENCOUNTER — OFFICE VISIT (OUTPATIENT)
Dept: FAMILY MEDICINE | Facility: CLINIC | Age: 55
End: 2020-01-07
Payer: COMMERCIAL

## 2020-01-07 VITALS
RESPIRATION RATE: 16 BRPM | HEART RATE: 85 BPM | BODY MASS INDEX: 29.66 KG/M2 | WEIGHT: 189 LBS | DIASTOLIC BLOOD PRESSURE: 76 MMHG | SYSTOLIC BLOOD PRESSURE: 125 MMHG | TEMPERATURE: 98.7 F | OXYGEN SATURATION: 99 % | HEIGHT: 67 IN

## 2020-01-07 DIAGNOSIS — G44.219 EPISODIC TENSION-TYPE HEADACHE, NOT INTRACTABLE: ICD-10-CM

## 2020-01-07 DIAGNOSIS — I10 BENIGN ESSENTIAL HYPERTENSION: Primary | ICD-10-CM

## 2020-01-07 DIAGNOSIS — K21.9 GASTROESOPHAGEAL REFLUX DISEASE WITHOUT ESOPHAGITIS: ICD-10-CM

## 2020-01-07 DIAGNOSIS — Z13.1 SCREENING FOR DIABETES MELLITUS: ICD-10-CM

## 2020-01-07 DIAGNOSIS — M54.50 LOW BACK PAIN, UNSPECIFIED BACK PAIN LATERALITY, UNSPECIFIED CHRONICITY, UNSPECIFIED WHETHER SCIATICA PRESENT: ICD-10-CM

## 2020-01-07 DIAGNOSIS — Z13.220 LIPID SCREENING: ICD-10-CM

## 2020-01-07 PROCEDURE — 99214 OFFICE O/P EST MOD 30 MIN: CPT | Performed by: PHYSICIAN ASSISTANT

## 2020-01-07 RX ORDER — LISINOPRIL 40 MG/1
40 TABLET ORAL DAILY
Qty: 90 TABLET | Refills: 3 | Status: SHIPPED | OUTPATIENT
Start: 2020-01-07 | End: 2021-02-01

## 2020-01-07 RX ORDER — HYDROCODONE BITARTRATE AND ACETAMINOPHEN 5; 325 MG/1; MG/1
1 TABLET ORAL EVERY 8 HOURS PRN
Qty: 20 TABLET | Refills: 0 | Status: SHIPPED | OUTPATIENT
Start: 2020-01-07 | End: 2021-05-25

## 2020-01-07 RX ORDER — IBUPROFEN 600 MG/1
600 TABLET, FILM COATED ORAL EVERY 8 HOURS PRN
Qty: 60 TABLET | Refills: 5 | Status: SHIPPED | OUTPATIENT
Start: 2020-01-07 | End: 2021-05-25

## 2020-01-07 ASSESSMENT — MIFFLIN-ST. JEOR: SCORE: 1489.93

## 2020-01-07 NOTE — PATIENT INSTRUCTIONS
Please return fasting for cholesterol and diabetes screening, you can make a lab only appointment for this.  No food or drink other than water for 10 hours.      Also schedule a physical sometime in the next 1-2 months if possible    Schedule with neurology    -Narcotic medications can be addicting and therefore are used for short term pain control only.  They are not intended for long-term use.    -Take them only for severe pain as needed.    -Never mix with alcohol.    -Do not drive after taking this medication.    -No refills without an office visit. No replacements will be given.    -Keep these medications kept in a safe location.  You are responsible for them.  -Do not give them to anyone else.  They are prescribed to you only.

## 2020-01-08 ASSESSMENT — ASTHMA QUESTIONNAIRES: ACT_TOTALSCORE: 25

## 2020-01-16 ENCOUNTER — TRANSFERRED RECORDS (OUTPATIENT)
Dept: HEALTH INFORMATION MANAGEMENT | Facility: CLINIC | Age: 55
End: 2020-01-16

## 2020-02-13 ENCOUNTER — TRANSFERRED RECORDS (OUTPATIENT)
Dept: HEALTH INFORMATION MANAGEMENT | Facility: CLINIC | Age: 55
End: 2020-02-13

## 2020-03-10 DIAGNOSIS — R11.0 NAUSEA: ICD-10-CM

## 2020-03-10 DIAGNOSIS — R42 VERTIGO: ICD-10-CM

## 2020-03-11 RX ORDER — MECLIZINE HYDROCHLORIDE 25 MG/1
TABLET ORAL
Qty: 30 TABLET | Refills: 0 | Status: SHIPPED | OUTPATIENT
Start: 2020-03-11 | End: 2020-12-10

## 2020-03-12 ENCOUNTER — TRANSFERRED RECORDS (OUTPATIENT)
Dept: HEALTH INFORMATION MANAGEMENT | Facility: CLINIC | Age: 55
End: 2020-03-12

## 2020-04-20 ENCOUNTER — TELEPHONE (OUTPATIENT)
Dept: FAMILY MEDICINE | Facility: CLINIC | Age: 55
End: 2020-04-20

## 2020-04-22 NOTE — TELEPHONE ENCOUNTER
Left message on patient's voicemail that Stella would like her to have labs done.Sophie Martino MA/TC

## 2020-04-30 ENCOUNTER — TRANSFERRED RECORDS (OUTPATIENT)
Dept: HEALTH INFORMATION MANAGEMENT | Facility: CLINIC | Age: 55
End: 2020-04-30

## 2020-05-02 DIAGNOSIS — K21.9 GASTROESOPHAGEAL REFLUX DISEASE WITHOUT ESOPHAGITIS: ICD-10-CM

## 2020-05-02 DIAGNOSIS — Z13.1 SCREENING FOR DIABETES MELLITUS: ICD-10-CM

## 2020-05-02 DIAGNOSIS — Z13.220 LIPID SCREENING: ICD-10-CM

## 2020-05-02 LAB — VIT B12 SERPL-MCNC: 660 PG/ML (ref 193–986)

## 2020-05-02 PROCEDURE — 80061 LIPID PANEL: CPT | Performed by: PHYSICIAN ASSISTANT

## 2020-05-02 PROCEDURE — 36415 COLL VENOUS BLD VENIPUNCTURE: CPT | Performed by: PHYSICIAN ASSISTANT

## 2020-05-02 PROCEDURE — 82607 VITAMIN B-12: CPT | Performed by: PHYSICIAN ASSISTANT

## 2020-05-02 PROCEDURE — 80053 COMPREHEN METABOLIC PANEL: CPT | Performed by: PHYSICIAN ASSISTANT

## 2020-05-02 PROCEDURE — 83735 ASSAY OF MAGNESIUM: CPT | Performed by: PHYSICIAN ASSISTANT

## 2020-05-02 NOTE — LETTER
May 5, 2020      Benita Benz  [address] [address] [facility] [address]        Dear Benita,   Your test results are listed below.  Kidney function is just slightly worse on recent testing, this can be from being simply dehydrated I am not concerned we will just continue to follow it. Blood sugar normal. Liver tests normal. Cholesterol worsened again.   I would recheck cholesterol in 6 months and if still elevated discuss possibly starting a cholesterol lowering medication. magnesium and b12 normal.           If you have any questions or concerns, please call the clinic at [phone].     Sincerely,   Stella Santiago PA-C     Resulted Orders   Magnesium   Result Value Ref Range    Magnesium 2.1 1.6 - 2.3 mg/dL   Vitamin B12   Result Value Ref Range    Vitamin B12 660 193 - 986 pg/mL   Comprehensive metabolic panel   Result Value Ref Range    Sodium 138 133 - 144 mmol/L    Potassium 3.7 3.4 - 5.3 mmol/L    Chloride 102 94 - 109 mmol/L    Carbon Dioxide 27 20 - 32 mmol/L    Anion Gap 9 3 - 14 mmol/L    Glucose 89 70 - 99 mg/dL      Comment:      Fasting specimen    Urea Nitrogen 18 7 - 30 mg/dL    Creatinine 1.08 (H) 0.52 - 1.04 mg/dL    GFR Estimate 58 (L) >60 mL/min/[1.73_m2]      Comment:      Non  GFR Calc  Starting 12/18/2018, serum creatinine based estimated GFR (eGFR) will be   calculated using the Chronic Kidney Disease Epidemiology Collaboration   (CKD-EPI) equation.      GFR Estimate If Black 67 >60 mL/min/[1.73_m2]      Comment:       GFR Calc  Starting 12/18/2018, serum creatinine based estimated GFR (eGFR) will be   calculated using the Chronic Kidney Disease Epidemiology Collaboration   (CKD-EPI) equation.      Calcium 9.3 8.5 - 10.1 mg/dL    Bilirubin Total 0.3 0.2 - 1.3 mg/dL    Albumin 3.9 3.4 - 5.0 g/dL    Protein Total 7.8 6.8 - 8.8 g/dL    Alkaline Phosphatase 58 40 - 150 U/L    ALT 16 0 - 50 U/L    AST 14 0 - 45 U/L   Lipid panel reflex to direct LDL Fasting    Result Value Ref Range    Cholesterol 249 (H) <200 mg/dL      Comment:      Desirable:       <200 mg/dl    Triglycerides 108 <150 mg/dL      Comment:      Fasting specimen    HDL Cholesterol 57 >49 mg/dL    LDL Cholesterol Calculated 170 (H) <100 mg/dL      Comment:      Above desirable:  100-129 mg/dl  Borderline High:  130-159 mg/dL  High:             160-189 mg/dL  Very high:       >189 mg/dl      Non HDL Cholesterol 192 (H) <130 mg/dL      Comment:      Above Desirable:  130-159 mg/dl  Borderline high:  160-189 mg/dl  High:             190-219 mg/dl  Very high:       >219 mg/dl         If you have any questions or concerns, please call the clinic at the number listed above.

## 2020-05-04 LAB
ALBUMIN SERPL-MCNC: 3.9 G/DL (ref 3.4–5)
ALP SERPL-CCNC: 58 U/L (ref 40–150)
ALT SERPL W P-5'-P-CCNC: 16 U/L (ref 0–50)
ANION GAP SERPL CALCULATED.3IONS-SCNC: 9 MMOL/L (ref 3–14)
AST SERPL W P-5'-P-CCNC: 14 U/L (ref 0–45)
BILIRUB SERPL-MCNC: 0.3 MG/DL (ref 0.2–1.3)
BUN SERPL-MCNC: 18 MG/DL (ref 7–30)
CALCIUM SERPL-MCNC: 9.3 MG/DL (ref 8.5–10.1)
CHLORIDE SERPL-SCNC: 102 MMOL/L (ref 94–109)
CHOLEST SERPL-MCNC: 249 MG/DL
CO2 SERPL-SCNC: 27 MMOL/L (ref 20–32)
CREAT SERPL-MCNC: 1.08 MG/DL (ref 0.52–1.04)
GFR SERPL CREATININE-BSD FRML MDRD: 58 ML/MIN/{1.73_M2}
GLUCOSE SERPL-MCNC: 89 MG/DL (ref 70–99)
HDLC SERPL-MCNC: 57 MG/DL
LDLC SERPL CALC-MCNC: 170 MG/DL
MAGNESIUM SERPL-MCNC: 2.1 MG/DL (ref 1.6–2.3)
NONHDLC SERPL-MCNC: 192 MG/DL
POTASSIUM SERPL-SCNC: 3.7 MMOL/L (ref 3.4–5.3)
PROT SERPL-MCNC: 7.8 G/DL (ref 6.8–8.8)
SODIUM SERPL-SCNC: 138 MMOL/L (ref 133–144)
TRIGL SERPL-MCNC: 108 MG/DL

## 2020-05-05 DIAGNOSIS — E53.8 BIOTIN-(PROPIONYL-COA-CARBOXYLASE) LIGASE DEFICIENCY: ICD-10-CM

## 2020-05-05 DIAGNOSIS — R51.9 FACIAL PAIN: Primary | ICD-10-CM

## 2020-05-05 DIAGNOSIS — M54.81 OCCIPITAL NEURALGIA: ICD-10-CM

## 2020-05-05 NOTE — RESULT ENCOUNTER NOTE
Dear Benita,  Your test results are listed below.  Kidney function is just slightly worse on recent testing, this can be from being simply dehydrated I am not concerned we will just continue to follow it. Blood sugar normal. Liver tests normal. Cholesterol worsened again.   I would recheck cholesterol in 6 months and if still elevated discuss possibly starting a cholesterol lowering medication. magnesium and b12 normal.           If you have any questions or concerns, please call the clinic at [phone].    Sincerely,  Stella Santiago PA-C

## 2020-05-20 ENCOUNTER — TELEPHONE (OUTPATIENT)
Dept: FAMILY MEDICINE | Facility: CLINIC | Age: 55
End: 2020-05-20

## 2020-05-20 DIAGNOSIS — E78.5 DYSLIPIDEMIA WITH HIGH LDL AND LOW HDL: Primary | ICD-10-CM

## 2020-05-20 NOTE — TELEPHONE ENCOUNTER
Reason for Call:  Other prescription    Detailed comments: pt would like an rx for cholestrol med.  It is still high. Caller informed that calls received after 3pm may not be returned same day. Uses [facility] on [address].      Phone Number Patient can be reached at: Cell number on file:    Telephone Information:   Mobile [phone]       Best Time: any    Can we leave a detailed message on this number? YES    Call taken on 5/20/2020 at 3:51 PM by Aline Ly

## 2020-05-21 RX ORDER — PRAVASTATIN SODIUM 40 MG
40 TABLET ORAL DAILY
Qty: 90 TABLET | Refills: 1 | Status: SHIPPED | OUTPATIENT
Start: 2020-05-21 | End: 2021-02-01

## 2020-06-17 DIAGNOSIS — I10 BENIGN ESSENTIAL HYPERTENSION: ICD-10-CM

## 2020-06-17 NOTE — TELEPHONE ENCOUNTER
Routing refill request to provider for review/approval because:  Labs not current:  Ramses Gutierrez BSN, RN

## 2020-06-18 RX ORDER — AMLODIPINE BESYLATE 10 MG/1
TABLET ORAL
Qty: 30 TABLET | Refills: 0 | Status: SHIPPED | OUTPATIENT
Start: 2020-06-18 | End: 2020-09-03

## 2020-06-22 ENCOUNTER — OFFICE VISIT (OUTPATIENT)
Dept: URGENT CARE | Facility: URGENT CARE | Age: 55
End: 2020-06-22
Payer: COMMERCIAL

## 2020-06-22 ENCOUNTER — TRANSFERRED RECORDS (OUTPATIENT)
Dept: HEALTH INFORMATION MANAGEMENT | Facility: CLINIC | Age: 55
End: 2020-06-22

## 2020-06-22 VITALS
TEMPERATURE: 96.2 F | SYSTOLIC BLOOD PRESSURE: 132 MMHG | OXYGEN SATURATION: 100 % | HEART RATE: 72 BPM | DIASTOLIC BLOOD PRESSURE: 80 MMHG

## 2020-06-22 DIAGNOSIS — M79.661 RIGHT CALF PAIN: Primary | ICD-10-CM

## 2020-06-22 PROCEDURE — 99214 OFFICE O/P EST MOD 30 MIN: CPT | Performed by: NURSE PRACTITIONER

## 2020-06-23 NOTE — PROGRESS NOTES
SUBJECTIVE:    Benita Benz is a 55 year old female who is seen for leg pain  Right leg pain increasing over the past month. Concerned about blood clot it is in calf area  No injury travel immobilization    Patient's past medical, surgical, social and family histories reviewed.        REVIEW OF SYSTEMS:  CONSTITUTIONAL:NEGATIVE for fever, chills, change in weight  INTEGUMENTARY/SKIN: NEGATIVE for worrisome rashes, moles or lesions  MUSCULOSKELETAL:See HPI above  NEURO: NEGATIVE for weakness, dizziness or paresthesias    /80   Pulse 72   Temp 96.2  F (35.7  C) (Tympanic)   SpO2 100%   EXAM:  GENERAL APPEARANCE: alert and no distress   GAIT: NORMAL  SKIN: no suspicious lesions or rashes  NEURO: normal strength and tone, sentation intact, reflexes normal, mentation intact, speech normal, DTR symmetrically normal in upper extremities and DTR symmetrically normal in lower extremities  PSYCH:  mentation appears normal and affect normal/bright  MUSCULOSKELETAL:  Right calf tenderness, no redness warmth swelling signs of infection    ASSESSMENT/PLAN  (M79.661) Right calf pain  (primary encounter diagnosis)      PLAN:  Advised to be seen in ER to rule out DVT given calf pain and history  She will go to [facility] now    CHIDI Joyner CNP

## 2020-06-25 ENCOUNTER — TRANSFERRED RECORDS (OUTPATIENT)
Dept: HEALTH INFORMATION MANAGEMENT | Facility: CLINIC | Age: 55
End: 2020-06-25

## 2020-07-16 ENCOUNTER — TRANSFERRED RECORDS (OUTPATIENT)
Dept: HEALTH INFORMATION MANAGEMENT | Facility: CLINIC | Age: 55
End: 2020-07-16

## 2020-07-22 ENCOUNTER — ANCILLARY PROCEDURE (OUTPATIENT)
Dept: GENERAL RADIOLOGY | Facility: CLINIC | Age: 55
End: 2020-07-22
Attending: PODIATRIST
Payer: COMMERCIAL

## 2020-07-22 ENCOUNTER — VIRTUAL VISIT (OUTPATIENT)
Dept: PODIATRY | Facility: CLINIC | Age: 55
End: 2020-07-22
Payer: COMMERCIAL

## 2020-07-22 VITALS — DIASTOLIC BLOOD PRESSURE: 81 MMHG | SYSTOLIC BLOOD PRESSURE: 122 MMHG | HEART RATE: 71 BPM

## 2020-07-22 DIAGNOSIS — M79.671 PAIN OF RIGHT HEEL: Primary | ICD-10-CM

## 2020-07-22 DIAGNOSIS — M79.671 PAIN OF RIGHT HEEL: ICD-10-CM

## 2020-07-22 PROCEDURE — 99213 OFFICE O/P EST LOW 20 MIN: CPT | Mod: 95 | Performed by: PODIATRIST

## 2020-07-22 PROCEDURE — 73650 X-RAY EXAM OF HEEL: CPT | Mod: RT | Performed by: RADIOLOGY

## 2020-07-22 RX ORDER — TRAMADOL HYDROCHLORIDE 50 MG/1
50 TABLET ORAL EVERY 8 HOURS PRN
Qty: 20 TABLET | Refills: 0 | Status: SHIPPED | OUTPATIENT
Start: 2020-07-22 | End: 2020-07-29

## 2020-07-22 NOTE — PROGRESS NOTES
"Benita Benz is a 55 year old female who is being evaluated via a billable video visit.      The patient has been notified of following:     \"This video visit will be conducted via a call between you and your physician/provider. We have found that certain health care needs can be provided without the need for an in-person physical exam.  This service lets us provide the care you need with a video conversation.  If a prescription is necessary we can send it directly to your pharmacy.  If lab work is needed we can place an order for that and you can then stop by our lab to have the test done at a later time.    Video visits are billed at different rates depending on your insurance coverage.  Please reach out to your insurance provider with any questions.    If during the course of the call the physician/provider feels a video visit is not appropriate, you will not be charged for this service.\"    Patient has given verbal consent for Video visit? Yes  How would you like to obtain your AVS? Mail a copy  If you are dropped from the video visit, the video invite should be resent to: Text to cell phone: [phone]  Will anyone else be joining your video visit? No        Video-Visit Details    Type of service:  Video Visit    Originating Location (pt. Location): Home    Distant Location (provider location):  [place]     Platform used for Video Visit: Anju Dumont CMA    I started this visit as a video visit. I determined that Benita would need to be seen in person and she came in at 1pm and the following encounter took place:    Chief Complaint:   Chief Complaint   Patient presents with     RECHECK     Left foot          Allergies   Allergen Reactions     Hmg-Coa-R Inhibitors Other (See Comments)     Numbness is legs     Simvastatin      Periferal neuropathy         Subjective: Benita is a 55 year old female who presents to the clinic today for a follow up of right heel pain. She relates " that she has had continued pain in the right foot for a few months. 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 Her right heel throbs throughout the day. She relates that she has had previous injections by me in the right heel.     Objective  Data Unavailable 71 Data Unavailable 122/81 Data Unavailable 0 lbs 0 oz  DP and PT pulses 2/4 BL. CRT WNL.  Gross sensation is intact.  Pain noted with palpation of both the medial and lateral calcaneal walls. Pain noted with lateral calcaneal squeeze.No pain noted with palpation of the PT and peroneal tendons. Normal ankle ROM. Pain with STJ ROM.     right calcaneal xrays indicated in 2 weightbearing views.    Some sclerosis noted to the plantar calcaneal tuberosity. Unknown significance. Plantar and retrocalcaneal spurring noted.       Assessment: Pain in the right calcaneus. I discusses this with her today. Before XRs, we spoke about injections, and she related that she has had 2 injections into the right heel. We only have record of 1 left heel injection in 2018. I would recommend booting anyway, as she is having quite a bit of pain.     Plan:   - Pt seen and evaluated.  - XRs taken and discussed with her.   - Recommend CAM booting for a month. This was dispensed to her. If her pain starts to pinpoint in the plantar heel, can consider injection therapy. However she thinks that she has had multiple injections in the right.   - Rx for tramadol.   - Pt to return to clinic in 1 month.

## 2020-07-22 NOTE — LETTER
Return to Work  2020     Seen today: yes    Patient:  Benita Benz  :   1965  MRN:     [id number]  Physician: AMOS ARREOLA    Benita Benz may not return to work until Date: 2020.      The next clinic appointment is scheduled for (date/time) 1 month.    Patient limitations:  Benita needs to wear her boot at all times when she returns and she needs to be able to sit if she is having pain.               Electronically signed by Amos Arreola DPM

## 2020-07-22 NOTE — LETTER
"    7/22/2020         RE: Benita Benz  [address] [facility] [address]        Dear Colleague,    Thank you for referring your patient, Benita Benz, to the [facility]. Please see a copy of my visit note below.    Benita Benz is a 55 year old female who is being evaluated via a billable video visit.      The patient has been notified of following:     \"This video visit will be conducted via a call between you and your physician/provider. We have found that certain health care needs can be provided without the need for an in-person physical exam.  This service lets us provide the care you need with a video conversation.  If a prescription is necessary we can send it directly to your pharmacy.  If lab work is needed we can place an order for that and you can then stop by our lab to have the test done at a later time.    Video visits are billed at different rates depending on your insurance coverage.  Please reach out to your insurance provider with any questions.    If during the course of the call the physician/provider feels a video visit is not appropriate, you will not be charged for this service.\"    Patient has given verbal consent for Video visit? Yes  How would you like to obtain your AVS? Mail a copy  If you are dropped from the video visit, the video invite should be resent to: Text to cell phone: [phone]  Will anyone else be joining your video visit? No        Video-Visit Details    Type of service:  Video Visit    Originating Location (pt. Location): Home    Distant Location (provider location):  [facility]     Platform used for Video Visit: Anju Dumont CMA    I started this visit as a video visit. I determined that Benita would need to be seen in person and she came in at 1pm and the following encounter took place:    Chief Complaint:   Chief Complaint   Patient presents with     RECHECK     Left foot          Allergies   Allergen " Reactions     Hmg-Coa-R Inhibitors Other (See Comments)     Numbness is legs     Simvastatin      Periferal neuropathy         Subjective: Benita is a 55 year old female who presents to the clinic today for a follow up of right heel pain. She relates that she has had continued pain in the right foot for a few months. 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 Her right heel throbs throughout the day. She relates that she has had previous injections by me in the right heel.     Objective  Data Unavailable 71 Data Unavailable 122/81 Data Unavailable 0 lbs 0 oz  DP and PT pulses 2/4 BL. CRT WNL.  Gross sensation is intact.  Pain noted with palpation of both the medial and lateral calcaneal walls. Pain noted with lateral calcaneal squeeze.No pain noted with palpation of the PT and peroneal tendons. Normal ankle ROM. Pain with STJ ROM.     right calcaneal xrays indicated in 2 weightbearing views.    Some sclerosis noted to the plantar calcaneal tuberosity. Unknown significance. Plantar and retrocalcaneal spurring noted.       Assessment: Pain in the right calcaneus. I discusses this with her today. Before XRs, we spoke about injections, and she related that she has had 2 injections into the right heel. We only have record of 1 left heel injection in 2018. I would recommend booting anyway, as she is having quite a bit of pain.     Plan:   - Pt seen and evaluated.  - XRs taken and discussed with her.   - Recommend CAM booting for a month. This was dispensed to her. If her pain starts to pinpoint in the plantar heel, can consider injection therapy. However she thinks that she has had multiple injections in the right.   - Rx for tramadol.   - Pt to return to clinic in 1 month.               Again, thank you for allowing me to participate in the care of your patient.        Sincerely,        Amos Arreola DPM

## 2020-07-27 ENCOUNTER — TELEPHONE (OUTPATIENT)
Dept: PODIATRY | Facility: CLINIC | Age: 55
End: 2020-07-27

## 2020-07-27 NOTE — TELEPHONE ENCOUNTER
[facility] Call Center    Phone Message    May a detailed message be left on voicemail: yes     Reason for Call: Other: would like to speak to someone about the xray with stress fracture     Action Taken: Message routed to:  Adult Clinics: Podiatry p [id number]    Travel Screening: Not Applicable

## 2020-07-27 NOTE — LETTER
July 22, 2020      RE: Benita Benz  [address] [facility] [facility] [address]       To whom it may concern:    Benita Benz was seen via virtual visit in our clinic today. X-rays of the right foot show a suspected stress fracture of the calcaneus.     Sincerely,      Amos Arreola DPM

## 2020-07-28 ENCOUNTER — TRANSFERRED RECORDS (OUTPATIENT)
Dept: HEALTH INFORMATION MANAGEMENT | Facility: CLINIC | Age: 55
End: 2020-07-28

## 2020-07-28 NOTE — PROGRESS NOTES
Subjective     Benita Benz is a 55 year old female who presents to clinic today for the following health issues:    HPI     Joint Pain    Onset: on and off for a while now but worst the past few weeks now    Description:   Location: left shoulder  Character: Sharp and Dull ache    Intensity: moderate    Progression of Symptoms: worse, same    Accompanying Signs & Symptoms:  Other symptoms: none    History:   Previous similar pain: YES      Precipitating factors:   Trauma or overuse: YES- possible overuse?    Alleviating factors:  Improved by: rest/inactivity, massage, stretching and exercises    Therapies Tried and outcome: noted above and helps a little      Patent has been seeing ortho for right shoulder tendonitis (rotator cuff and bicep tendonitis and tear of supraspinatous), she had this injected which did help some.  She did physical therapy for this but has stopped due to covid. Right shoulder feels better but still has pain.     Her left shoulder has been hurting her off and on since 2 or more years, started when her right shoulder injury occurred but she states a lot of attention was placed on her right shoulder which hurt at the time but she thinks her left shoulder then was compensating for not using the right as much and then that started hurting also.    Robaxin does not help much.   Tylenol helps some. Has been taking that. Never took tramadol given to her, was going to avoid nsaids due to elevated creatinine. Will consider physical therapy virtually she is too nervous with covid right now to do regular physical therapy. Normal range of motion in that shoulder just sharp pain off and on.     [place] in past year.     Works for [employer]. Now instead of nursing assistant works in . Less stress on her shoulder.   Unsure if this work comp or not. She wants to talk to them about it, but wanted to touch base with me first.           Patient Active  Problem List   Diagnosis     Benign essential hypertension     Mild intermittent asthma without complication     Primary insomnia     Dysmenorrhea     Episodic tension-type headache, not intractable     Plantar fasciitis of left foot     Pain of left heel     Low back pain, unspecified back pain laterality, unspecified chronicity, unspecified whether sciatica present     Advanced directives, counseling/discussion     Past Surgical History:   Procedure Laterality Date     EYE SURGERY  2014    toe surgery       Social History     Tobacco Use     Smoking status: Former Smoker     Smokeless tobacco: Never Used   Substance Use Topics     Alcohol use: Yes     Comment: rare     Family History   Problem Relation Age of Onset     Diabetes Mother      Thyroid Disease Mother      Sarcoidosis Mother          Current Outpatient Medications   Medication Sig Dispense Refill     albuterol (PROAIR HFA/PROVENTIL HFA/VENTOLIN HFA) 108 (90 Base) MCG/ACT inhaler Inhale 2 puffs into the lungs every 6 hours as needed for shortness of breath / dyspnea or wheezing 18 g 0     amLODIPine (NORVASC) 10 MG tablet TAKE 1 TABLET BY MOUTH ONCE DAILY (NEED  OFFICE  VISIT  BEFORE  ANY  MORE  REFILLS.  COME  FASTING) 30 tablet 0     diclofenac (VOLTAREN) 1 % topical gel Apply 2 grams to hands four times daily using enclosed dosing card. (Patient taking differently: as needed Apply 2 grams to hands four times daily using enclosed dosing card.) 100 g 1     HYDROcodone-acetaminophen (NORCO) 5-325 MG tablet Take 1 tablet by mouth every 8 hours as needed for moderate to severe pain 20 tablet 0     ibuprofen (ADVIL/MOTRIN) 600 MG tablet Take 1 tablet (600 mg) by mouth every 8 hours as needed for moderate pain 60 tablet 5     lisinopril (PRINIVIL/ZESTRIL) 40 MG tablet Take 1 tablet (40 mg) by mouth daily 90 tablet 3     meclizine (ANTIVERT) 25 MG tablet TAKE 1 TABLET BY MOUTH EVERY 6 HOURS AS NEEDED FOR DIZZINESS 30 tablet 0     methocarbamol (ROBAXIN) 500  MG tablet Take 500 mg by mouth as needed        Multiple Vitamins-Minerals (MULTIVITAMIN ADULT PO)        Omega-3 Fatty Acids (OMEGA-3 FISH OIL PO)        omeprazole (PRILOSEC) 20 MG DR capsule TAKE 1 CAPSULE BY MOUTH ONCE DAILY 90 capsule 3     order for DME Equipment being ordered: AISHA ANNA$254   Walking Boot, Select, MED 1 Device 0     pravastatin (PRAVACHOL) 40 MG tablet Take 1 tablet (40 mg) by mouth daily 90 tablet 1     triamcinolone (KENALOG) 0.1 % cream Apply sparingly to affected area three times daily as needed for up to 14 days 45 g 0     zolpidem (AMBIEN) 10 MG tablet Take 1 tablet (10 mg) by mouth nightly as needed for sleep 30 tablet 5     Flaxseed, Linseed, (FLAX SEED OIL) 1000 MG capsule Take 1 capsule by mouth as needed        Allergies   Allergen Reactions     Hmg-Coa-R Inhibitors Other (See Comments)     Numbness is legs     Simvastatin      Periferal neuropathy       Reviewed and updated as needed this visit by Provider         Review of Systems   Constitutional, HEENT, cardiovascular, pulmonary, GI, , musculoskeletal, neuro, skin, endocrine and psych systems are negative, except as otherwise noted.      Objective    /66   Pulse 72   Temp 98  F (36.7  C) (Tympanic)   Resp 16   Wt 88 kg (194 lb)   SpO2 100%   Breastfeeding No   BMI 30.38 kg/m    Body mass index is 30.38 kg/m .  Physical Exam   GENERAL: alert, no distress and obese  NECK: no adenopathy, no asymmetry, masses, or scars and thyroid normal to palpation  RESP: lungs clear to auscultation - no rales, rhonchi or wheezes  CV: regular rate and rhythm, normal S1 S2, no S3 or S4, no murmur, click or rub, no peripheral edema and peripheral pulses strong  MS: no gross musculoskeletal defects noted, no edema. Left shoulder-range of motion grossly intact. Tender over anterior shoulder and biceps tendon left shoulder. No impingement. strength normal. Distal sensation and pulses intact.   NEURO: Normal strength and tone,  "mentation intact and speech normal  PSYCH: mentation appears normal, affect normal/bright            Assessment & Plan     1. Left shoulder pain, unspecified chronicity  tenontitis likely  Consider xray and mri in future but she will find out first if claiming as work comp, it does sound related to the initial injury at work years ago    2. Advanced directives, counseling/discussion      3. High cholesterol  On statin now ,needs rechecked, not fasting will come back  - Lipid panel reflex to direct LDL Fasting; Future  - Comprehensive metabolic panel (BMP + Alb, Alk Phos, ALT, AST, Total. Bili, TP); Future    4. Creatinine elevation    See below will recheck    is on dialysis so makes her nervous per patient  Is avoiding nsaids    BMI:   Estimated body mass index is 30.38 kg/m  as calculated from the following:    Height as of 1/7/20: 1.702 m (5' 7\").    Weight as of this encounter: 88 kg (194 lb).   Weight management plan: Discussed healthy diet and exercise guidelines    Patient Instructions   Please return fasting for cholesterol and kidney function,  you can make a lab only appointment for this.  No food or drink other than water for 10 hours. Drink plenty of water in the morning.     Ask your work regarding left shoulder and ? Claim  If needed let me know and I will refer you back to ortho        Return in about 1 week (around 8/11/2020) for Lab Work.    Stella Santiago PA-C  [facility]    "

## 2020-07-29 ENCOUNTER — MYC MEDICAL ADVICE (OUTPATIENT)
Dept: FAMILY MEDICINE | Facility: CLINIC | Age: 55
End: 2020-07-29

## 2020-07-29 NOTE — TELEPHONE ENCOUNTER
Patient has been notified a letter has been drafted at Dr. Levy request.  She will print this from [employer].  An BONITA form will be emailed to her to request her imaging.

## 2020-08-04 ENCOUNTER — OFFICE VISIT (OUTPATIENT)
Dept: FAMILY MEDICINE | Facility: CLINIC | Age: 55
End: 2020-08-04
Payer: COMMERCIAL

## 2020-08-04 VITALS
TEMPERATURE: 98 F | RESPIRATION RATE: 16 BRPM | SYSTOLIC BLOOD PRESSURE: 116 MMHG | OXYGEN SATURATION: 100 % | WEIGHT: 194 LBS | HEART RATE: 72 BPM | DIASTOLIC BLOOD PRESSURE: 66 MMHG | BODY MASS INDEX: 30.38 KG/M2

## 2020-08-04 DIAGNOSIS — Z71.89 ADVANCED DIRECTIVES, COUNSELING/DISCUSSION: ICD-10-CM

## 2020-08-04 DIAGNOSIS — E78.00 HIGH CHOLESTEROL: ICD-10-CM

## 2020-08-04 DIAGNOSIS — M25.512 LEFT SHOULDER PAIN, UNSPECIFIED CHRONICITY: Primary | ICD-10-CM

## 2020-08-04 PROCEDURE — 99214 OFFICE O/P EST MOD 30 MIN: CPT | Performed by: PHYSICIAN ASSISTANT

## 2020-08-04 NOTE — PATIENT INSTRUCTIONS
Please return fasting for cholesterol and kidney function,  you can make a lab only appointment for this.  No food or drink other than water for 10 hours. Drink plenty of water in the morning.     Ask your work regarding left shoulder and ? Claim  If needed let me know and I will refer you back to ortho

## 2020-08-05 ASSESSMENT — ASTHMA QUESTIONNAIRES: ACT_TOTALSCORE: 25

## 2020-08-18 DIAGNOSIS — E78.00 HIGH CHOLESTEROL: ICD-10-CM

## 2020-08-18 LAB
ALBUMIN SERPL-MCNC: 3.5 G/DL (ref 3.4–5)
ALP SERPL-CCNC: 68 U/L (ref 40–150)
ALT SERPL W P-5'-P-CCNC: 19 U/L (ref 0–50)
ANION GAP SERPL CALCULATED.3IONS-SCNC: 6 MMOL/L (ref 3–14)
AST SERPL W P-5'-P-CCNC: 14 U/L (ref 0–45)
BILIRUB SERPL-MCNC: 0.4 MG/DL (ref 0.2–1.3)
BUN SERPL-MCNC: 13 MG/DL (ref 7–30)
CALCIUM SERPL-MCNC: 8.8 MG/DL (ref 8.5–10.1)
CHLORIDE SERPL-SCNC: 103 MMOL/L (ref 94–109)
CHOLEST SERPL-MCNC: 238 MG/DL
CO2 SERPL-SCNC: 29 MMOL/L (ref 20–32)
CREAT SERPL-MCNC: 0.94 MG/DL (ref 0.52–1.04)
GFR SERPL CREATININE-BSD FRML MDRD: 68 ML/MIN/{1.73_M2}
GLUCOSE SERPL-MCNC: 88 MG/DL (ref 70–99)
HDLC SERPL-MCNC: 74 MG/DL
LDLC SERPL CALC-MCNC: 138 MG/DL
NONHDLC SERPL-MCNC: 164 MG/DL
POTASSIUM SERPL-SCNC: 4.6 MMOL/L (ref 3.4–5.3)
PROT SERPL-MCNC: 7.7 G/DL (ref 6.8–8.8)
SODIUM SERPL-SCNC: 138 MMOL/L (ref 133–144)
TRIGL SERPL-MCNC: 128 MG/DL

## 2020-08-18 PROCEDURE — 36415 COLL VENOUS BLD VENIPUNCTURE: CPT | Performed by: PHYSICIAN ASSISTANT

## 2020-08-18 PROCEDURE — 80061 LIPID PANEL: CPT | Performed by: PHYSICIAN ASSISTANT

## 2020-08-18 PROCEDURE — 80053 COMPREHEN METABOLIC PANEL: CPT | Performed by: PHYSICIAN ASSISTANT

## 2020-08-19 NOTE — RESULT ENCOUNTER NOTE
Nia Joy,       Your recent test results are attached, if you have any questions or concerns please feel free to contact me via e-mail or call [phone].  Sodium and potassium normal. Blood sugar (glucose) normal.  Creatinine and GFR normal, which means kidney function is normal. Liver tests normal. Cholesterol has improved.         Sincerely,  Stella Santiago PA-C

## 2020-08-25 ENCOUNTER — TRANSFERRED RECORDS (OUTPATIENT)
Dept: HEALTH INFORMATION MANAGEMENT | Facility: CLINIC | Age: 55
End: 2020-08-25

## 2020-08-25 ENCOUNTER — TELEPHONE (OUTPATIENT)
Dept: PODIATRY | Facility: CLINIC | Age: 55
End: 2020-08-25

## 2020-08-25 NOTE — TELEPHONE ENCOUNTER
Health Call Center    Phone Message    May a detailed message be left on voicemail: no     Reason for Call: PT called today.  She said that Dr. Arreola told her to wear boot for 4 weeks.  She said that the 4 weeks are up and she wants to know if she should continue wearing it or not.  Also wants to know if she should schedule an appointment to see him for follow up.  Thanks.    Action Taken: Message routed to:  Adult Clinics: Podiatry p [id number]    Travel Screening: Not Applicable

## 2020-09-03 DIAGNOSIS — I10 BENIGN ESSENTIAL HYPERTENSION: ICD-10-CM

## 2020-09-03 RX ORDER — AMLODIPINE BESYLATE 10 MG/1
10 TABLET ORAL DAILY
Qty: 90 TABLET | Refills: 3 | Status: SHIPPED | OUTPATIENT
Start: 2020-09-03 | End: 2021-05-25

## 2020-09-09 ENCOUNTER — OFFICE VISIT (OUTPATIENT)
Dept: PODIATRY | Facility: CLINIC | Age: 55
End: 2020-09-09
Payer: COMMERCIAL

## 2020-09-09 DIAGNOSIS — M72.2 PLANTAR FASCIITIS OF LEFT FOOT: ICD-10-CM

## 2020-09-09 DIAGNOSIS — M79.671 PAIN OF RIGHT HEEL: Primary | ICD-10-CM

## 2020-09-09 PROCEDURE — 99213 OFFICE O/P EST LOW 20 MIN: CPT | Performed by: PODIATRIST

## 2020-09-09 NOTE — NURSING NOTE
Benita Benz's chief complaint for this visit includes:  Chief Complaint   Patient presents with     Right Foot - Follow Up     PCP: Stella Santiago    Referring Provider:  No referring provider defined for this encounter.    There were no vitals taken for this visit.  Data Unavailable   Pain, 5/10, constant    Do you need any medication refills at today's visit? N/a    Boot off and on, still having same pain, prognosis, restrictions?

## 2020-09-09 NOTE — PATIENT INSTRUCTIONS
Thanks for coming today.  Ortho/Sports Medicine Clinic  [address] [place] [address]    To schedule future appointments in Ortho Clinic, you may call [phone].    To schedule ordered imaging by your provider:   Call Central Imaging [phone]    To schedule an injection ordered by your provider:  Call Central Imaging Injection scheduling line: [phone]  [email or web address] available online at:  [email or web address]    Please call if any further questions or concerns ([phone]).  Clinic hours 8 am to 5 pm.    Return to clinic (call) if symptoms worsen or fail to improve.

## 2020-09-09 NOTE — LETTER
9/9/2020         RE: Benita Benz  [address] [address]        Dear Colleague,    Thank you for referring your patient, Benita Benz, to the [facility]. Please see a copy of my visit note below.    Chief Complaint:   Chief Complaint   Patient presents with     Right Foot - Follow Up          Allergies   Allergen Reactions     Hmg-Coa-R Inhibitors Other (See Comments)     Numbness is legs     Simvastatin      Periferal neuropathy         Subjective: Benita is a 55 year old female who presents to the clinic today for a follow up of right foot plantar fasciitis.  She relates that she did wear the boot for a number of weeks.  She relates that she has had a decrease in the pain.  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    Objective  Data Unavailable Data Unavailable Data Unavailable Data Unavailable Data Unavailable 0 lbs 0 oz  Pain is noted today with lateral compression of the right calcaneus.  She does have some pain noted with palpation of the medial attachment of the plantar fascia on the calcaneus of the right.  No pain noted along the courses of the PT, Achilles, or peroneal tendons on the right side.  No edema or ecchymosis noted.    Assessment: Plantar fasciitis of the right foot.  She may have had a stress fracture in the calcaneus, however x-rays were unequivocal.    Plan:   - Pt seen and evaluated  -She can start to go back to her normal activity.  If she needs to walk further, she should wear the boot.  -I have recommended she try some physical therapy ultrasound.  She will do this.  Order was written.  - Pt to return to clinic in 1 month virtually.      Again, thank you for allowing me to participate in the care of your patient.        Sincerely,        Amos Arreola DPM

## 2020-09-23 ENCOUNTER — TELEPHONE (OUTPATIENT)
Dept: PODIATRY | Facility: CLINIC | Age: 55
End: 2020-09-23

## 2020-09-23 NOTE — TELEPHONE ENCOUNTER
9/23 Provided phone number [phone] to schedule telephone visit in about 1 month (around 10/9/2020).    Radha Sanchez   Procedure    Ortho/Sports Med/Pod/Ent/Eye/Surgical Specialties  [facility]   [phone]

## 2020-09-28 NOTE — PROGRESS NOTES
"Benita Benz is a 55 year old female who is being evaluated via a billable telephone visit.      The patient has been notified of following:     \"This telephone visit will be conducted via a call between you and your physician/provider. We have found that certain health care needs can be provided without the need for a physical exam.  This service lets us provide the care you need with a short phone conversation.  If a prescription is necessary we can send it directly to your pharmacy.  If lab work is needed we can place an order for that and you can then stop by our lab to have the test done at a later time.    Telephone visits are billed at different rates depending on your insurance coverage. During this emergency period, for some insurers they may be billed the same as an in-person visit.  Please reach out to your insurance provider with any questions.    If during the course of the call the physician/provider feels a telephone visit is not appropriate, you will not be charged for this service.\"    Patient has given verbal consent for Telephone visit?  Yes    What phone number would you like to be contacted at? [phone]    How would you like to obtain your AVS? Talat Rosales     Benita Benz is a 55 year old female who presents via phone visit today for the following health issues:    HPI    Abnormal Mood Symptoms  Onset/Duration: on and off for a while now, worst the past few months  Depression (if yes, do PHQ-9): no  Anxiety (if yes, do OVIDIO-7): YES  Accompanying Signs & Symptoms:  Still participating in activities that you used to enjoy: YES  Fatigue: YES  Irritability: YES  Difficulty concentrating: YES  Changes in appetite: no  Problems with sleep: YES  Heart racing/beating fast: YES- sometimes  Abnormally elevated, expansive, or irritable mood: YES  Persistently increased activity or energy: no  Thoughts of hurting yourself or others: no  History:  Recent stress or major life event: YES- " "Work and life in general per pt  Prior depression or anxiety: YES, just never really had it check  Family history of depression or anxiety: YES  Alcohol/drug use: no  Difficulty sleeping: YES    Therapies tried and outcome: none  PHQ 10/11/2018   PHQ-9 Total Score 1   Q9: Thoughts of better off dead/self-harm past 2 weeks Not at all     OVIDIO-7 SCORE 10/11/2018   Total Score 2     Is at work right now. Is in her car to have appointment.   Has a lot on her plate per patient. Has work stress. Cries going to work sometimes.  Feels overwhelmed.  has health issues also and is \"grouchy per patient\".  Tries to deal with things on her own per patient. Doesn't want new medications per patient because she is already on \"so much\" per patient.  goes to dialysis 3x a week. Feels like she \"cant turn off her brain\".      Therapist? Never. Is not sure how she feels about this. Will think about.     Previous medications? never    Denies suicidal or homicidal thoughts.  Patient instructed to go to the emergency room or call 911 if these occur.    Still has ambien uses prn sleep.       [place] right now.     Patient doesn't want a medication but will think about selective serotonin reuptake inhibitor. Doesn't want now.       Review of Systems   Constitutional, HEENT, cardiovascular, pulmonary, GI, , musculoskeletal, neuro, skin, endocrine and psych systems are negative, except as otherwise noted.       Objective          Vitals:  No vitals were obtained today due to virtual visit.    healthy, alert and no distress  PSYCH: Alert and oriented times 3; coherent speech, normal   rate and volume, able to articulate logical thoughts, able   to abstract reason, no tangential thoughts, no hallucinations   or delusions  Her affect is anxious and talks a lot    RESP: No cough, no audible wheezing, able to talk in full sentences  Remainder of exam unable to be completed due to telephone visits        "     Assessment/Plan:    Assessment & Plan     Anxiety  Worsening  recommended therapy and ssri  She wants to think about both  She has a lot of external stressors I think therapy would be very helpful    - MENTAL HEALTH REFERRAL  - Adult; Outpatient Treatment; Individual/Couples/Family/Group Therapy/Health Psychology; Other: [facility] [phone]; We will contact you to schedule the appointment or please call with any questions      Depression Screening Follow Up    PHQ 10/12/2020   PHQ-9 Total Score 10   Q9: Thoughts of better off dead/self-harm past 2 weeks Not at all     Last PHQ-9 10/12/2020   1.  Little interest or pleasure in doing things 1   2.  Feeling down, depressed, or hopeless 1   3.  Trouble falling or staying asleep, or sleeping too much 2   4.  Feeling tired or having little energy 2   5.  Poor appetite or overeating 1   6.  Feeling bad about yourself 2   7.  Trouble concentrating 0   8.  Moving slowly or restless 1   Q9: Thoughts of better off dead/self-harm past 2 weeks 0   PHQ-9 Total Score 10   Difficulty at work, home, or with people Very difficult       Follow Up Actions Taken  Mental Health Referral placed           No follow-ups on file.    Stella Santiago PA-C  [facility]    Phone call duration:  16 minutes

## 2020-09-30 NOTE — TELEPHONE ENCOUNTER
9/30 3rd attempt.  Provided phone number [phone] to schedule telephone visit in about 1 month (around 10/9/2020).    Radha Sanchez   Procedure    Ortho/Sports Med/Pod/Ent/Eye/Surgical Specialties  [facility]   [phone]

## 2020-10-12 ENCOUNTER — VIRTUAL VISIT (OUTPATIENT)
Dept: FAMILY MEDICINE | Facility: CLINIC | Age: 55
End: 2020-10-12
Payer: COMMERCIAL

## 2020-10-12 DIAGNOSIS — F41.9 ANXIETY: Primary | ICD-10-CM

## 2020-10-12 PROCEDURE — 96127 BRIEF EMOTIONAL/BEHAV ASSMT: CPT | Performed by: PHYSICIAN ASSISTANT

## 2020-10-12 PROCEDURE — 99214 OFFICE O/P EST MOD 30 MIN: CPT | Mod: 95 | Performed by: PHYSICIAN ASSISTANT

## 2020-10-12 ASSESSMENT — PATIENT HEALTH QUESTIONNAIRE - PHQ9
5. POOR APPETITE OR OVEREATING: MORE THAN HALF THE DAYS
SUM OF ALL RESPONSES TO PHQ QUESTIONS 1-9: 10

## 2020-10-12 ASSESSMENT — ANXIETY QUESTIONNAIRES
GAD7 TOTAL SCORE: 13
2. NOT BEING ABLE TO STOP OR CONTROL WORRYING: MORE THAN HALF THE DAYS
5. BEING SO RESTLESS THAT IT IS HARD TO SIT STILL: MORE THAN HALF THE DAYS
7. FEELING AFRAID AS IF SOMETHING AWFUL MIGHT HAPPEN: MORE THAN HALF THE DAYS
3. WORRYING TOO MUCH ABOUT DIFFERENT THINGS: MORE THAN HALF THE DAYS
IF YOU CHECKED OFF ANY PROBLEMS ON THIS QUESTIONNAIRE, HOW DIFFICULT HAVE THESE PROBLEMS MADE IT FOR YOU TO DO YOUR WORK, TAKE CARE OF THINGS AT HOME, OR GET ALONG WITH OTHER PEOPLE: EXTREMELY DIFFICULT
1. FEELING NERVOUS, ANXIOUS, OR ON EDGE: MORE THAN HALF THE DAYS
6. BECOMING EASILY ANNOYED OR IRRITABLE: SEVERAL DAYS

## 2020-10-13 ASSESSMENT — ANXIETY QUESTIONNAIRES: GAD7 TOTAL SCORE: 13

## 2020-10-28 ENCOUNTER — VIRTUAL VISIT (OUTPATIENT)
Dept: PODIATRY | Facility: CLINIC | Age: 55
End: 2020-10-28
Payer: COMMERCIAL

## 2020-10-28 DIAGNOSIS — Z53.9 ERRONEOUS ENCOUNTER--DISREGARD: Primary | ICD-10-CM

## 2020-10-28 NOTE — LETTER
10/28/2020         RE: Benita Benz  [address] [address]        Dear Colleague,    Thank you for referring your patient, Benita Benz, to the [facility]. Please see a copy of my visit note below.    Pt did not answer phone to perform visit.         Again, thank you for allowing me to participate in the care of your patient.        Sincerely,        Amos Arreola DPM

## 2020-11-25 ENCOUNTER — VIRTUAL VISIT (OUTPATIENT)
Dept: PODIATRY | Facility: CLINIC | Age: 55
End: 2020-11-25
Payer: COMMERCIAL

## 2020-11-25 DIAGNOSIS — M79.671 PAIN OF RIGHT HEEL: Primary | ICD-10-CM

## 2020-11-25 PROCEDURE — 99212 OFFICE O/P EST SF 10 MIN: CPT | Mod: 95 | Performed by: PODIATRIST

## 2020-11-25 NOTE — PROGRESS NOTES
"Benita Benz is a 55 year old female who is being evaluated via a billable telephone visit.      The patient has been notified of following:     \"This telephone visit will be conducted via a call between you and your physician/provider. We have found that certain health care needs can be provided without the need for a physical exam.  This service lets us provide the care you need with a short phone conversation.  If a prescription is necessary we can send it directly to your pharmacy.  If lab work is needed we can place an order for that and you can then stop by our lab to have the test done at a later time.    Telephone visits are billed at different rates depending on your insurance coverage. During this emergency period, for some insurers they may be billed the same as an in-person visit.  Please reach out to your insurance provider with any questions.    If during the course of the call the physician/provider feels a telephone visit is not appropriate, you will not be charged for this service.\"    Patient has given verbal consent for Telephone visit?  Yes      How would you like to obtain your AVS? Mleissahart    Phone call duration: 5 minutes    Amos Arreola DPM    Chief Complaint:   Chief Complaint   Patient presents with     RECHECK     heel pain, stilling needed to wear the boot for pain control           Allergies   Allergen Reactions     Hmg-Coa-R Inhibitors Other (See Comments)     Numbness is legs     Simvastatin      Periferal neuropathy         Subjective: Benita is a 55 year old female who presents to the clinic today for a follow up of right heel pain. She relates that she continues to have pain in the heel. She is wearing the boot. She would like an injection soon.     Objective  Data Unavailable Data Unavailable Data Unavailable Data Unavailable Data Unavailable 0 lbs 0 oz  Right heel pain.     Assessment: Right plantar fasciitis.     Plan:   - Pt seen and evaluated  - [place] [facility] [place] " to call and schedule an injection at the [facility].   - Pt to return to clinic for injection.

## 2020-11-25 NOTE — LETTER
"    11/25/2020         RE: Benita Benz  [address] [address]        Dear Colleague,    Thank you for referring your patient, Benita Benz, to the [facility]. Please see a copy of my visit note below.    Benita Benz is a 55 year old female who is being evaluated via a billable telephone visit.      The patient has been notified of following:     \"This telephone visit will be conducted via a call between you and your physician/provider. We have found that certain health care needs can be provided without the need for a physical exam.  This service lets us provide the care you need with a short phone conversation.  If a prescription is necessary we can send it directly to your pharmacy.  If lab work is needed we can place an order for that and you can then stop by our lab to have the test done at a later time.    Telephone visits are billed at different rates depending on your insurance coverage. During this emergency period, for some insurers they may be billed the same as an in-person visit.  Please reach out to your insurance provider with any questions.    If during the course of the call the physician/provider feels a telephone visit is not appropriate, you will not be charged for this service.\"    Patient has given verbal consent for Telephone visit?  Yes      How would you like to obtain your AVS? Melissahart    Phone call duration: 5 minutes    Amos Arreola DPM    Chief Complaint:   Chief Complaint   Patient presents with     RECHECK     heel pain, stilling needed to wear the boot for pain control           Allergies   Allergen Reactions     Hmg-Coa-R Inhibitors Other (See Comments)     Numbness is legs     Simvastatin      Periferal neuropathy         Subjective: Benita is a 55 year old female who presents to the clinic today for a follow up of right heel pain. She relates that she continues to have pain in the heel. She is wearing the boot. She would " like an injection soon.     Objective  Data Unavailable Data Unavailable Data Unavailable Data Unavailable Data Unavailable 0 lbs 0 oz  Right heel pain.     Assessment: Right plantar fasciitis.     Plan:   - Pt seen and evaluated  - [place] ask [place] [facility] [place] and schedule an injection [place] [facility]   - Pt to return to clinic for injection.        Again, thank you for allowing me to participate in the care of your patient.        Sincerely,        Amos Arreola DPM

## 2020-12-04 ENCOUNTER — TRANSFERRED RECORDS (OUTPATIENT)
Dept: HEALTH INFORMATION MANAGEMENT | Facility: CLINIC | Age: 55
End: 2020-12-04

## 2020-12-09 DIAGNOSIS — R42 VERTIGO: ICD-10-CM

## 2020-12-09 DIAGNOSIS — R11.0 NAUSEA: ICD-10-CM

## 2020-12-10 RX ORDER — MECLIZINE HYDROCHLORIDE 25 MG/1
TABLET ORAL
Qty: 30 TABLET | Refills: 0 | Status: SHIPPED | OUTPATIENT
Start: 2020-12-10

## 2020-12-11 ENCOUNTER — OFFICE VISIT (OUTPATIENT)
Dept: ORTHOPEDICS | Facility: CLINIC | Age: 55
End: 2020-12-11
Payer: COMMERCIAL

## 2020-12-11 DIAGNOSIS — M72.2 PLANTAR FASCIITIS: Primary | ICD-10-CM

## 2020-12-11 DIAGNOSIS — M79.671 PAIN OF RIGHT HEEL: ICD-10-CM

## 2020-12-11 PROCEDURE — 20550 NJX 1 TENDON SHEATH/LIGAMENT: CPT | Performed by: PODIATRIST

## 2020-12-11 RX ORDER — TESTOSTERONE CYPIONATE 200 MG/ML
1 INJECTION INTRAMUSCULAR
Status: DISCONTINUED | OUTPATIENT
Start: 2020-12-11 | End: 2021-05-25

## 2020-12-11 RX ORDER — LIDOCAINE HYDROCHLORIDE 10 MG/ML
1 INJECTION, SOLUTION EPIDURAL; INFILTRATION; INTRACAUDAL; PERINEURAL
Status: DISCONTINUED | OUTPATIENT
Start: 2020-12-11 | End: 2021-05-25

## 2020-12-11 RX ORDER — TRIAMCINOLONE ACETONIDE 40 MG/ML
40 INJECTION, SUSPENSION INTRA-ARTICULAR; INTRAMUSCULAR
Status: DISCONTINUED | OUTPATIENT
Start: 2020-12-11 | End: 2021-05-25

## 2020-12-11 RX ADMIN — LIDOCAINE HYDROCHLORIDE 1 ML: 10 INJECTION, SOLUTION EPIDURAL; INFILTRATION; INTRACAUDAL; PERINEURAL at 14:24

## 2020-12-11 RX ADMIN — TRIAMCINOLONE ACETONIDE 40 MG: 40 INJECTION, SUSPENSION INTRA-ARTICULAR; INTRAMUSCULAR at 14:24

## 2020-12-11 RX ADMIN — TESTOSTERONE CYPIONATE 1 ML: 200 INJECTION INTRAMUSCULAR at 14:24

## 2020-12-11 NOTE — PROGRESS NOTES
Medium Joint Injection/Arthrocentesis    Date/Time: 2020 2:24 PM  Performed by: Amos Arreola DPM  Authorized by: Amos Arreola DPM     Indications:  Pain  Needle Size:  25 G  Guidance: surface landmarks    Location comment:  Right heel  Medications:  40 mg triamcinolone 40 MG/ML; 1 mL dexamethasone 120 MG/30ML; 1 mL lidocaine (PF) 1 %  Procedure discussed: discussed risks, benefits, and alternatives    Timeout: timeout called immediately prior to procedure    Prep: patient was prepped and draped in usual sterile fashion          [facility] [address]  [address] [facility] [address]  [phone]  Dept: [phone]  ______________________________________________________________________________    Patient: Benita Benz   : 1965   MRN: [id number]   2020    INVASIVE PROCEDURE SAFETY CHECKLIST    Date: 2020   Procedure: Right heel cortisone injection  Patient Name: Benita Benz  MRN: [id number]  YOB: 1965    Action: Complete sections as appropriate. Any discrepancy results in a HARD COPY until resolved.     PRE PROCEDURE:  Patient ID verified with 2 identifiers (name and  or MRN): Yes  Procedure and site verified with patient/designee (when able): Yes  Accurate consent documentation in medical record: Yes  H&P (or appropriate assessment) documented in medical record: NA  H&P must be up to 20 days prior to procedure and updates within 24 hours of procedure as applicable: NA  Relevant diagnostic and radiology test results appropriately labeled and displayed as applicable: Yes  Procedure site(s) marked with provider initials: Yes    TIMEOUT:  Time-Out performed immediately prior to starting procedure, including verbal and active participation of all team members addressing the following:Yes  * Correct patient identify  * Confirmed that the correct side and site are marked  * An accurate  procedure consent form  * Agreement on the procedure to be done  * Correct patient position  * Relevant images and results are properly labeled and appropriately displayed  * The need to administer antibiotics or fluids for irrigation purposes during the procedure as applicable   * Safety precautions based on patient history or medication use    DURING PROCEDURE: Verification of correct person, site, and procedures any time the responsibility for care of the patient is transferred to another member of the care team.       The following medications were given:         Prior to injection, verified patient identity using patient's name and date of birth.  Due to injection administration, patient instructed to remain in clinic for 15 minutes  afterwards, and to report any adverse reaction to me immediately.    Right heel injection  Medication Name: lidocaine NDC 16970-289-62  Drug Amount Wasted:  Yes: 4 mg/ml   Vial/Syringe: Single dose vial  Expiration Date:  04/24    Medication Name Kenalog NDC [address]-5  Medication Name Dexamethasone [id number]    Scribed by Mildred Alfaro ATC for Dr. Arreola on December 11, 2020 at 1420 based on the provider's statements to me.     Mildred Alfaro ATC

## 2020-12-11 NOTE — NURSING NOTE
Reason For Visit:   Chief Complaint   Patient presents with     RECHECK     follow up right heel pain requesting injection        There were no vitals taken for this visit.    Pain Assessment  Patient Currently in Pain: Yes  0-10 Pain Scale: 5  Primary Pain Location: Foot    Mildred Alfaro ATC

## 2020-12-11 NOTE — PROGRESS NOTES
Chief Complaint:   Chief Complaint   Patient presents with     RECHECK     follow up right heel pain requesting injection           Allergies   Allergen Reactions     Hmg-Coa-R Inhibitors Other (See Comments)     Numbness is legs     Simvastatin      Periferal neuropathy         Subjective: Benita is a 55 year old female who presents to the clinic today for a follow up of right heel pain.  Nature: sharp    Location: right plantar heel    Duration: months    Onset: gradual. No inciting trauma    Course: worsening    Aggravating/alleviating factors: + post-static dyskinesia.     Previous Treatments: RICE, stretching, CAM.       Objective  Data Unavailable Data Unavailable Data Unavailable Data Unavailable Data Unavailable 0 lbs 0 oz  DP and PT pulses 2/4 on the right. CRT is instant. + Pedal hair.   Gross sensation is intact on the right.   Pain noted today with palpation of the medial attachment of the plantar fascia on the right heel. No pain in the medial band of the plantar fascia. No pain noted along the courses of the PT, peroneal, or Achilles tendons BL. No pain with lateral calc squeeze. No pain with STJ ROM.     No x-rays indicated during today's visit  Previous films were reviewed today, independent visualization of images was performed, and results were discussed with the patient        Assessment: Benita is a 54 YO with right plantar fasciitis.     Plan:   - Pt seen and evaluated  - She would like a steroid injection today. She last had one four years ago. I discussed the risks, complications, benefits, alternatives and answered all her questions. Consent was signed. All questions answered. After skin prep, an injection consisting of 3cc's of a 1:1 mix of 1% lidocaine plain + Kenalog-40 + dexamethasone 4mg was injected into the left heel. She tolerated this well with no complications.   - Pt to return to clinic PRN

## 2020-12-11 NOTE — LETTER
12/11/2020         RE: Benita Benz  [address] [facility] [address]        Dear Colleague,    Thank you for referring your patient, Benita Benz, to the [facility] [place]. Please see a copy of my visit note below.    Chief Complaint:   Chief Complaint   Patient presents with     RECHECK     follow up right heel pain requesting injection           Allergies   Allergen Reactions     Hmg-Coa-R Inhibitors Other (See Comments)     Numbness is legs     Simvastatin      Periferal neuropathy         Subjective: Benita is a 55 year old female who presents to the clinic today for a follow up of right heel pain.  Nature: sharp    Location: right plantar heel    Duration: months    Onset: gradual. No inciting trauma    Course: worsening    Aggravating/alleviating factors: + post-static dyskinesia.     Previous Treatments: RICE, stretching, CAM.       Objective  Data Unavailable Data Unavailable Data Unavailable Data Unavailable Data Unavailable 0 lbs 0 oz  DP and PT pulses 2/4 on the right. CRT is instant. + Pedal hair.   Gross sensation is intact on the right.   Pain noted today with palpation of the medial attachment of the plantar fascia on the right heel. No pain in the medial band of the plantar fascia. No pain noted along the courses of the PT, peroneal, or Achilles tendons BL. No pain with lateral calc squeeze. No pain with STJ ROM.     No x-rays indicated during today's visit  Previous films were reviewed today, independent visualization of images was performed, and results were discussed with the patient        Assessment: Benita is a 56 YO with right plantar fasciitis.     Plan:   - Pt seen and evaluated  - She would like a steroid injection today. She last had one four years ago. I discussed the risks, complications, benefits, alternatives and answered all her questions. Consent was signed. All questions answered. After skin prep, an injection consisting of 3cc's of a  1:1 mix of 1% lidocaine plain + Kenalog-40 + dexamethasone 4mg was injected into the left heel. She tolerated this well with no complications.   - Pt to return to clinic PRN      Medium Joint Injection/Arthrocentesis    Date/Time: 2020 2:24 PM  Performed by: Amos Arreola DPM  Authorized by: Amos Arreola DPM     Indications:  Pain  Needle Size:  25 G  Guidance: surface landmarks    Location comment:  Right heel  Medications:  40 mg triamcinolone 40 MG/ML; 1 mL dexamethasone 120 MG/30ML; 1 mL lidocaine (PF) 1 %  Procedure discussed: discussed risks, benefits, and alternatives    Timeout: timeout called immediately prior to procedure    Prep: patient was prepped and draped in usual sterile fashion          [facility] [address] [address]  [phone]  Dept: [phone]  ______________________________________________________________________________    Patient: Benita Benz   : 1965   MRN: [id number]   2020    INVASIVE PROCEDURE SAFETY CHECKLIST    Date: 2020   Procedure: Right heel cortisone injection  Patient Name: Benita Benz  MRN: [id number]  YOB: 1965    Action: Complete sections as appropriate. Any discrepancy results in a HARD COPY until resolved.     PRE PROCEDURE:  Patient ID verified with 2 identifiers (name and  or MRN): Yes  Procedure and site verified with patient/designee (when able): Yes  Accurate consent documentation in medical record: Yes  H&P (or appropriate assessment) documented in medical record: NA  H&P must be up to 20 days prior to procedure and updates within 24 hours of procedure as applicable: NA  Relevant diagnostic and radiology test results appropriately labeled and displayed as applicable: Yes  Procedure site(s) marked with provider initials: Yes    TIMEOUT:  Time-Out performed immediately prior to starting procedure, including verbal  and active participation of all team members addressing the following:Yes  * Correct patient identify  * Confirmed that the correct side and site are marked  * An accurate procedure consent form  * Agreement on the procedure to be done  * Correct patient position  * Relevant images and results are properly labeled and appropriately displayed  * The need to administer antibiotics or fluids for irrigation purposes during the procedure as applicable   * Safety precautions based on patient history or medication use    DURING PROCEDURE: Verification of correct person, site, and procedures any time the responsibility for care of the patient is transferred to another member of the care team.       The following medications were given:         Prior to injection, verified patient identity using patient's name and date of birth.  Due to injection administration, patient instructed to remain in clinic for 15 minutes  afterwards, and to report any adverse reaction to me immediately.    Right heel injection  Medication Name: lidocaine NDC 69134-637-47  Drug Amount Wasted:  Yes: 4 mg/ml   Vial/Syringe: Single dose vial  Expiration Date:  04/24    Medication Name Kenalog NDC [address]-9  Medication Name Dexamethasone [id number]    Scribed by Mildred Alfaro ATC for Dr. Arreola on December 11, 2020 at 1420 based on the provider's statements to me.     FRANCISCO Gupta DPM

## 2020-12-27 ENCOUNTER — HEALTH MAINTENANCE LETTER (OUTPATIENT)
Age: 55
End: 2020-12-27

## 2021-01-30 DIAGNOSIS — K21.9 GASTROESOPHAGEAL REFLUX DISEASE WITHOUT ESOPHAGITIS: ICD-10-CM

## 2021-01-30 DIAGNOSIS — E78.5 DYSLIPIDEMIA WITH HIGH LDL AND LOW HDL: ICD-10-CM

## 2021-01-30 DIAGNOSIS — I10 BENIGN ESSENTIAL HYPERTENSION: ICD-10-CM

## 2021-02-01 RX ORDER — LISINOPRIL 40 MG/1
TABLET ORAL
Qty: 90 TABLET | Refills: 1 | Status: SHIPPED | OUTPATIENT
Start: 2021-02-01 | End: 2021-05-25

## 2021-02-01 RX ORDER — PRAVASTATIN SODIUM 40 MG
TABLET ORAL
Qty: 90 TABLET | Refills: 1 | Status: SHIPPED | OUTPATIENT
Start: 2021-02-01 | End: 2021-05-25

## 2021-03-26 ENCOUNTER — IMMUNIZATION (OUTPATIENT)
Dept: NURSING | Facility: CLINIC | Age: 56
End: 2021-03-26
Payer: COMMERCIAL

## 2021-03-26 PROCEDURE — 0001A PR COVID VAC PFIZER DIL RECON 30 MCG/0.3 ML IM: CPT

## 2021-03-26 PROCEDURE — 91300 PR COVID VAC PFIZER DIL RECON 30 MCG/0.3 ML IM: CPT

## 2021-04-16 ENCOUNTER — IMMUNIZATION (OUTPATIENT)
Dept: NURSING | Facility: CLINIC | Age: 56
End: 2021-04-16
Attending: FAMILY MEDICINE
Payer: COMMERCIAL

## 2021-04-16 PROCEDURE — 0002A PR COVID VAC PFIZER DIL RECON 30 MCG/0.3 ML IM: CPT

## 2021-04-16 PROCEDURE — 91300 PR COVID VAC PFIZER DIL RECON 30 MCG/0.3 ML IM: CPT

## 2021-04-19 DIAGNOSIS — J45.20 MILD INTERMITTENT ASTHMA WITHOUT COMPLICATION: ICD-10-CM

## 2021-04-19 NOTE — TELEPHONE ENCOUNTER
Routing refill request to provider for review/approval because:  ACT and visit fail protocol.    Morenita Allen RN, [facility] Triage

## 2021-04-20 RX ORDER — ALBUTEROL SULFATE 90 UG/1
2 AEROSOL, METERED RESPIRATORY (INHALATION) EVERY 6 HOURS PRN
Qty: 18 G | Refills: 0 | OUTPATIENT
Start: 2021-04-20

## 2021-04-20 NOTE — TELEPHONE ENCOUNTER
If makes appt can get 1 month inhalers. In person or video only no telephone. In person preferred so I can listen to lungs. Stella Santiago PA-C

## 2021-04-28 ENCOUNTER — TRANSFERRED RECORDS (OUTPATIENT)
Dept: HEALTH INFORMATION MANAGEMENT | Facility: CLINIC | Age: 56
End: 2021-04-28

## 2021-05-14 ENCOUNTER — TRANSFERRED RECORDS (OUTPATIENT)
Dept: HEALTH INFORMATION MANAGEMENT | Facility: CLINIC | Age: 56
End: 2021-05-14

## 2021-05-20 ENCOUNTER — ANCILLARY PROCEDURE (OUTPATIENT)
Dept: MAMMOGRAPHY | Facility: CLINIC | Age: 56
End: 2021-05-20
Payer: COMMERCIAL

## 2021-05-20 DIAGNOSIS — Z12.31 VISIT FOR SCREENING MAMMOGRAM: ICD-10-CM

## 2021-05-20 PROCEDURE — 77063 BREAST TOMOSYNTHESIS BI: CPT | Mod: TC | Performed by: RADIOLOGY

## 2021-05-20 PROCEDURE — 77067 SCR MAMMO BI INCL CAD: CPT | Mod: TC | Performed by: RADIOLOGY

## 2021-05-20 NOTE — PROGRESS NOTES
SUBJECTIVE:   CC: Benita Benz is an 56 year old woman who presents for preventive health visit.     Patient has been advised of split billing requirements and indicates understanding: Yes     Healthy Habits:    Pt is not fasting and did not have labs completed. Not fasting.     Per pt PAP is due. No abnormal's in the past few years.     Up to date on mammogram.     colonoscopy in 2015.         Answers for HPI/ROS submitted by the patient on 5/25/2021   Annual Exam:  Frequency of exercise:: 1 day/week  Getting at least 3 servings of Calcium per day:: Yes  Diet:: Low salt, Low fat/cholesterol, Vegetarian/vegan, Gluten-free/reduced  Taking medications regularly:: Yes  Medication side effects:: Other  Bi-annual eye exam:: Yes  Dental care twice a year:: NO  Sleep apnea or symptoms of sleep apnea:: None  abdominal pain: Yes  Blood in stool: No  Blood in urine: No  chest pain: No  chills: No  congestion: No  constipation: Yes  cough: No  diarrhea: No  dizziness: No  ear pain: No  eye pain: No  nervous/anxious: No  fever: No  frequency: Yes  genital sores: No  headaches: No  hearing loss: No  heartburn: Yes  arthralgias: Yes  joint swelling: No  peripheral edema: No  mood changes: No  myalgias: Yes  nausea: No  dysuria: No  palpitations: No  Skin sensation changes: No  sore throat: No  urgency: No  rash: No  shortness of breath: No  visual disturbance: Yes  weakness: Yes  pelvic pain: No  vaginal bleeding: No  vaginal discharge: No  tenderness: Yes  breast mass: No  breast discharge: No  Additional concerns today:: Yes  -Discuss R side sharp abdominal pain on and off for a while now.  Not daily. Only if stretching or bending over. No palpable lump.  Feels cramping.   Has had this for a long time. Not getting better. Not acutely worsening. No fevers.   It is tender today.     -R knee pain still there per pt. Wondering if she can have it looked at. Unsure if had imaging done before. More pain in popliteal area. For a  long time.     R shoulder-has full thickness tear per patient was told to get surgery. Will wait until her  completes kidney transplant per patient.         HTN--doing well on medications. Denies suicidal or homicidal thoughts.  Patient instructed to go to the emergency room or call 911 if these occur.  Statin-tolerating well. Not fasting today will get nonfasting labs.     Sleep-ambien prn. Sparingly. Doesn't need refill right now.     gerd-stable on ppi. No blood in stools.     Duration of exercise:: Less than 15 minutes      Today's PHQ-2 Score:   PHQ-2 ( 1999 [employer]) 5/25/2021 5/25/2021   Q1: Little interest or pleasure in doing things 1 0   Q2: Feeling down, depressed or hopeless 0 0   PHQ-2 Score 1 0   Q1: Little interest or pleasure in doing things Several days -   Q2: Feeling down, depressed or hopeless Not at all -   PHQ-2 Score 1 -       Abuse: Current or Past(Physical, Sexual or Emotional)- No  Do you feel safe in your environment? Yes        Social History     Tobacco Use     Smoking status: Former Smoker     Smokeless tobacco: Never Used   Substance Use Topics     Alcohol use: Yes     Comment: rare     If you drink alcohol do you typically have >3 drinks per day or >7 drinks per week? No                     Reviewed orders with patient.  Reviewed health maintenance and updated orders accordingly - Yes  Lab work is in process  Labs reviewed in EPIC  BP Readings from Last 3 Encounters:   05/25/21 127/85   08/04/20 116/66   07/22/20 122/81    Wt Readings from Last 3 Encounters:   05/25/21 91.6 kg (202 lb)   08/04/20 88 kg (194 lb)   01/07/20 85.7 kg (189 lb)                  Patient Active Problem List   Diagnosis     Benign essential hypertension     Mild intermittent asthma without complication     Primary insomnia     Dysmenorrhea     Episodic tension-type headache, not intractable     Plantar fasciitis of left foot     Pain of left heel     Low back pain, unspecified back pain laterality,  unspecified chronicity, unspecified whether sciatica present     Advanced directives, counseling/discussion     Past Surgical History:   Procedure Laterality Date     EYE SURGERY  2014    toe surgery       Social History     Tobacco Use     Smoking status: Former Smoker     Smokeless tobacco: Never Used   Substance Use Topics     Alcohol use: Yes     Comment: rare     Family History   Problem Relation Age of Onset     Diabetes Mother      Thyroid Disease Mother      Sarcoidosis Mother          Current Outpatient Medications   Medication Sig Dispense Refill     albuterol (PROAIR HFA/PROVENTIL HFA/VENTOLIN HFA) 108 (90 Base) MCG/ACT inhaler Inhale 2 puffs into the lungs every 6 hours as needed for shortness of breath / dyspnea or wheezing 18 g 0     amLODIPine (NORVASC) 10 MG tablet Take 1 tablet (10 mg) by mouth daily 90 tablet 3     diclofenac (VOLTAREN) 1 % topical gel Apply 2 grams to hands four times daily using enclosed dosing card. (Patient taking differently: as needed Apply 2 grams to hands four times daily using enclosed dosing card.) 100 g 1     Flaxseed, Linseed, (FLAX SEED OIL) 1000 MG capsule Take 1 capsule by mouth as needed        lisinopril (ZESTRIL) 40 MG tablet Take 1 tablet (40 mg) by mouth daily 90 tablet 3     meclizine (ANTIVERT) 25 MG tablet TAKE 1 TABLET BY MOUTH EVERY 6 HOURS AS NEEDED FOR DIZZINESS 30 tablet 0     methocarbamol (ROBAXIN) 500 MG tablet Take 500 mg by mouth as needed        Multiple Vitamins-Minerals (MULTIVITAMIN ADULT PO)        Omega-3 Fatty Acids (OMEGA-3 FISH OIL PO)        omeprazole (PRILOSEC) 20 MG DR capsule Take 1 capsule by mouth once daily 90 capsule 1     order for DME Equipment being ordered: [id number]   Walking Boot, [employer], MED 1 Device 0     pravastatin (PRAVACHOL) 40 MG tablet Take 1 tablet (40 mg) by mouth daily 90 tablet 3     triamcinolone (KENALOG) 0.1 % cream Apply sparingly to affected area three times daily as needed for up to 14 days 45 g 0      zolpidem (AMBIEN) 10 MG tablet Take 1 tablet (10 mg) by mouth nightly as needed for sleep 30 tablet 5     Allergies   Allergen Reactions     Hmg-Coa-R Inhibitors Other (See Comments)     Numbness is legs     Simvastatin      Periferal neuropathy       FSH-7:   Breast CA Risk Assessment (FHS-7) 2021   Did any of your first-degree relatives have breast or ovarian cancer? Yes   Did any of your relatives have bilateral breast cancer? Unknown       Pertinent mammograms are reviewed under the imaging tab.    Pertinent mammograms are reviewed under the imaging tab.  History of abnormal Pap smear: NO - age 30-65 PAP every 5 years with negative HPV co-testing recommended  PAP / HPV Latest Ref Rng & Units 1/10/2017   PAP - NIL   HPV 16 DNA NEG Negative   HPV 18 DNA NEG Negative   OTHER HR HPV NEG Negative     Reviewed and updated as needed this visit by clinical staff  Tobacco  Allergies  Meds   Med Hx  Surg Hx  Fam Hx  Soc Hx        Reviewed and updated as needed this visit by Provider                Past Medical History:   Diagnosis Date     High cholesterol      Hypertension      Uncomplicated asthma       Past Surgical History:   Procedure Laterality Date     EYE SURGERY      toe surgery     OB History    Para Term  AB Living   4 4 0 0 0 0   SAB TAB Ectopic Multiple Live Births   0 0 0 0 0      # Outcome Date GA Lbr Panchito/2nd Weight Sex Delivery Anes PTL Lv   4 Para            3 Para            2 Para            1 Para                ROS:  CONSTITUTIONAL: NEGATIVE for fever, chills, change in weight  INTEGUMENTARY/SKIN: NEGATIVE for worrisome rashes, moles or lesions  EYES: NEGATIVE for vision changes or irritation  ENT: NEGATIVE for ear, mouth and throat problems  RESP: NEGATIVE for significant cough or SOB  BREAST: NEGATIVE for masses, tenderness or discharge  CV: NEGATIVE for chest pain, palpitations or peripheral edema  : NEGATIVE for unusual urinary or vaginal symptoms. No vaginal  "bleeding.  MUSCULOSKELETAL: NEGATIVE for significant arthralgias or myalgia  NEURO: NEGATIVE for weakness, dizziness or paresthesias  PSYCHIATRIC: NEGATIVE for changes in mood or affect     OBJECTIVE:   /85   Pulse 69   Temp 98.2  F (36.8  C) (Tympanic)   Resp 16   Ht 1.753 m (5' 9\")   Wt 91.6 kg (202 lb)   LMP 04/08/2021 (Approximate)   SpO2 100%   Breastfeeding No   BMI 29.83 kg/m    EXAM:  GENERAL: alert, no distress and over weight  EYES: Eyes grossly normal to inspection, PERRL and conjunctivae and sclerae normal  HENT: ear canals and TM's normal, nose and mouth without ulcers or lesions  NECK: no adenopathy, no asymmetry, masses, or scars and thyroid normal to palpation  RESP: lungs clear to auscultation - no rales, rhonchi or wheezes  BREAST: normal without masses, tenderness or nipple discharge and no palpable axillary masses or adenopathy  CV: regular rate and rhythm, normal S1 S2, no S3 or S4, no murmur, click or rub, no peripheral edema and peripheral pulses strong  ABDOMEN: soft, tender RLQ difficult to feel possible hernia here?, without hepatosplenomegaly     (female): normal female external genitalia, normal urethral meatus, vaginal mucosa pink, moist, well rugated, and normal cervix without masses or discharge  MS: no gross musculoskeletal defects noted, no edema  SKIN: no suspicious lesions or rashes  NEURO: Normal strength and tone, mentation intact and speech normal  PSYCH: mentation appears normal, affect normal/bright      ASSESSMENT/PLAN:   1. Routine general medical examination at a health care facility      2. Chronic pain of right knee  Did not have time to address this much today  Has been going on a long time will refer  ? Imaging/injuection or physical therapy    - Orthopedic & Spine  Referral; Future    3. Mild intermittent asthma without complication  Doing well  - albuterol (PROAIR HFA/PROVENTIL HFA/VENTOLIN HFA) 108 (90 Base) MCG/ACT inhaler; Inhale 2 puffs " "into the lungs every 6 hours as needed for shortness of breath / dyspnea or wheezing  Dispense: 18 g; Refill: 0    4. Benign essential hypertension  stable  - amLODIPine (NORVASC) 10 MG tablet; Take 1 tablet (10 mg) by mouth daily  Dispense: 90 tablet; Refill: 3  - lisinopril (ZESTRIL) 40 MG tablet; Take 1 tablet (40 mg) by mouth daily  Dispense: 90 tablet; Refill: 3  - Comprehensive metabolic panel (BMP + Alb, Alk Phos, ALT, AST, Total. Bili, TP)    5. Dyslipidemia with high LDL and low HDL  Will get labs updated today    - Lipid panel reflex to direct LDL Non-fasting  - pravastatin (PRAVACHOL) 40 MG tablet; Take 1 tablet (40 mg) by mouth daily  Dispense: 90 tablet; Refill: 3    6. Primary insomnia  Doing well, if needs 1 fill of ambien in next 12 months ok to fill this    7. RLQ abdominal pain  ? Hernia or other  To emergency room with worsening symptoms or fever  - GENERAL SURG ADULT REFERRAL; Future  - CBC with platelets and differential  - Comprehensive metabolic panel (BMP + Alb, Alk Phos, ALT, AST, Total. Bili, TP)    Patient has been advised of split billing requirements and indicates understanding: Yes  COUNSELING:   Reviewed preventive health counseling, as reflected in patient instructions       Regular exercise       Healthy diet/nutrition       Vision screening       Hearing screening    Estimated body mass index is 29.83 kg/m  as calculated from the following:    Height as of this encounter: 1.753 m (5' 9\").    Weight as of this encounter: 91.6 kg (202 lb).    Weight management plan: Discussed healthy diet and exercise guidelines    She reports that she has quit smoking. She has never used smokeless tobacco.    Patient Instructions     Preventive Health Recommendations  Female Ages 50 - 64    Yearly exam: See your health care provider every year in order to  o Review health changes.   o Discuss preventive care.    o Review your medicines if your doctor has prescribed any.      Get a Pap test every " three years (unless you have an abnormal result and your provider advises testing more often).    If you get Pap tests with HPV test, you only need to test every 5 years, unless you have an abnormal result.     You do not need a Pap test if your uterus was removed (hysterectomy) and you have not had cancer.    You should be tested each year for STDs (sexually transmitted diseases) if you're at risk.     Have a mammogram every 1 to 2 years.    Have a colonoscopy at age 50, or have a yearly FIT test (stool test). These exams screen for colon cancer.      Have a cholesterol test every 5 years, or more often if advised.    Have a diabetes test (fasting glucose) every three years. If you are at risk for diabetes, you should have this test more often.     If you are at risk for osteoporosis (brittle bone disease), think about having a bone density scan (DEXA).    Shots: Get a flu shot each year. Get a tetanus shot every 10 years.    Nutrition:     Eat at least 5 servings of fruits and vegetables each day.    Eat whole-grain bread, whole-wheat pasta and brown rice instead of white grains and rice.    Get adequate Calcium and Vitamin D.     Lifestyle    Exercise at least 150 minutes a week (30 minutes a day, 5 days a week). This will help you control your weight and prevent disease.    Limit alcohol to one drink per day.    No smoking.     Wear sunscreen to prevent skin cancer.     See your dentist every six months for an exam and cleaning.    See your eye doctor every 1 to 2 years.        Counseling Resources:  ATP IV Guidelines  Pooled Cohorts Equation Calculator  Breast Cancer Risk Calculator  BRCA-Related Cancer Risk Assessment: FHS-7 Tool  FRAX Risk Assessment  ICSI Preventive Guidelines  Dietary Guidelines for Americans, 2010  USDA's MyPlate  ASA Prophylaxis  Lung CA Screening    DAVID Mancia [facility]

## 2021-05-25 ENCOUNTER — OFFICE VISIT (OUTPATIENT)
Dept: FAMILY MEDICINE | Facility: CLINIC | Age: 56
End: 2021-05-25
Payer: COMMERCIAL

## 2021-05-25 VITALS
OXYGEN SATURATION: 100 % | HEIGHT: 69 IN | SYSTOLIC BLOOD PRESSURE: 127 MMHG | WEIGHT: 202 LBS | RESPIRATION RATE: 16 BRPM | HEART RATE: 69 BPM | DIASTOLIC BLOOD PRESSURE: 85 MMHG | BODY MASS INDEX: 29.92 KG/M2 | TEMPERATURE: 98.2 F

## 2021-05-25 DIAGNOSIS — M25.561 CHRONIC PAIN OF RIGHT KNEE: ICD-10-CM

## 2021-05-25 DIAGNOSIS — R10.31 RLQ ABDOMINAL PAIN: ICD-10-CM

## 2021-05-25 DIAGNOSIS — Z00.00 ROUTINE GENERAL MEDICAL EXAMINATION AT A HEALTH CARE FACILITY: Primary | ICD-10-CM

## 2021-05-25 DIAGNOSIS — I10 BENIGN ESSENTIAL HYPERTENSION: ICD-10-CM

## 2021-05-25 DIAGNOSIS — F51.01 PRIMARY INSOMNIA: ICD-10-CM

## 2021-05-25 DIAGNOSIS — G89.29 CHRONIC PAIN OF RIGHT KNEE: ICD-10-CM

## 2021-05-25 DIAGNOSIS — E78.5 DYSLIPIDEMIA WITH HIGH LDL AND LOW HDL: ICD-10-CM

## 2021-05-25 DIAGNOSIS — J45.20 MILD INTERMITTENT ASTHMA WITHOUT COMPLICATION: ICD-10-CM

## 2021-05-25 LAB
ALBUMIN SERPL-MCNC: 3.8 G/DL (ref 3.4–5)
ALP SERPL-CCNC: 64 U/L (ref 40–150)
ALT SERPL W P-5'-P-CCNC: 19 U/L (ref 0–50)
ANION GAP SERPL CALCULATED.3IONS-SCNC: 6 MMOL/L (ref 3–14)
AST SERPL W P-5'-P-CCNC: 14 U/L (ref 0–45)
BASOPHILS # BLD AUTO: 0 10E9/L (ref 0–0.2)
BASOPHILS NFR BLD AUTO: 0.4 %
BILIRUB SERPL-MCNC: 0.3 MG/DL (ref 0.2–1.3)
BUN SERPL-MCNC: 17 MG/DL (ref 7–30)
CALCIUM SERPL-MCNC: 9.4 MG/DL (ref 8.5–10.1)
CHLORIDE SERPL-SCNC: 106 MMOL/L (ref 94–109)
CHOLEST SERPL-MCNC: 236 MG/DL
CO2 SERPL-SCNC: 28 MMOL/L (ref 20–32)
CREAT SERPL-MCNC: 1.01 MG/DL (ref 0.52–1.04)
DIFFERENTIAL METHOD BLD: NORMAL
EOSINOPHIL # BLD AUTO: 0.2 10E9/L (ref 0–0.7)
EOSINOPHIL NFR BLD AUTO: 1.7 %
ERYTHROCYTE [DISTWIDTH] IN BLOOD BY AUTOMATED COUNT: 14.7 % (ref 10–15)
GFR SERPL CREATININE-BSD FRML MDRD: 62 ML/MIN/{1.73_M2}
GLUCOSE SERPL-MCNC: 90 MG/DL (ref 70–99)
HCT VFR BLD AUTO: 38.4 % (ref 35–47)
HDLC SERPL-MCNC: 61 MG/DL
HGB BLD-MCNC: 12.4 G/DL (ref 11.7–15.7)
LDLC SERPL CALC-MCNC: 149 MG/DL
LYMPHOCYTES # BLD AUTO: 4 10E9/L (ref 0.8–5.3)
LYMPHOCYTES NFR BLD AUTO: 39.9 %
MCH RBC QN AUTO: 28.7 PG (ref 26.5–33)
MCHC RBC AUTO-ENTMCNC: 32.3 G/DL (ref 31.5–36.5)
MCV RBC AUTO: 89 FL (ref 78–100)
MONOCYTES # BLD AUTO: 0.9 10E9/L (ref 0–1.3)
MONOCYTES NFR BLD AUTO: 8.5 %
NEUTROPHILS # BLD AUTO: 5 10E9/L (ref 1.6–8.3)
NEUTROPHILS NFR BLD AUTO: 49.5 %
NONHDLC SERPL-MCNC: 175 MG/DL
PLATELET # BLD AUTO: 358 10E9/L (ref 150–450)
POTASSIUM SERPL-SCNC: 4.7 MMOL/L (ref 3.4–5.3)
PROT SERPL-MCNC: 7.8 G/DL (ref 6.8–8.8)
RBC # BLD AUTO: 4.32 10E12/L (ref 3.8–5.2)
SODIUM SERPL-SCNC: 140 MMOL/L (ref 133–144)
TRIGL SERPL-MCNC: 129 MG/DL
WBC # BLD AUTO: 10.1 10E9/L (ref 4–11)

## 2021-05-25 PROCEDURE — 99396 PREV VISIT EST AGE 40-64: CPT | Performed by: PHYSICIAN ASSISTANT

## 2021-05-25 PROCEDURE — 87624 HPV HI-RISK TYP POOLED RSLT: CPT | Performed by: PHYSICIAN ASSISTANT

## 2021-05-25 PROCEDURE — 80053 COMPREHEN METABOLIC PANEL: CPT | Performed by: PHYSICIAN ASSISTANT

## 2021-05-25 PROCEDURE — 80061 LIPID PANEL: CPT | Performed by: PHYSICIAN ASSISTANT

## 2021-05-25 PROCEDURE — 85025 COMPLETE CBC W/AUTO DIFF WBC: CPT | Performed by: PHYSICIAN ASSISTANT

## 2021-05-25 PROCEDURE — 99214 OFFICE O/P EST MOD 30 MIN: CPT | Mod: 25 | Performed by: PHYSICIAN ASSISTANT

## 2021-05-25 PROCEDURE — 36415 COLL VENOUS BLD VENIPUNCTURE: CPT | Performed by: PHYSICIAN ASSISTANT

## 2021-05-25 PROCEDURE — G0145 SCR C/V CYTO,THINLAYER,RESCR: HCPCS | Performed by: PHYSICIAN ASSISTANT

## 2021-05-25 RX ORDER — LISINOPRIL 40 MG/1
40 TABLET ORAL DAILY
Qty: 90 TABLET | Refills: 3 | Status: SHIPPED | OUTPATIENT
Start: 2021-05-25 | End: 2022-09-13

## 2021-05-25 RX ORDER — ALBUTEROL SULFATE 90 UG/1
2 AEROSOL, METERED RESPIRATORY (INHALATION) EVERY 6 HOURS PRN
Qty: 18 G | Refills: 0 | Status: SHIPPED | OUTPATIENT
Start: 2021-05-25 | End: 2021-10-07

## 2021-05-25 RX ORDER — AMLODIPINE BESYLATE 10 MG/1
10 TABLET ORAL DAILY
Qty: 90 TABLET | Refills: 3 | Status: SHIPPED | OUTPATIENT
Start: 2021-05-25 | End: 2022-09-13

## 2021-05-25 RX ORDER — PRAVASTATIN SODIUM 40 MG
40 TABLET ORAL DAILY
Qty: 90 TABLET | Refills: 3 | Status: SHIPPED | OUTPATIENT
Start: 2021-05-25 | End: 2022-09-13

## 2021-05-25 ASSESSMENT — ENCOUNTER SYMPTOMS
CHILLS: 0
SORE THROAT: 0
DIZZINESS: 0
COUGH: 0
HEARTBURN: 1
MYALGIAS: 1
PARESTHESIAS: 0
FREQUENCY: 1
BREAST MASS: 0
PALPITATIONS: 0
JOINT SWELLING: 0
NERVOUS/ANXIOUS: 0
SHORTNESS OF BREATH: 0
ARTHRALGIAS: 1
ABDOMINAL PAIN: 1
FEVER: 0
NAUSEA: 0
DIARRHEA: 0
EYE PAIN: 0
HEADACHES: 0
CONSTIPATION: 1
HEMATURIA: 0
DYSURIA: 0
HEMATOCHEZIA: 0
WEAKNESS: 1

## 2021-05-25 ASSESSMENT — ANXIETY QUESTIONNAIRES
6. BECOMING EASILY ANNOYED OR IRRITABLE: NOT AT ALL
GAD7 TOTAL SCORE: 0
1. FEELING NERVOUS, ANXIOUS, OR ON EDGE: NOT AT ALL
IF YOU CHECKED OFF ANY PROBLEMS ON THIS QUESTIONNAIRE, HOW DIFFICULT HAVE THESE PROBLEMS MADE IT FOR YOU TO DO YOUR WORK, TAKE CARE OF THINGS AT HOME, OR GET ALONG WITH OTHER PEOPLE: NOT DIFFICULT AT ALL
7. FEELING AFRAID AS IF SOMETHING AWFUL MIGHT HAPPEN: NOT AT ALL
3. WORRYING TOO MUCH ABOUT DIFFERENT THINGS: NOT AT ALL
2. NOT BEING ABLE TO STOP OR CONTROL WORRYING: NOT AT ALL
5. BEING SO RESTLESS THAT IT IS HARD TO SIT STILL: NOT AT ALL

## 2021-05-25 ASSESSMENT — PATIENT HEALTH QUESTIONNAIRE - PHQ9
5. POOR APPETITE OR OVEREATING: NOT AT ALL
SUM OF ALL RESPONSES TO PHQ QUESTIONS 1-9: 0

## 2021-05-25 ASSESSMENT — MIFFLIN-ST. JEOR: SCORE: 1570.65

## 2021-05-26 ASSESSMENT — ASTHMA QUESTIONNAIRES: ACT_TOTALSCORE: 25

## 2021-05-26 ASSESSMENT — ANXIETY QUESTIONNAIRES: GAD7 TOTAL SCORE: 0

## 2021-05-28 LAB
COPATH REPORT: NORMAL
PAP: NORMAL

## 2021-05-28 NOTE — RESULT ENCOUNTER NOTE
Nia Joy,       Your recent test results are attached, if you have any questions or concerns please feel free to contact me via e-mail or call [phone].  White and red blood cell counts normal.  No anemia.  Sodium, potassium, kidney and liver function normal.  Glucose (blood sugar) normal. Cholesterol has worsened some. Lets recheck in 6 months and if still high we will change to a stronger cholesterol medication or increase your dose of the current one. Please make a f/u with me in person fasting to have this discuss in about 6 months.          It was a pleasure to see you at your recent office visit.      Sincerely,  Stella Santiago PA-C

## 2021-06-01 LAB
FINAL DIAGNOSIS: NORMAL
HPV HR 12 DNA CVX QL NAA+PROBE: NEGATIVE
HPV16 DNA SPEC QL NAA+PROBE: NEGATIVE
HPV18 DNA SPEC QL NAA+PROBE: NEGATIVE
SPECIMEN DESCRIPTION: NORMAL
SPECIMEN SOURCE CVX/VAG CYTO: NORMAL

## 2021-07-30 NOTE — PROGRESS NOTES
Assessment & Plan     Acute pain of left shoulder  Acute on chronic sounds like from history  Bursitis, tendonitis, small or other tears in differential  To ortho for exam, ? Injection or other recommendations    - XR Shoulder Left G/E 3 Views; Future  - Orthopedic  Referral; Future        No follow-ups on file.    [facility]ID [facility]    Bobby Joy is a 56 year old who presents for the following health issues  accompanied by her Self:    HPI     Grandson is with today.      Musculoskeletal problem/pain  Onset/Duration: on and off for a while now.  Description  Location: Shoulder - left  Joint Swelling: no  Redness: no  Pain: YES  Warmth: no  Intensity:  moderate  Progression of Symptoms:  worsening and same  Accompanying signs and symptoms:   Fevers: no  Numbness/tingling/weakness: no  History  Trauma to the area: no  Recent illness:  no  Previous similar problem: YES  Previous evaluation:  no  Precipitating or alleviating factors:  Aggravating factors include: lifting, exercise, overuse and Laying on it.  Therapies tried and outcome: rest/inactivity and ice    R shoulder-has full thickness tear per patient was told to get surgery. 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  This left shoulder pain may have happened/started at work but not claiming work comp for it no longer works for that company.  She is feeling it start in her left shoulder as she did in her right so she just wants to prevent it from worsening like her other shoulder.    R shoulder injection helped for 3 months.  Starting to wear off.    Did do some physical therapy on her own for left shoulder when she was seen for right.     Pain off and on, overall worse.    Pain mostly anteriorly. Worse when laying on it. Pain  only with certain movements such as overhead.  No known injury. Lifts a lot with her  being home.        Review of Systems   Constitutional, HEENT, cardiovascular, pulmonary, GI, , musculoskeletal, neuro, skin, endocrine and psych systems are negative, except as otherwise noted.      Objective    /72   Pulse 99   Temp 98.6  F (37  C) (Tympanic)   Resp 16   Wt 92.5 kg (204 lb)   SpO2 98%   Breastfeeding No   BMI 30.13 kg/m    Body mass index is 30.13 kg/m .  Physical Exam   GENERAL: alert, no distress and over weight  RESP: lungs clear to auscultation - no rales, rhonchi or wheezes  CV: regular rate and rhythm, normal S1 S2, no S3 or S4, no murmur, click or rub, no peripheral edema and peripheral pulses strong  MS/neuro: {:r shoulder-has known full thickness rotator cuff tears this was not examined. Left shoulder-range of motion is pretty normal on exam. Time with exam was limited due to grandson becoming a bit agitated. Distal sensation grossly intact. Tender over anterior shoulder only. No gross deformity. Empty can test negative. Not much impingement but has some pain. Some weakness left shoulder.    SKIN: no suspicious lesions or rashes    PSYCH: mentation appears normal, affect normal/bright    Xray-initial read neg

## 2021-08-03 ENCOUNTER — OFFICE VISIT (OUTPATIENT)
Dept: FAMILY MEDICINE | Facility: CLINIC | Age: 56
End: 2021-08-03
Payer: COMMERCIAL

## 2021-08-03 ENCOUNTER — ANCILLARY PROCEDURE (OUTPATIENT)
Dept: GENERAL RADIOLOGY | Facility: CLINIC | Age: 56
End: 2021-08-03
Attending: PHYSICIAN ASSISTANT
Payer: COMMERCIAL

## 2021-08-03 VITALS
BODY MASS INDEX: 30.13 KG/M2 | DIASTOLIC BLOOD PRESSURE: 72 MMHG | TEMPERATURE: 98.6 F | HEART RATE: 99 BPM | RESPIRATION RATE: 16 BRPM | SYSTOLIC BLOOD PRESSURE: 126 MMHG | OXYGEN SATURATION: 98 % | WEIGHT: 204 LBS

## 2021-08-03 DIAGNOSIS — M25.512 ACUTE PAIN OF LEFT SHOULDER: Primary | ICD-10-CM

## 2021-08-03 DIAGNOSIS — M25.512 ACUTE PAIN OF LEFT SHOULDER: ICD-10-CM

## 2021-08-03 PROCEDURE — 99214 OFFICE O/P EST MOD 30 MIN: CPT | Performed by: PHYSICIAN ASSISTANT

## 2021-08-03 PROCEDURE — 73030 X-RAY EXAM OF SHOULDER: CPT | Mod: LT | Performed by: RADIOLOGY

## 2021-08-03 NOTE — PATIENT INSTRUCTIONS
Xray is negative. I will follow up with radiologist read and we will call you if anything else is seen on x-ray.

## 2021-08-04 NOTE — RESULT ENCOUNTER NOTE
Nia Joy,       Your recent test results are attached, if you have any questions or concerns please feel free to contact me via e-mail or call [phone].  Xray normal as we discussed.       It was a pleasure to see you at your recent office visit.      Sincerely,  Stella Santiago PA-C

## 2021-08-17 NOTE — PROGRESS NOTES
"Benita Benz  :  1965  DOS: 2021  MRN: [id number]    Sports Medicine Clinic Visit    PCP: Stella Santiago    Benita Benz is a 56 year old Right hand dominant female who is seen in consultation at the request of  Stella Santiago PA-C presenting with acute on chronic bilateral shoulder pain.    Injury: Gradual onset of pain over the past 1month(s). Did injure her right shoulder a few years ago and thinks she may be compensating. Pain located over left anterior shoulder, nonradiating.  Additional Features:  Positive: discomfort.  Symptoms are better with Rest, activity avoidance.  Symptoms are worse with: sleeping on her left side.  Other evaluation and/or treatments so far consists of: Tylenol.  Recent imaging completed: MRI of right shoulder completed 21 @ [facility], XR left shoulder completed 8/3/21.  Prior History of related problems: none  Right shoulder glenohumeral joint injection was last completed by Daniel Porter MD @ [facility] on 21.    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    Past Medical History:   Diagnosis Date     Arthritis      High cholesterol      Hypertension      Uncomplicated asthma      Past Surgical History:   Procedure Laterality Date     BIOPSY       COLONOSCOPY       EYE SURGERY  2014    toe surgery     ORTHOPEDIC SURGERY       Family History   Problem Relation Age of Onset     Diabetes Mother      Thyroid Disease Mother      Sarcoidosis Mother      Asthma Mother      Obesity Mother      Hypertension Father      Hyperlipidemia Father      Obesity Father      Obesity Brother      Obesity Sister        Objective  /83   Ht 1.753 m (5' 9\")   Wt " 92.5 kg (204 lb)   BMI 30.13 kg/m        General: healthy, alert and in no distress      HEENT: no scleral icterus or conjunctival erythema     Skin: no suspicious lesions or rash. No jaundice.     CV: regular rhythm by palpation, 2+ distal pulses, no pedal edema      Resp: normal respiratory effort without conversational dyspnea     Psych: normal mood and affect      Gait: nonantalgic, appropriate coordination and balance     Neuro: normal light touch sensory exam of the extremities. Motor strength as noted below     Left Shoulder exam    ROM:        Full active and passive ROM with flexion, extension, abduction, internal and external rotation.       dysfunctional scapular motion R>L    Tender:        subacromial space       Anterior shoulder over long head of biceps, milder over GH joint    Non Tender:       remainder of shoulder       sternoclavicular joint       acromioclavicular joint       posterior shoulder       periscapular region    Strength:        abduction 4/5 with pain on resistance testing       internal rotation 5/5       external rotation 5-/5       adduction 5/5    Impingement testing:        Minimally painful Neer       positive (+) Mullins       positive (+) empty can       neg (-) crossover       neg (-) crank    Stability testing:       neg (-) anterior glide       neg (-) sulcus sign    Skin:       no visible deformities       well perfused       capillary refill brisk    Sensation:        normal sensation over shoulder and upper extremity       Radiology  Results for orders placed or performed in visit on 08/03/21   XR Shoulder Left G/E 3 Views    Narrative    SHOULDER LEFT THREE OR MORE VIEWS    8/3/2021 3:23 PM     HISTORY:  Acute pain of left shoulder    FINDINGS: The Y view is oblique. If there is clinical concern for  glenohumeral dislocation, an axillary view or repeat Y view would be  recommended. Otherwise unremarkable.      Impression    IMPRESSION: No fracture identified.     DALJIT  MD ANNE         SYSTEM ID:  HARI     MRI of Right Shoulder completed @ [facility] 5/6/21 per O report  1.  Moderate supraspinatus and infraspinatus tendinopathy  2.  Full thickness tearing of supraspinatus tendon anteriorly measuring 1.5 x 0.5 cm  3.  Confluent partial thickness tearing of supraspinatus tendons more posteriorly extending into the anterior infraspinatus tendon  4.  Mild degenerative arthrosis of AC joint    Assessment:  1. Acute pain of left shoulder    2. Subacromial bursitis of left shoulder joint    3. Rotator cuff insufficiency of left shoulder        Plan:  Discussed the assessment with the patient.  Follow up: We will contact with MRI results  Reviewed clinical course for right shoulder which is usually a work comp injury with partial thickness tear of her supraspinatus  Can be followed by Dr. Porter at [facility], defer his treatment plan for the right shoulder which includes the operative surgery now that her tear has progressed to full-thickness on most recent MRI  Very low suspicion of full-thickness rotator cuff tear on the left side, no history of significant injury, pain initial worsened compensating for right shoulder issue  MRI ordered today to better clarify degenerative change, she is concern for rotator cuff tear given the known pathology in her right shoulder  We reviewed conservative care strategies for her shoulder, which will be clarified after her MRI  Physical therapy options reviewed, she will continue with her home exercise program that she is been working on for both shoulders for now  Reviewed the option for diagnostic/therapeutic corticosteroid injection in the future based on clinical progress  Activity modification strategies reviewed  XR images independently visualized and reviewed with patient today in clinic  We discussed modified progressive pain-free activity as tolerated  Home handouts provided and supportive care reviewed  All questions were answered  today  Contact us with additional questions or concerns  Signs and sx of concern reviewed    Thanks very much for sending this nice lady to us, I will keep you updated with her progress      Chaz Bolivar DO, [facility]  Sports Medicine Physician  [facility]            Disclaimer: This note consists of symbols derived from keyboarding, dictation and/or voice recognition software. As a result, there may be errors in the script that have gone undetected. Please consider this when interpreting information found in this chart.

## 2021-08-25 ENCOUNTER — OFFICE VISIT (OUTPATIENT)
Dept: ORTHOPEDICS | Facility: CLINIC | Age: 56
End: 2021-08-25
Attending: PHYSICIAN ASSISTANT
Payer: COMMERCIAL

## 2021-08-25 VITALS
HEIGHT: 69 IN | WEIGHT: 204 LBS | DIASTOLIC BLOOD PRESSURE: 83 MMHG | BODY MASS INDEX: 30.21 KG/M2 | SYSTOLIC BLOOD PRESSURE: 125 MMHG

## 2021-08-25 DIAGNOSIS — M25.312 ROTATOR CUFF INSUFFICIENCY OF LEFT SHOULDER: ICD-10-CM

## 2021-08-25 DIAGNOSIS — M25.512 ACUTE PAIN OF LEFT SHOULDER: Primary | ICD-10-CM

## 2021-08-25 DIAGNOSIS — M75.52 SUBACROMIAL BURSITIS OF LEFT SHOULDER JOINT: ICD-10-CM

## 2021-08-25 PROCEDURE — 99243 OFF/OP CNSLTJ NEW/EST LOW 30: CPT | Performed by: FAMILY MEDICINE

## 2021-08-25 ASSESSMENT — MIFFLIN-ST. JEOR: SCORE: 1579.72

## 2021-08-25 NOTE — LETTER
2021         RE: Benita Benz  [address] [address]        Dear Colleague,    Thank you for referring your patient, Benita Benz, to the [facility]. Please see a copy of my visit note below.    Benita Benz  :  1965  DOS: 2021  MRN: [id number]    Sports Medicine Clinic Visit    PCP: Stella Santiago    Benita Benz is a 56 year old Right hand dominant female who is seen in consultation at the request of  Stella Santiago PA-C presenting with acute on chronic bilateral shoulder pain.    Injury: Gradual onset of pain over the past 1month(s). Did injure her right shoulder a few years ago and thinks she may be compensating. Pain located over left anterior shoulder, nonradiating.  Additional Features:  Positive: discomfort.  Symptoms are better with Rest, activity avoidance.  Symptoms are worse with: sleeping on her left side.  Other evaluation and/or treatments so far consists of: Tylenol.  Recent imaging completed: MRI of right shoulder completed 21 @ [facility], XR left shoulder completed 8/3/21.  Prior History of related problems: none  Right shoulder glenohumeral joint injection was last completed by Daniel Porter MD @ [facility] on 21.    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    Past Medical History:   Diagnosis Date     Arthritis      High cholesterol      Hypertension      Uncomplicated asthma      Past Surgical History:   Procedure Laterality Date     BIOPSY       COLONOSCOPY       EYE SURGERY  2014    toe surgery     ORTHOPEDIC SURGERY       Family History   Problem Relation Age of Onset     Diabetes  "Mother      Thyroid Disease Mother      Sarcoidosis Mother      Asthma Mother      Obesity Mother      Hypertension Father      Hyperlipidemia Father      Obesity Father      Obesity Brother      Obesity Sister        Objective  /83   Ht 1.753 m (5' 9\")   Wt 92.5 kg (204 lb)   BMI 30.13 kg/m        General: healthy, alert and in no distress      HEENT: no scleral icterus or conjunctival erythema     Skin: no suspicious lesions or rash. No jaundice.     CV: regular rhythm by palpation, 2+ distal pulses, no pedal edema      Resp: normal respiratory effort without conversational dyspnea     Psych: normal mood and affect      Gait: nonantalgic, appropriate coordination and balance     Neuro: normal light touch sensory exam of the extremities. Motor strength as noted below     Left Shoulder exam    ROM:        Full active and passive ROM with flexion, extension, abduction, internal and external rotation.       dysfunctional scapular motion R>L    Tender:        subacromial space       Anterior shoulder over long head of biceps, milder over GH joint    Non Tender:       remainder of shoulder       sternoclavicular joint       acromioclavicular joint       posterior shoulder       periscapular region    Strength:        abduction 4/5 with pain on resistance testing       internal rotation 5/5       external rotation 5-/5       adduction 5/5    Impingement testing:        Minimally painful Neer       positive (+) Mullins       positive (+) empty can       neg (-) crossover       neg (-) crank    Stability testing:       neg (-) anterior glide       neg (-) sulcus sign    Skin:       no visible deformities       well perfused       capillary refill brisk    Sensation:        normal sensation over shoulder and upper extremity       Radiology  Results for orders placed or performed in visit on 08/03/21   XR Shoulder Left G/E 3 Views    Narrative    SHOULDER LEFT THREE OR MORE VIEWS    8/3/2021 3:23 PM     HISTORY:  " Acute pain of left shoulder    FINDINGS: The Y view is oblique. If there is clinical concern for  glenohumeral dislocation, an axillary view or repeat Y view would be  recommended. Otherwise unremarkable.      Impression    IMPRESSION: No fracture identified.     DALJIT RODRÍGUEZ MD         SYSTEM ID:  HARI     MRI of Right Shoulder completed @ [facility] 5/6/21 per [facility] report  1.  Moderate supraspinatus and infraspinatus tendinopathy  2.  Full thickness tearing of supraspinatus tendon anteriorly measuring 1.5 x 0.5 cm  3.  Confluent partial thickness tearing of supraspinatus tendons more posteriorly extending into the anterior infraspinatus tendon  4.  Mild degenerative arthrosis of AC joint    Assessment:  1. Acute pain of left shoulder    2. Subacromial bursitis of left shoulder joint    3. Rotator cuff insufficiency of left shoulder        Plan:  Discussed the assessment with the patient.  Follow up: We will contact with MRI results  Reviewed clinical course for right shoulder which is usually a work comp injury with partial thickness tear of her supraspinatus  Can be followed by Dr. Porter at [facility], defer his treatment plan for the right shoulder which includes the operative surgery now that her tear has progressed to full-thickness on most recent MRI  Very low suspicion of full-thickness rotator cuff tear on the left side, no history of significant injury, pain initial worsened compensating for right shoulder issue  MRI ordered today to better clarify degenerative change, she is concern for rotator cuff tear given the known pathology in her right shoulder  We reviewed conservative care strategies for her shoulder, which will be clarified after her MRI  Physical therapy options reviewed, she will continue with her home exercise program that she is been working on for both shoulders for now  Reviewed the option for diagnostic/therapeutic corticosteroid injection in the future based on clinical  progress  Activity modification strategies reviewed  XR images independently visualized and reviewed with patient today in clinic  We discussed modified progressive pain-free activity as tolerated  Home handouts provided and supportive care reviewed  All questions were answered today  Contact us with additional questions or concerns  Signs and sx of concern reviewed    Thanks very much for sending this nice lady to us, I will keep you updated with her progress      Chaz Bolivar DO, [facility]  Sports Medicine Physician  [facility]            Disclaimer: This note consists of symbols derived from keyboarding, dictation and/or voice recognition software. As a result, there may be errors in the script that have gone undetected. Please consider this when interpreting information found in this chart.      Again, thank you for allowing me to participate in the care of your patient.        Sincerely,        Chaz Bolivar DO

## 2021-08-31 ENCOUNTER — ANCILLARY PROCEDURE (OUTPATIENT)
Dept: MRI IMAGING | Facility: CLINIC | Age: 56
End: 2021-08-31
Attending: FAMILY MEDICINE
Payer: COMMERCIAL

## 2021-08-31 DIAGNOSIS — M75.52 SUBACROMIAL BURSITIS OF LEFT SHOULDER JOINT: ICD-10-CM

## 2021-08-31 DIAGNOSIS — M25.512 ACUTE PAIN OF LEFT SHOULDER: ICD-10-CM

## 2021-08-31 DIAGNOSIS — M25.312 ROTATOR CUFF INSUFFICIENCY OF LEFT SHOULDER: ICD-10-CM

## 2021-08-31 PROCEDURE — 73221 MRI JOINT UPR EXTREM W/O DYE: CPT | Mod: LT | Performed by: RADIOLOGY

## 2021-09-02 ENCOUNTER — TELEPHONE (OUTPATIENT)
Dept: ORTHOPEDICS | Facility: CLINIC | Age: 56
End: 2021-09-02

## 2021-09-02 NOTE — TELEPHONE ENCOUNTER
Called and reviewed MRI, near full thickness supraspinatus tear.  Can try injection but this does come with risks, she will contact us if interested in this option.  PT available anytime and can be very helpful.  She will discuss results with her orthopedic surgeon and contact me again as needed.  All questions answered today.      Chaz Bolivar DO, [facility]  Sports Medicine Physician  [facility]

## 2021-09-28 ENCOUNTER — TRANSFERRED RECORDS (OUTPATIENT)
Dept: HEALTH INFORMATION MANAGEMENT | Facility: CLINIC | Age: 56
End: 2021-09-28

## 2021-10-09 ENCOUNTER — HEALTH MAINTENANCE LETTER (OUTPATIENT)
Age: 56
End: 2021-10-09

## 2021-10-15 ENCOUNTER — OFFICE VISIT (OUTPATIENT)
Dept: FAMILY MEDICINE | Facility: CLINIC | Age: 56
End: 2021-10-15
Payer: COMMERCIAL

## 2021-10-15 VITALS
WEIGHT: 205.8 LBS | OXYGEN SATURATION: 99 % | RESPIRATION RATE: 14 BRPM | BODY MASS INDEX: 30.39 KG/M2 | SYSTOLIC BLOOD PRESSURE: 123 MMHG | HEART RATE: 87 BPM | TEMPERATURE: 98 F | DIASTOLIC BLOOD PRESSURE: 84 MMHG

## 2021-10-15 DIAGNOSIS — R59.1 LYMPHADENOPATHY: Primary | ICD-10-CM

## 2021-10-15 DIAGNOSIS — R07.0 THROAT PAIN: ICD-10-CM

## 2021-10-15 DIAGNOSIS — Z20.822 SUSPECTED 2019 NOVEL CORONAVIRUS INFECTION: ICD-10-CM

## 2021-10-15 LAB
DEPRECATED S PYO AG THROAT QL EIA: NEGATIVE
GROUP A STREP BY PCR: NOT DETECTED

## 2021-10-15 PROCEDURE — U0005 INFEC AGEN DETEC AMPLI PROBE: HCPCS | Performed by: PHYSICIAN ASSISTANT

## 2021-10-15 PROCEDURE — 87651 STREP A DNA AMP PROBE: CPT | Performed by: PHYSICIAN ASSISTANT

## 2021-10-15 PROCEDURE — 99213 OFFICE O/P EST LOW 20 MIN: CPT | Performed by: PHYSICIAN ASSISTANT

## 2021-10-15 PROCEDURE — U0003 INFECTIOUS AGENT DETECTION BY NUCLEIC ACID (DNA OR RNA); SEVERE ACUTE RESPIRATORY SYNDROME CORONAVIRUS 2 (SARS-COV-2) (CORONAVIRUS DISEASE [COVID-19]), AMPLIFIED PROBE TECHNIQUE, MAKING USE OF HIGH THROUGHPUT TECHNOLOGIES AS DESCRIBED BY CMS-2020-01-R: HCPCS | Performed by: PHYSICIAN ASSISTANT

## 2021-10-15 ASSESSMENT — PAIN SCALES - GENERAL: PAINLEVEL: MODERATE PAIN (4)

## 2021-10-15 NOTE — PATIENT INSTRUCTIONS
Brandin Joy,    Thank you for allowing [facility] to manage your care.    I am unsure of the cause of your symptoms, but your exam is reassuring. We will see what our workup shows.     If you develop worsening/changing symptoms at any time, please call 911 or go to the emergency department for evaluation.    I ordered an ultrasound and labs if you are not improving in 2 weeks. Please call diagnostic imaging [phone] to schedule your ultrasound and [phone] to schedule your lab appointment.    Please allow 1-2 business days for our office to contact you in regards to your laboratory/radiological studies.  If not done so, I encourage you to login into [employer] [email or web address] to review your results as well.     For your pain, please use Ibuprofen 400mg four times daily with food. Between ibuprofen doses, you may use Tylenol 650mg.     Max acetaminophen (Tylenol) 4,000mg/24 hours  Max ibuprofen 3,200mg/24 hours    Drink 8-10 glasses of fluid daily to stay well-hydrated.    If you have any questions or concerns, please feel free to call us at [phone]    Sincerely,    Bartolo Lyons PA-C    Did you know?      You can schedule a video visit for follow-up appointments as well as future appointments for certain conditions.  Please see the below link.     [email or web address]    If you have not already done so,  I encourage you to sign up for [email or web address] ([email or web address]).  This will allow you to review your results, securely communicate with a provider, and schedule virtual visits as well.      Patient Education     Lymphadenopathy  Lymphadenopathy is swelling of the lymph nodes. Lymph nodes are small, bean-shaped glands around the body.  What are lymph nodes?  Lymph nodes are part of your immune system. These glands are found in your neck, over your clavicle, armpits, groin, chest, and belly (abdomen). They act as filters  for lymph fluid as it flows through your body. Lymph fluid contains white blood cells (lymphocytes) that help the body fight infection and disease.   Why lymph nodes swell  Lymphadenopathy is very common. The glands often get larger during a viral or bacterial infection. It can happen during a cold, the flu, or strep throat. The nodes may swell in just one area of the body, such as the neck (localized). Or nodes may swell all over the body (generalized). The neck (cervical) lymph nodes are the most common site of lymphadenopathy.  What causes lymphadenopathy?  Dead cells and fluid build up in the lymph nodes as they help fight infection or disease. This causes them to swell in size. Enlarged lymph nodes are often near the source of infection. This can help to find the cause of an infection. For example, swollen lymph nodes around the jaw may be because of an infection in the teeth or mouth. But lymphadenopathy may also be generalized. This is common in some viral illnesses such as infectious mononucleosis, HIV, or chickenpox (varicella).  Lymphadenopathy can also be caused by:    Infection of a lymph node or small group of nodes (lymphadenitis)    Cancer    Reactions to medicines such as antibiotics and certain blood pressure, gout, and seizure medicines    Other health conditions, such as lupus or sarcoidosis  Symptoms of lymphadenopathy  Lymphadenopathy can cause symptoms such as:    Lumps under the jaw, on the sides or back of the neck, in the armpits, in the groin, or in the chest or belly (abdomen)    Pain or soreness in any of these areas    Redness or warmth in any of these areas  You may also have symptoms from an infection causing the swollen glands. These symptoms may include fever, sore throat, body aches, or cough.  Diagnosing lymphadenopathy  Your healthcare provider will ask about your health history and symptoms. He or she will give you a physical exam and check the areas where lymph nodes are  enlarged. Your provider will check the size, texture, and location of the nodes. He or she will ask how long they have been swollen and if they are painful. You may be advised to have diagnostic tests and referral to specialists may be advised. The tests may include:    Blood tests. These are done to check for signs of infection and other problems.    Urine test. This is also done to check for infection and other problems.    Chest X-ray, ultrasound, CT scan, or MRI scans. These tests can show enlarged lymph nodes or other problems.    Lymph node biopsy. The cause of enlarged lymph nodes may be checked with a biopsy. Small samples of lymph node tissue are taken and checked in a lab for signs of infection, cancer, and other causes. You may be referred to other specialists for their opinion as well.  Treatment for lymphadenopathy  The treatment of enlarged lymph nodes depends on the cause. Enlarged lymph nodes are often harmless and go away without any treatment. Treatment is most often done on the cause of the enlarged nodes and may include:    Antibiotic or antiviral medicine to treat a bacterial or viral infection    Incision and drainage of a lymph node for lymphadenitis    Other medicines or procedures to treat the cause of the enlarged nodes  You may need a follow-up exam in 3 to 4 weeks to recheck enlarged nodes.  When to call your healthcare provider  Call your healthcare provider if:    Your symptoms get worse    You have new symptoms    You have a fever of 100.4 F (38 C) or higher, or as directed by your provider    Your lymph nodes that are still swollen after 3 to 4 weeks    Martin last reviewed this educational content on 6/1/2019 2000-2021 [employer]. All rights reserved. This information is not intended as a substitute for professional medical care. Always follow your healthcare professional's instructions.

## 2021-10-15 NOTE — PROGRESS NOTES
Assessment & Plan   Problem List Items Addressed This Visit     None      Visit Diagnoses     Lymphadenopathy    -  Primary    Relevant Orders    Symptomatic COVID-19 Virus (Coronavirus) by PCR Nose    US Head Neck Soft Tissue    CBC with platelets and differential    Mononucleosis screen    Throat pain        Relevant Orders    Symptomatic COVID-19 Virus (Coronavirus) by PCR Nose    Streptococcus A Rapid Screen w/Reflex to PCR - Clinic Collect (Completed)    Group A Streptococcus PCR Throat Swab    Suspected 2019 novel coronavirus infection        Relevant Orders    Symptomatic COVID-19 Virus (Coronavirus) by PCR Nose         Impression is adenopathy from strep vs viral illness including COVID-19. Will order COVID-19 PC though she is vaccinated. Rapid strep negative. Appears well and non-toxic and I have low suspicion for impending airway obstruction or respiratory distress.  She will push p.o. fluids, use over-the-counter meds for symptoms, and follow-up with us in 2 weeks for mono test, CBC and US if not improving or urgent care/the ER if symptoms worsen/change at any time.    Complete history and physical exam as below. AF with normal VS.    DDx and Dx discussed with and explained to the pt to their satisfaction.  All questions were answered at this time. Pt expressed understanding of and agreement with this dx, tx, and plan. No further workup warranted and standard medication warnings given. I have given the patient a list of pertinent indications for re-evaluation. Will go to the Emergency Department if symptoms worsen or new concerning symptoms arise. Patient left in no apparent distress.     20 minutes spent on the date of the encounter doing chart review, history and exam, documentation and further activities per the note     See Patient Instructions    Return in about 2 weeks (around 10/29/2021) for a recheck of your symptoms if not improving, or call 911/go to an ER anytime if worsening.    Gildardo ANNA  JACINTA [facility] KEVIN Joy is a 56 year old who presents for the following health issues     HPI     Swollen left neck lymph node. Noticed it yesterday. Hoarse voice started yesterday. Some pain. No redness. No fevers. Feels pretty big. It moves. Feels like a dry scab in the inside. Tries to cough it up. Has not put anything on it. Has drank tea for the throat. No sore throat. Fully vaccinated against COVID.    Review of Systems   Constitutional, HEENT, cardiovascular, pulmonary, gi and gu systems are negative, except as otherwise noted.      Objective    /84   Pulse 87   Temp 98  F (36.7  C) (Tympanic)   Resp 14   Wt 93.4 kg (205 lb 12.8 oz)   LMP 08/02/2021 (Approximate)   SpO2 99%   BMI 30.39 kg/m    Body mass index is 30.39 kg/m .  Physical Exam  Vitals and nursing note reviewed.   Constitutional:       General: She is not in acute distress.     Appearance: She is not ill-appearing or diaphoretic.   HENT:      Head: Normocephalic and atraumatic.      Nose: Nose normal.      Mouth/Throat:      Mouth: Mucous membranes are moist.      Pharynx: Oropharynx is clear.   Eyes:      Conjunctiva/sclera: Conjunctivae normal.   Cardiovascular:      Rate and Rhythm: Normal rate and regular rhythm.      Heart sounds: Normal heart sounds. No murmur heard.   No friction rub. No gallop.    Pulmonary:      Effort: Pulmonary effort is normal. No respiratory distress.      Breath sounds: Normal breath sounds. No stridor. No wheezing, rhonchi or rales.   Abdominal:      General: Bowel sounds are normal. There is no distension.      Palpations: Abdomen is soft. There is no mass.      Tenderness: There is no abdominal tenderness. There is no guarding or rebound.      Hernia: No hernia is present.   Musculoskeletal:      Cervical back: Normal range of motion and neck supple. No rigidity.   Lymphadenopathy:      Cervical: Cervical adenopathy (left submandibular node is tender and  ~1cm in size) present.   Skin:     General: Skin is warm and dry.   Neurological:      General: No focal deficit present.      Mental Status: She is alert. Mental status is at baseline.   Psychiatric:         Mood and Affect: Mood normal.         Behavior: Behavior normal.          Results for orders placed or performed in visit on 10/15/21   Streptococcus A Rapid Screen w/Reflex to PCR - Clinic Collect     Status: Normal    Specimen: Throat; Swab   Result Value Ref Range    Group A Strep antigen Negative Negative

## 2021-10-16 LAB — SARS-COV-2 RNA RESP QL NAA+PROBE: NEGATIVE

## 2021-11-01 ENCOUNTER — ANCILLARY PROCEDURE (OUTPATIENT)
Dept: ULTRASOUND IMAGING | Facility: CLINIC | Age: 56
End: 2021-11-01
Attending: PHYSICIAN ASSISTANT
Payer: COMMERCIAL

## 2021-11-01 DIAGNOSIS — R59.1 LYMPHADENOPATHY: ICD-10-CM

## 2021-11-01 PROCEDURE — 76536 US EXAM OF HEAD AND NECK: CPT | Performed by: RADIOLOGY

## 2021-11-02 DIAGNOSIS — E04.1 THYROID NODULE: Primary | ICD-10-CM

## 2021-11-09 ENCOUNTER — ANCILLARY PROCEDURE (OUTPATIENT)
Dept: ULTRASOUND IMAGING | Facility: CLINIC | Age: 56
End: 2021-11-09
Attending: PHYSICIAN ASSISTANT
Payer: COMMERCIAL

## 2021-11-09 DIAGNOSIS — E04.1 THYROID NODULE: ICD-10-CM

## 2021-11-09 PROCEDURE — 76536 US EXAM OF HEAD AND NECK: CPT | Performed by: RADIOLOGY

## 2021-11-10 ENCOUNTER — OFFICE VISIT (OUTPATIENT)
Dept: ORTHOPEDICS | Facility: CLINIC | Age: 56
End: 2021-11-10
Payer: COMMERCIAL

## 2021-11-10 DIAGNOSIS — E04.1 THYROID NODULE: Primary | ICD-10-CM

## 2021-11-10 DIAGNOSIS — M72.2 PLANTAR FASCIITIS: Primary | ICD-10-CM

## 2021-11-10 DIAGNOSIS — M79.671 PAIN OF RIGHT HEEL: ICD-10-CM

## 2021-11-10 PROCEDURE — 20550 NJX 1 TENDON SHEATH/LIGAMENT: CPT | Performed by: PODIATRIST

## 2021-11-10 RX ORDER — DEXAMETHASONE SODIUM PHOSPHATE 4 MG/ML
4 INJECTION, SOLUTION INTRA-ARTICULAR; INTRALESIONAL; INTRAMUSCULAR; INTRAVENOUS; SOFT TISSUE
Status: DISCONTINUED | OUTPATIENT
Start: 2021-11-10 | End: 2022-10-07

## 2021-11-10 RX ORDER — LIDOCAINE HYDROCHLORIDE 10 MG/ML
1 INJECTION, SOLUTION EPIDURAL; INFILTRATION; INTRACAUDAL; PERINEURAL
Status: DISCONTINUED | OUTPATIENT
Start: 2021-11-10 | End: 2022-10-07

## 2021-11-10 RX ADMIN — LIDOCAINE HYDROCHLORIDE 1 ML: 10 INJECTION, SOLUTION EPIDURAL; INFILTRATION; INTRACAUDAL; PERINEURAL at 14:58

## 2021-11-10 RX ADMIN — DEXAMETHASONE SODIUM PHOSPHATE 4 MG: 4 INJECTION, SOLUTION INTRA-ARTICULAR; INTRALESIONAL; INTRAMUSCULAR; INTRAVENOUS; SOFT TISSUE at 14:58

## 2021-11-10 NOTE — PROGRESS NOTES
Chief Complaint   Patient presents with     Follow Up     Requesting bilateral heel injections. Patient relates that she has surgery in December, 2021 on her shoulder.             Allergies   Allergen Reactions     Simvastatin      Periferal neuropathy     Statin Drugs [Hmg-Coa-R Inhibitors] Other (See Comments)     Numbness is legs         Subjective: Benita is a 55 year old female who presents to the clinic today for a follow up of right heel pain.  Nature: sharp    Location: right plantar heel    Duration: 1 month    Onset: gradual. No inciting trauma    Course: worsening    Aggravating/alleviating factors: + post-static dyskinesia.     Previous Treatments: Previous CSI.        Objective  Data Unavailable Data Unavailable Data Unavailable Data Unavailable Data Unavailable 0 lbs 0 oz  DP and PT pulses 2/4 on the right. CRT is instant. + Pedal hair.   Gross sensation is intact on the right.   Pain noted today with palpation of the medial attachment of the plantar fascia on the right heel. No pain in the medial band of the plantar fascia. No pain noted along the courses of the PT, peroneal, or Achilles tendons BL. No pain with lateral calc squeeze. No pain with STJ ROM.     No x-rays indicated during today's visit  Previous films were reviewed today, independent visualization of images was performed, and results were discussed with the patient        Assessment: Benita is a 56 YO with right plantar fasciitis.  She has had an injection into this heel before.  I discussed with her that the more repeat steroid into her foot about the plantar fasciitis, the greater chance she has of the plantar fascia rupturing.  Although no think this will happen in this 1, I did reiterate to her that this is a complication.    Plan:   - Pt seen and evaluated  - She would like a steroid injection today. She last had one four years ago. I discussed the risks, complications, benefits, alternatives and answered all her questions. Consent  was signed. All questions answered. After skin prep, an injection consisting of 2cc's of a 1:1 mix of 1% lidocaine plain + Kenalog-10 + dexamethasone 4mg was injected into the left heel. She tolerated this well with no complications.   - Pt to return to clinic PRN

## 2021-11-10 NOTE — LETTER
11/10/2021         RE: Benita Benz  [id number] [facility] [address]        Dear Colleague,    Thank you for referring your patient, Benita Benz, to the [facility] [place]. Please see a copy of my visit note below.    Chief Complaint   Patient presents with     Follow Up     Requesting bilateral heel injections. Patient relates that she has surgery in December, 2021 on her shoulder.             Allergies   Allergen Reactions     Simvastatin      Periferal neuropathy     Statin Drugs [Hmg-Coa-R Inhibitors] Other (See Comments)     Numbness is legs         Subjective: Benita is a 55 year old female who presents to the clinic today for a follow up of right heel pain.  Nature: sharp    Location: right plantar heel    Duration: 1 month    Onset: gradual. No inciting trauma    Course: worsening    Aggravating/alleviating factors: + post-static dyskinesia.     Previous Treatments: Previous CSI.        Objective  Data Unavailable Data Unavailable Data Unavailable Data Unavailable Data Unavailable 0 lbs 0 oz  DP and PT pulses 2/4 on the right. CRT is instant. + Pedal hair.   Gross sensation is intact on the right.   Pain noted today with palpation of the medial attachment of the plantar fascia on the right heel. No pain in the medial band of the plantar fascia. No pain noted along the courses of the PT, peroneal, or Achilles tendons BL. No pain with lateral calc squeeze. No pain with STJ ROM.     No x-rays indicated during today's visit  Previous films were reviewed today, independent visualization of images was performed, and results were discussed with the patient        Assessment: Benita is a 54 YO with right plantar fasciitis.  She has had an injection into this heel before.  I discussed with her that the more repeat steroid into her foot about the plantar fasciitis, the greater chance she has of the plantar fascia rupturing.  Although no think this will happen in this 1, I  did reiterate to her that this is a complication.    Plan:   - Pt seen and evaluated  - She would like a steroid injection today. She last had one four years ago. I discussed the risks, complications, benefits, alternatives and answered all her questions. Consent was signed. All questions answered. After skin prep, an injection consisting of 2cc's of a 1:1 mix of 1% lidocaine plain + Kenalog-10 + dexamethasone 4mg was injected into the left heel. She tolerated this well with no complications.   - Pt to return to clinic PRN      Small Joint Injection/Arthrocentesis    Date/Time: 11/10/2021 2:58 PM  Performed by: Amos Arreola DPM  Authorized by: Amos Arreola DPM   Indications:  Pain  Needle Size:  25 G      Location:  Foot   Location comment:  Right heel                      Medications:  10 mg triamcinolone acetonide 10 MG/ML; 1 mL lidocaine (PF) 1 %; 4 mg dexamethasone 4 MG/ML        Outcome:  Tolerated well, no immediate complications  Procedure discussed: discussed risks, benefits, and alternatives    Consent Given by:  Patient  Timeout: timeout called immediately prior to procedure    Prep: patient was prepped and draped in usual sterile fashion          [facility] [address]  4TH FLOOR  [facility] [id number] [phone]  Dept: [phone]  ______________________________________________________________________________    Patient: Benita Benz   : 1965   MRN: [id number]   November 10, 2021    INVASIVE PROCEDURE SAFETY CHECKLIST    Date: 11/10/2021   Procedure: Right heel injection  Patient Name: Benita Benz  MRN: [id number]  YOB: 1965    Action: Complete sections as appropriate. Any discrepancy results in a HARD COPY until resolved.     PRE PROCEDURE:  Patient ID verified with 2 identifiers (name and  or MRN): Yes  Procedure and site verified with patient/designee (when able): Yes  Accurate consent  documentation in medical record: Yes  H&P (or appropriate assessment) documented in medical record: NA  H&P must be up to 20 days prior to procedure and updates within 24 hours of procedure as applicable: NA  Relevant diagnostic and radiology test results appropriately labeled and displayed as applicable: Yes  Procedure site(s) marked with provider initials: NA    TIMEOUT:  Time-Out performed immediately prior to starting procedure, including verbal and active participation of all team members addressing the following:Yes  * Correct patient identify  * Confirmed that the correct side and site are marked  * An accurate procedure consent form  * Agreement on the procedure to be done  * Correct patient position  * Relevant images and results are properly labeled and appropriately displayed  * The need to administer antibiotics or fluids for irrigation purposes during the procedure as applicable   * Safety precautions based on patient history or medication use    DURING PROCEDURE: Verification of correct person, site, and procedures any time the responsibility for care of the patient is transferred to another member of the care team.       The following medications were given:         Prior to injection, verified patient identity using patient's name and date of birth.  Due to injection administration, patient instructed to remain in clinic for 15 minutes  afterwards, and to report any adverse reaction to me immediately.      Medication Name: Kenalog-10, 50 mg  Per 5 ml   NDC: 3795-8651-80  Drug Amount Wasted:  4 ml  Vial/Syringe: Multi dose vial  Expiration Date:  SEP 2022      Medication Name: Lidocaine 1%, 50 mg per 5 ml   NDC: 70766-379-69  Drug Amount Wasted:  Yes, 4 ml  Vial/Syringe: Multi dose vial  Expiration Date: 07/24    Scribed by Katya Slaughter LPN for Dr. Arreola on November 10, 2021 at based on the provider's statements to me.     Katya Slaughter LPN    Sincerely,        Amos Arreola DPM

## 2021-11-11 DIAGNOSIS — M79.671 PAIN OF RIGHT HEEL: ICD-10-CM

## 2021-11-11 DIAGNOSIS — M72.2 PLANTAR FASCIITIS: Primary | ICD-10-CM

## 2021-11-29 ENCOUNTER — OFFICE VISIT (OUTPATIENT)
Dept: FAMILY MEDICINE | Facility: CLINIC | Age: 56
End: 2021-11-29
Payer: COMMERCIAL

## 2021-11-29 VITALS
WEIGHT: 210.2 LBS | RESPIRATION RATE: 18 BRPM | TEMPERATURE: 97.7 F | HEART RATE: 79 BPM | OXYGEN SATURATION: 100 % | SYSTOLIC BLOOD PRESSURE: 136 MMHG | DIASTOLIC BLOOD PRESSURE: 85 MMHG | BODY MASS INDEX: 31.04 KG/M2

## 2021-11-29 DIAGNOSIS — M75.101 TEAR OF RIGHT ROTATOR CUFF, UNSPECIFIED TEAR EXTENT, UNSPECIFIED WHETHER TRAUMATIC: ICD-10-CM

## 2021-11-29 DIAGNOSIS — J45.20 MILD INTERMITTENT ASTHMA WITHOUT COMPLICATION: ICD-10-CM

## 2021-11-29 DIAGNOSIS — I10 BENIGN ESSENTIAL HYPERTENSION: ICD-10-CM

## 2021-11-29 DIAGNOSIS — Z01.818 PREOPERATIVE EXAMINATION: Primary | ICD-10-CM

## 2021-11-29 DIAGNOSIS — F51.01 PRIMARY INSOMNIA: ICD-10-CM

## 2021-11-29 LAB
ERYTHROCYTE [DISTWIDTH] IN BLOOD BY AUTOMATED COUNT: 14.1 % (ref 10–15)
HCT VFR BLD AUTO: 37.4 % (ref 35–47)
HGB BLD-MCNC: 12.5 G/DL (ref 11.7–15.7)
MCH RBC QN AUTO: 30 PG (ref 26.5–33)
MCHC RBC AUTO-ENTMCNC: 33.4 G/DL (ref 31.5–36.5)
MCV RBC AUTO: 90 FL (ref 78–100)
PLATELET # BLD AUTO: 357 10E3/UL (ref 150–450)
RBC # BLD AUTO: 4.17 10E6/UL (ref 3.8–5.2)
WBC # BLD AUTO: 8 10E3/UL (ref 4–11)

## 2021-11-29 PROCEDURE — 99214 OFFICE O/P EST MOD 30 MIN: CPT | Performed by: INTERNAL MEDICINE

## 2021-11-29 PROCEDURE — 80048 BASIC METABOLIC PNL TOTAL CA: CPT | Performed by: INTERNAL MEDICINE

## 2021-11-29 PROCEDURE — 85027 COMPLETE CBC AUTOMATED: CPT | Performed by: INTERNAL MEDICINE

## 2021-11-29 PROCEDURE — 36415 COLL VENOUS BLD VENIPUNCTURE: CPT | Performed by: INTERNAL MEDICINE

## 2021-11-29 ASSESSMENT — ENCOUNTER SYMPTOMS
PALPITATIONS: 0
SHORTNESS OF BREATH: 0
FEVER: 0
HEADACHES: 0
LIGHT-HEADEDNESS: 0
CHEST TIGHTNESS: 0
CHILLS: 0

## 2021-11-29 NOTE — PROGRESS NOTES
[address], [address]  [facility] [address]  Phone: [phone]  Primary Provider: Stella Santiago  Pre-op Performing Provider: MONIQUE GARRIDO      PREOPERATIVE EVALUATION:  Today's date: 11/29/2021    Benita Benz is a 56 year old female who presents for a preoperative evaluation.    Surgical Information:  Surgery/Procedure: right rotator cuff repair   Surgery Location: [facility]  Surgeon: Dr. Porter  Surgery Date: 12/02/2021  Time of Surgery: TBD  Where patient plans to recover: At home with family  Fax number for surgical facility: [phone]    Type of Anesthesia Anticipated: General    Assessment & Plan     The proposed surgical procedure is considered INTERMEDIATE risk.    Preoperative examination  Approval given for the procedure.  Pending lab work.  Discussed with patient to continue all medications on the day of procedure.  - CBC with Platelets (Today); Future  - Basic metabolic panel; Future      Benign essential hypertension  Stable.  Continue medication    Mild intermittent asthma without complication  Stable.  Continue medications as needed    Primary insomnia  Stable.  Continue medications as needed    Tear of right rotator cuff, unspecified tear extent, unspecified whether traumatic  Schedule for right rotator cuff repair.    Risks and Recommendations:  The patient has the following additional risks and recommendations for perioperative complications:   - No identified additional risk factors other than previously addressed    Medication Instructions:  Patient is to take all scheduled medications on the day of surgery    RECOMMENDATION:  APPROVAL GIVEN to proceed with proposed procedure pending review of diagnostic evaluation.      Subjective     HPI related to upcoming procedure:   Benita is a 56-year-old lady who presented to the clinic for preop exam.  Benita has hypertension and takes lisinopril 40 mg and amlodipine 10  mg daily.  She has mild intermittent asthma and takes albuterol as needed.  She is undergoing right shoulder rotator cuff repair.  Patient denies chest pain, shortness of breath, palpitations, headache, lightheadedness, syncope, fever or chills. Patient is able to climb 1 flight of stairs without feeling short of breath or having chest pain.  Patient denies personal or family history of complications with anesthesia. Patient does not have a history of heart failure or TIA/stroke. Patient does not smoke or drink alcohol.      Preop Questions 11/26/2021   1. Have you ever had a heart attack or stroke? No   2. Have you ever had surgery on your heart or blood vessels, such as a stent placement, a coronary artery bypass, or surgery on an artery in your head, neck, heart, or legs? No   3. Do you have chest pain with activity? No   4. Do you have a history of  heart failure? No   5. Do you currently have a cold, bronchitis or symptoms of other infection? No   6. Do you have a cough, shortness of breath, or wheezing? No   7. Do you or anyone in your family have previous history of blood clots? No   8. Do you or does anyone in your family have a serious bleeding problem such as prolonged bleeding following surgeries or cuts? No   9. Have you ever had problems with anemia or been told to take iron pills? YES - resolved   10. Have you had any abnormal blood loss such as black, tarry or bloody stools, or abnormal vaginal bleeding? No   11. Have you ever had a blood transfusion? No   12. Are you willing to have a blood transfusion if it is medically needed before, during, or after your surgery? Yes   13. Have you or any of your relatives ever had problems with anesthesia? No   14. Do you have sleep apnea, excessive snoring or daytime drowsiness? No   15. Do you have any artifical heart valves or other implanted medical devices like a pacemaker, defibrillator, or continuous glucose monitor? No   16. Do you have artificial joints?  No   17. Are you allergic to latex? No   18. Is there any chance that you may be pregnant? No       Health Care Directive:  Patient does not have a Health Care Directive or Living Will: Discussed advance care planning with patient; however, patient declined at this time.    Preoperative Review of :   reviewed - controlled substances reflected in medication list.    Review of Systems   Constitutional: Negative for chills and fever.   Respiratory: Negative for chest tightness and shortness of breath.    Cardiovascular: Negative for chest pain and palpitations.   Neurological: Negative for syncope, light-headedness and headaches.       Patient Active Problem List    Diagnosis Date Noted     Preoperative examination 11/29/2021     Priority: Medium     Advanced directives, counseling/discussion 08/04/2020     Priority: Medium     Pt was given info on ADVMilka Kwan MA       Low back pain, unspecified back pain laterality, unspecified chronicity, unspecified whether sciatica present 07/03/2019     Priority: Medium     Plantar fasciitis of left foot 11/28/2018     Priority: Medium     Pain of left heel 11/28/2018     Priority: Medium     Dysmenorrhea 01/26/2018     Priority: Medium     Episodic tension-type headache, not intractable 01/26/2018     Priority: Medium     Benign essential hypertension 06/17/2016     Priority: Medium     Mild intermittent asthma without complication 06/17/2016     Priority: Medium     Primary insomnia 06/17/2016     Priority: Medium      Past Medical History:   Diagnosis Date     Arthritis      High cholesterol      Hypertension      Uncomplicated asthma      Past Surgical History:   Procedure Laterality Date     BIOPSY       COLONOSCOPY       EYE SURGERY  2014    toe surgery     ORTHOPEDIC SURGERY       Current Outpatient Medications   Medication Sig Dispense Refill     albuterol (PROAIR HFA/PROVENTIL HFA/VENTOLIN HFA) 108 (90 Base) MCG/ACT inhaler INHALE 2 PUFFS BY MOUTH EVERY 6 HOURS  AS NEEDED FOR SHORTNESS OF BREATH /DYSPNEA  OR  WHEEZING 18 g 0     amLODIPine (NORVASC) 10 MG tablet Take 1 tablet (10 mg) by mouth daily 90 tablet 3     diclofenac (VOLTAREN) 1 % topical gel Apply 2 grams to hands four times daily using enclosed dosing card. (Patient taking differently: as needed Apply 2 grams to hands four times daily using enclosed dosing card.) 100 g 1     Flaxseed, Linseed, (FLAX SEED OIL) 1000 MG capsule Take 1 capsule by mouth as needed        lisinopril (ZESTRIL) 40 MG tablet Take 1 tablet (40 mg) by mouth daily 90 tablet 3     meclizine (ANTIVERT) 25 MG tablet TAKE 1 TABLET BY MOUTH EVERY 6 HOURS AS NEEDED FOR DIZZINESS 30 tablet 0     methocarbamol (ROBAXIN) 500 MG tablet Take 500 mg by mouth as needed        Multiple Vitamins-Minerals (MULTIVITAMIN ADULT PO)        Omega-3 Fatty Acids (OMEGA-3 FISH OIL PO)        omeprazole (PRILOSEC) 20 MG DR capsule Take 1 capsule by mouth once daily 90 capsule 0     order for DME Equipment being ordered: [id number] $254   Walking Boot, Select, MED 1 Device 0     pravastatin (PRAVACHOL) 40 MG tablet Take 1 tablet (40 mg) by mouth daily 90 tablet 3     triamcinolone (KENALOG) 0.1 % cream Apply sparingly to affected area three times daily as needed for up to 14 days 45 g 0     zolpidem (AMBIEN) 10 MG tablet Take 1 tablet (10 mg) by mouth nightly as needed for sleep 30 tablet 5       Allergies   Allergen Reactions     Simvastatin      Periferal neuropathy     Statin Drugs [Hmg-Coa-R Inhibitors] Other (See Comments)     Numbness is legs        Social History     Tobacco Use     Smoking status: Former Smoker     Packs/day: 1.50     Years: 2.00     Pack years: 3.00     Types: Cigarettes     Start date: 1997     Quit date: 1999     Years since quittin.4     Smokeless tobacco: Never Used   Substance Use Topics     Alcohol use: Not Currently     Comment: rare     History   Drug Use Unknown         Objective     /85 (BP Location: Left  arm, Cuff Size: Adult Regular)   Pulse 79   Temp 97.7  F (36.5  C) (Temporal)   Resp 18   Wt 95.3 kg (210 lb 3.2 oz)   LMP 08/16/2021 (Approximate)   SpO2 100%   BMI 31.04 kg/m      Physical Exam  Vitals reviewed.   Constitutional:       Appearance: Normal appearance.   HENT:      Mouth/Throat:      Mouth: Mucous membranes are moist.      Pharynx: Oropharynx is clear. No oropharyngeal exudate or posterior oropharyngeal erythema.   Cardiovascular:      Rate and Rhythm: Normal rate and regular rhythm.      Heart sounds: Normal heart sounds. No murmur heard.  No gallop.    Pulmonary:      Effort: Pulmonary effort is normal. No respiratory distress.      Breath sounds: Normal breath sounds. No wheezing or rales.   Skin:     General: Skin is warm and dry.   Neurological:      Mental Status: She is alert.   Psychiatric:         Mood and Affect: Mood normal.         Behavior: Behavior normal.       Recent Labs   Lab Test 05/25/21  1112 05/25/21  1111 08/18/20  1104   HGB  --  12.4  --    PLT  --  358  --      --  138   POTASSIUM 4.7  --  4.6   CR 1.01  --  0.94        Diagnostics:  Labs pending at this time.  Results will be reviewed when available.   No EKG required, no history of coronary heart disease, significant arrhythmia, peripheral arterial disease or other structural heart disease.    Revised Cardiac Risk Index (RCRI):  The patient has the following serious cardiovascular risks for perioperative complications:   - No serious cardiac risks = 0 points     RCRI Interpretation: 0 points: Class I (very low risk - 0.4% complication rate)       Signed Electronically by: MONIQUE GARRIDO MD  Copy of this evaluation report is provided to requesting physician.

## 2021-11-29 NOTE — PATIENT INSTRUCTIONS
Avoid aspirin 7 days before the surgery. Avoid nonsteroidal anti-inflammatory pain medication like ibuprofen, Motrin, or Aleve 7 days before the surgery.  Tylenol can be used for pain.  Avoid any over the counter multivitamins or herbal supplement 7 days before surgery   You can resume these medications after surgery    Continue the rest of your medications as prescribed

## 2021-11-30 ASSESSMENT — ASTHMA QUESTIONNAIRES: ACT_TOTALSCORE: 24

## 2021-12-01 LAB
ANION GAP SERPL CALCULATED.3IONS-SCNC: 8 MMOL/L (ref 3–14)
BUN SERPL-MCNC: 14 MG/DL (ref 7–30)
CALCIUM SERPL-MCNC: 10.1 MG/DL (ref 8.5–10.1)
CHLORIDE BLD-SCNC: 105 MMOL/L (ref 94–109)
CO2 SERPL-SCNC: 28 MMOL/L (ref 20–32)
CREAT SERPL-MCNC: 0.96 MG/DL (ref 0.52–1.04)
GFR SERPL CREATININE-BSD FRML MDRD: 66 ML/MIN/1.73M2
GLUCOSE BLD-MCNC: 81 MG/DL (ref 70–99)
POTASSIUM BLD-SCNC: 4.5 MMOL/L (ref 3.4–5.3)
SODIUM SERPL-SCNC: 141 MMOL/L (ref 133–144)

## 2021-12-14 ENCOUNTER — TRANSFERRED RECORDS (OUTPATIENT)
Dept: HEALTH INFORMATION MANAGEMENT | Facility: CLINIC | Age: 56
End: 2021-12-14
Payer: COMMERCIAL

## 2021-12-15 ENCOUNTER — VIRTUAL VISIT (OUTPATIENT)
Dept: ENDOCRINOLOGY | Facility: CLINIC | Age: 56
End: 2021-12-15
Attending: PHYSICIAN ASSISTANT
Payer: COMMERCIAL

## 2021-12-15 DIAGNOSIS — E04.1 THYROID NODULE: ICD-10-CM

## 2021-12-15 PROCEDURE — 99204 OFFICE O/P NEW MOD 45 MIN: CPT | Mod: 95 | Performed by: INTERNAL MEDICINE

## 2021-12-15 NOTE — LETTER
12/15/2021         RE: Benita Benz  [address] [address]        Dear Colleague,    Thank you for referring your patient, Benita Benz, to the [facility]. Please see a copy of my visit note below.    CC: Thyroid nodules.     HPI:   Patient presents for evaluation of thyroid nodules.   Incidental finding on US to evaluate lymphadenopathy.   Notes occasional problem with food going down her trachea.   She has also had pill dysphagia before.     Prior episode of hoarseness, 5 months ago, which resolved on its own.     No menses for the last four months. Menses regular prior to that.    Currently taking oxycodone and atarax after shoulder surgery.     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       Answers for HPI/ROS submitted by the patient on 12/14/2021  General Symptoms: No  Skin Symptoms: No  HENT Symptoms: No  EYE SYMPTOMS: No  HEART SYMPTOMS: No  LUNG SYMPTOMS: No  INTESTINAL SYMPTOMS: No  URINARY SYMPTOMS: No  GYNECOLOGIC SYMPTOMS: No  BREAST SYMPTOMS: No  SKELETAL SYMPTOMS: No  BLOOD SYMPTOMS: No  NERVOUS SYSTEM SYMPTOMS: No  MENTAL HEALTH SYMPTOMS: No      ROS: 10 point ROS neg other than the symptoms noted above in the HPI.    PMH:   Patient Active Problem List   Diagnosis     Benign essential hypertension     Mild intermittent asthma without complication     Primary insomnia     Dysmenorrhea     Episodic tension-type headache, not intractable     Plantar fasciitis of left foot     Pain of left heel     Low back pain, unspecified back pain laterality, unspecified chronicity, unspecified whether sciatica present     Advanced directives, counseling/discussion     Preoperative examination     Tear of right rotator cuff, unspecified tear extent, unspecified whether traumatic      Meds:  Current Outpatient Medications   Medication     albuterol (PROAIR HFA/PROVENTIL HFA/VENTOLIN HFA)  108 (90 Base) MCG/ACT inhaler     amLODIPine (NORVASC) 10 MG tablet     diclofenac (VOLTAREN) 1 % topical gel     Flaxseed, Linseed, (FLAX SEED OIL) 1000 MG capsule     lisinopril (ZESTRIL) 40 MG tablet     meclizine (ANTIVERT) 25 MG tablet     methocarbamol (ROBAXIN) 500 MG tablet     Multiple Vitamins-Minerals (MULTIVITAMIN ADULT PO)     Omega-3 Fatty Acids (OMEGA-3 FISH OIL PO)     omeprazole (PRILOSEC) 20 MG DR capsule     order for DME     pravastatin (PRAVACHOL) 40 MG tablet     triamcinolone (KENALOG) 0.1 % cream     zolpidem (AMBIEN) 10 MG tablet     Current Facility-Administered Medications   Medication     dexamethasone (DECADRON) injection 4 mg     lidocaine (PF) (XYLOCAINE) 1 % injection 1 mL     triamcinolone acetonide (KENALOG-10) injection 10 mg      FHX:   Mother has thyroid nodules.     SHX:  Non-smoker.     Exam:   GENERAL: Healthy, alert and no distress  EYES: Eyes grossly normal to inspection.  No discharge or erythema, or obvious scleral/conjunctival abnormalities.  HENT: Normal cephalic/atraumatic.  External ears, nose and mouth without ulcers or lesions.  No nasal drainage visible.  RESP: No audible wheeze, cough, or visible cyanosis.  No visible retractions or increased work of breathing.    MS: No gross musculoskeletal defects noted.  Normal range of motion.  No visible edema.  SKIN: Visible skin clear. No significant rash, abnormal pigmentation or lesions.  NEURO: Cranial nerves grossly intact.  Mentation and speech appropriate for age.  PSYCH: Mentation appears normal, affect normal/bright, judgement and insight intact, normal speech and appearance well-groomed.     A/P:   Thyroid nodules - TSH needs to be checked. I have reviewed the natural history of thyroid nodules and thyroid cancer with the patient. Discussed need to FNA nodule #2.   -Schedule lab.   -Schedule biopsy of nodule #2.   Call [place] radiology scheduling for your procedure:  For scheduling in the [facility]  [facility]) call [phone] or [phone]      Start time 10:33 AM   Stop time 10:53 AM   Platform well  Patient in car.     Daniel Rosario MD on 12/15/2021 at 10:53 AM          Again, thank you for allowing me to participate in the care of your patient.        Sincerely,        Daniel Rosario MD

## 2021-12-15 NOTE — PROGRESS NOTES
CC: Thyroid nodules.     HPI:   Patient presents for evaluation of thyroid nodules.   Incidental finding on US to evaluate lymphadenopathy.   Notes occasional problem with food going down her trachea.   She has also had pill dysphagia before.     Prior episode of hoarseness, 5 months ago, which resolved on its own.     No menses for the last four months. Menses regular prior to that.    Currently taking oxycodone and atarax after shoulder surgery.     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       Answers for HPI/ROS submitted by the patient on 12/14/2021  General Symptoms: No  Skin Symptoms: No  HENT Symptoms: No  EYE SYMPTOMS: No  HEART SYMPTOMS: No  LUNG SYMPTOMS: No  INTESTINAL SYMPTOMS: No  URINARY SYMPTOMS: No  GYNECOLOGIC SYMPTOMS: No  BREAST SYMPTOMS: No  SKELETAL SYMPTOMS: No  BLOOD SYMPTOMS: No  NERVOUS SYSTEM SYMPTOMS: No  MENTAL HEALTH SYMPTOMS: No      ROS: 10 point ROS neg other than the symptoms noted above in the HPI.    PMH:   Patient Active Problem List   Diagnosis     Benign essential hypertension     Mild intermittent asthma without complication     Primary insomnia     Dysmenorrhea     Episodic tension-type headache, not intractable     Plantar fasciitis of left foot     Pain of left heel     Low back pain, unspecified back pain laterality, unspecified chronicity, unspecified whether sciatica present     Advanced directives, counseling/discussion     Preoperative examination     Tear of right rotator cuff, unspecified tear extent, unspecified whether traumatic      Meds:  Current Outpatient Medications   Medication     albuterol (PROAIR HFA/PROVENTIL HFA/VENTOLIN HFA) 108 (90 Base) MCG/ACT inhaler     amLODIPine (NORVASC) 10 MG tablet     diclofenac (VOLTAREN) 1 % topical gel     Flaxseed, Linseed, (FLAX SEED OIL) 1000 MG capsule     lisinopril (ZESTRIL) 40 MG tablet     meclizine (ANTIVERT) 25 MG tablet      methocarbamol (ROBAXIN) 500 MG tablet     Multiple Vitamins-Minerals (MULTIVITAMIN ADULT PO)     Omega-3 Fatty Acids (OMEGA-3 FISH OIL PO)     omeprazole (PRILOSEC) 20 MG DR capsule     order for DME     pravastatin (PRAVACHOL) 40 MG tablet     triamcinolone (KENALOG) 0.1 % cream     zolpidem (AMBIEN) 10 MG tablet     Current Facility-Administered Medications   Medication     dexamethasone (DECADRON) injection 4 mg     lidocaine (PF) (XYLOCAINE) 1 % injection 1 mL     triamcinolone acetonide (KENALOG-10) injection 10 mg      FHX:   Mother has thyroid nodules.     SHX:  Non-smoker.     Exam:   GENERAL: Healthy, alert and no distress  EYES: Eyes grossly normal to inspection.  No discharge or erythema, or obvious scleral/conjunctival abnormalities.  HENT: Normal cephalic/atraumatic.  External ears, nose and mouth without ulcers or lesions.  No nasal drainage visible.  RESP: No audible wheeze, cough, or visible cyanosis.  No visible retractions or increased work of breathing.    MS: No gross musculoskeletal defects noted.  Normal range of motion.  No visible edema.  SKIN: Visible skin clear. No significant rash, abnormal pigmentation or lesions.  NEURO: Cranial nerves grossly intact.  Mentation and speech appropriate for age.  PSYCH: Mentation appears normal, affect normal/bright, judgement and insight intact, normal speech and appearance well-groomed.     A/P:   Thyroid nodules - TSH needs to be checked. I have reviewed the natural history of thyroid nodules and thyroid cancer with the patient. Discussed need to FNA nodule #2.   -Schedule lab.   -Schedule biopsy of nodule #2.   Call [place] radiology scheduling for your procedure:  For scheduling in the [place] ([place], [facility], and [place]) call [phone] or [phone]      Start time 10:33 AM   Stop time 10:53 AM   [facility]  Patient in car.     Daniel Rosario MD on 12/15/2021 at 10:53 AM

## 2021-12-28 ENCOUNTER — TELEPHONE (OUTPATIENT)
Dept: ORTHOPEDICS | Facility: CLINIC | Age: 56
End: 2021-12-28
Payer: COMMERCIAL

## 2021-12-28 NOTE — TELEPHONE ENCOUNTER
Patient LVM 1456 requesting her Left shoulder MRI images be sent over to [facility] for her surgeon (who recently repaired her right shoulder) to evaluate.     1718  - I called the patient stating to her that I can send the images to the [employer] PACS system with her verbal approval.   She gave consent for the images to be pushed electronically to [facility].   I also discussed with her that her report should be available through her [employer] results.    Images pushed to [employer] PACS 12/28/21    SUKHWINDER Condon)

## 2022-01-25 ENCOUNTER — TRANSFERRED RECORDS (OUTPATIENT)
Dept: HEALTH INFORMATION MANAGEMENT | Facility: CLINIC | Age: 57
End: 2022-01-25
Payer: COMMERCIAL

## 2022-01-28 ENCOUNTER — ANCILLARY PROCEDURE (OUTPATIENT)
Dept: GENERAL RADIOLOGY | Facility: CLINIC | Age: 57
End: 2022-01-28
Attending: PODIATRIST
Payer: COMMERCIAL

## 2022-01-28 ENCOUNTER — OFFICE VISIT (OUTPATIENT)
Dept: ORTHOPEDICS | Facility: CLINIC | Age: 57
End: 2022-01-28
Payer: COMMERCIAL

## 2022-01-28 DIAGNOSIS — M85.679 BONE CYST OF FOOT: ICD-10-CM

## 2022-01-28 DIAGNOSIS — M79.675 PAIN OF TOE OF LEFT FOOT: Primary | ICD-10-CM

## 2022-01-28 DIAGNOSIS — M79.675 PAIN OF TOE OF LEFT FOOT: ICD-10-CM

## 2022-01-28 PROCEDURE — 99215 OFFICE O/P EST HI 40 MIN: CPT | Performed by: PODIATRIST

## 2022-01-28 PROCEDURE — 73660 X-RAY EXAM OF TOE(S): CPT | Mod: LT | Performed by: RADIOLOGY

## 2022-01-28 NOTE — LETTER
1/28/2022         RE: Benita Benz  [address] [facility] [address]        Dear Colleague,    Thank you for referring your patient, Benita Benz, to the [facility] [place]. Please see a copy of my visit note below.    Chief Complaint   Patient presents with     Follow Up     Pain, left great toe. Patient stated that she is having a lot of pain in her toe.             Allergies   Allergen Reactions     Simvastatin      Periferal neuropathy     Statin Drugs [Hmg-Coa-R Inhibitors] Other (See Comments)     Numbness is legs         Subjective: Benita is a 55 year old female who presents to the clinic today for a follow up of left hallux pain.  She relates that this is been present for quite a few weeks.  She relates that this pain is on and off.  It is quite severe for today.  It is usually 8 or 9 out of 10.  She relates that she has not bumped the toe.  However, when somebody stepped on it, it is extremely painful for her.    Objective  Data Unavailable Data Unavailable Data Unavailable Data Unavailable Data Unavailable 0 lbs 0 oz  DP and PT pulses 2/4 on the right. CRT is instant. + Pedal hair.   Gross sensation is intact on the right.   Pain noted today with palpation of the  left hallux nail.  No edema or ecchymosis is noted.  The nail appears to have some onychomycosis, however she has severe pain with palpation along the entire distal phalanx.    left hallux xrays indicated in 3 weightbearing views.    Solitary bone cyst noted to the distal phalanx.  This is reddened at the base of the nail.  This is where she is having pain.  No acute process noted.          Assessment: Benita is a 54 YO with left hallux pain.  She does have a cyst in the distal phalanx of the bone.  I recommended an MRI of the area.  She does agree to this.    Plan:   - Pt seen and evaluated  -  x-rays taken and discussed with her.    -We will get her an MRI.  - Pt to return to clinic status post MRI.   I spent 40 minutes today with patient care, documentation, chart review, care coordination.      SWAPNA MayerM

## 2022-01-28 NOTE — PROGRESS NOTES
Chief Complaint   Patient presents with     Follow Up     Pain, left great toe. Patient stated that she is having a lot of pain in her toe.             Allergies   Allergen Reactions     Simvastatin      Periferal neuropathy     Statin Drugs [Hmg-Coa-R Inhibitors] Other (See Comments)     Numbness is legs         Subjective: Benita is a 55 year old female who presents to the clinic today for a follow up of left hallux pain.  She relates that this is been present for quite a few weeks.  She relates that this pain is on and off.  It is quite severe for today.  It is usually 8 or 9 out of 10.  She relates that she has not bumped the toe.  However, when somebody stepped on it, it is extremely painful for her.    Objective  Data Unavailable Data Unavailable Data Unavailable Data Unavailable Data Unavailable 0 lbs 0 oz  DP and PT pulses 2/4 on the right. CRT is instant. + Pedal hair.   Gross sensation is intact on the right.   Pain noted today with palpation of the  left hallux nail.  No edema or ecchymosis is noted.  The nail appears to have some onychomycosis, however she has severe pain with palpation along the entire distal phalanx.    left hallux xrays indicated in 3 weightbearing views.    Solitary bone cyst noted to the distal phalanx.  This is reddened at the base of the nail.  This is where she is having pain.  No acute process noted.          Assessment: Benita is a 56 YO with left hallux pain.  She does have a cyst in the distal phalanx of the bone.  I recommended an MRI of the area.  She does agree to this.    Plan:   - Pt seen and evaluated  -  x-rays taken and discussed with her.    -We will get her an MRI.  - Pt to return to clinic status post MRI.  I spent 40 minutes today with patient care, documentation, chart review, care coordination.

## 2022-01-28 NOTE — NURSING NOTE
Reason For Visit:   Chief Complaint   Patient presents with     Follow Up     Pain, left great toe. Patient stated that she is having a lot of pain in her toe.        Pain Assessment  Patient Currently in Pain: Yes  Primary Pain Location: Foot (Left great toe.)        Allergies   Allergen Reactions     Simvastatin      Periferal neuropathy     Statin Drugs [Hmg-Coa-R Inhibitors] Other (See Comments)     Numbness is legs           Katya BRANDON Slaughter

## 2022-02-01 ENCOUNTER — ANCILLARY PROCEDURE (OUTPATIENT)
Dept: MRI IMAGING | Facility: CLINIC | Age: 57
End: 2022-02-01
Attending: PODIATRIST
Payer: COMMERCIAL

## 2022-02-01 DIAGNOSIS — M79.675 PAIN OF TOE OF LEFT FOOT: ICD-10-CM

## 2022-02-01 DIAGNOSIS — M85.679 BONE CYST OF FOOT: ICD-10-CM

## 2022-02-01 PROCEDURE — A9585 GADOBUTROL INJECTION: HCPCS | Mod: JW | Performed by: RADIOLOGY

## 2022-02-01 PROCEDURE — 73723 MRI JOINT LWR EXTR W/O&W/DYE: CPT | Mod: LT | Performed by: RADIOLOGY

## 2022-02-01 RX ORDER — GADOBUTROL 604.72 MG/ML
10 INJECTION INTRAVENOUS ONCE
Status: COMPLETED | OUTPATIENT
Start: 2022-02-01 | End: 2022-02-01

## 2022-02-01 RX ADMIN — GADOBUTROL 9.5 ML: 604.72 INJECTION INTRAVENOUS at 09:00

## 2022-02-07 ENCOUNTER — VIRTUAL VISIT (OUTPATIENT)
Dept: ORTHOPEDICS | Facility: CLINIC | Age: 57
End: 2022-02-07
Payer: COMMERCIAL

## 2022-02-07 DIAGNOSIS — M79.675 PAIN OF TOE OF LEFT FOOT: Primary | ICD-10-CM

## 2022-02-07 DIAGNOSIS — L60.0 ONYCHOCRYPTOSIS: ICD-10-CM

## 2022-02-07 PROCEDURE — 99441 PR PHYSICIAN TELEPHONE EVALUATION 5-10 MIN: CPT | Mod: 95 | Performed by: PODIATRIST

## 2022-02-07 NOTE — LETTER
"    2/7/2022         RE: Benita Benz  [address] [facility] [address]        Dear Colleague,    Thank you for referring your patient, Benita Benz, to the [facility]. Please see a copy of my visit note below.    Benita is a 56 year old who is being evaluated via a billable telephone visit.      What phone number would you like to be contacted at?  Mobile  How would you like to obtain your AVS? MyChart    Assessment & Plan     Pain of toe of left foot  I recommended that she get a total nail avulsion.  She will think on this.    Onychocryptosis  Same as above.        20 minutes spent on the date of the encounter doing chart review, review of test results, patient visit and documentation        BMI:   Estimated body mass index is 31.04 kg/m  as calculated from the following:    Height as of 8/25/21: 1.753 m (5' 9\").    Weight as of 11/29/21: 95.3 kg (210 lb 3.2 oz).       She will decide whether she wants to get a total nail avulsion.    No follow-ups on file.    Amos Arreola, BARRIE  [facility] [place]    Subjective   Benita is a 56 year old who presents for the following health issues   Encounter Diagnoses   Name Primary?     Pain of toe of left foot Yes     Onychocryptosis          HPI     Since the last visit, she has gotten a second opinion.  She had the MRI performed.  At the second opinion, she was recommended to have a total nail avulsion.  She does not want to do this as of right now.    Review of Systems   Constitutional, HEENT, cardiovascular, pulmonary, gi and gu systems are negative, except as otherwise noted.      Objective           Vitals:  No vitals were obtained today due to virtual visit.    Physical Exam   healthy, alert and no distress  PSYCH: Alert and oriented times 3; coherent speech, normal   rate and volume, able to articulate logical thoughts, able   to abstract reason, no tangential thoughts, no hallucinations "   or delusions  Her affect is normal  RESP: No cough, no audible wheezing, able to talk in full sentences  Remainder of exam unable to be completed due to telephone visits    Phone call duration: 5 minutes      Amos Arreola DPM

## 2022-02-09 NOTE — PROGRESS NOTES
"Benita is a 56 year old who is being evaluated via a billable telephone visit.      What phone number would you like to be contacted at?  Mobile  How would you like to obtain your AVS? MyChart    Assessment & Plan     Pain of toe of left foot  I recommended that she get a total nail avulsion.  She will think on this.    Onychocryptosis  Same as above.        20 minutes spent on the date of the encounter doing chart review, review of test results, patient visit and documentation        BMI:   Estimated body mass index is 31.04 kg/m  as calculated from the following:    Height as of 8/25/21: 1.753 m (5' 9\").    Weight as of 11/29/21: 95.3 kg (210 lb 3.2 oz).       She will decide whether she wants to get a total nail avulsion.    No follow-ups on file.    Amos Arreola WILFRIDO  [facility] [place]    Subjective   Benita is a 56 year old who presents for the following health issues   Encounter Diagnoses   Name Primary?     Pain of toe of left foot Yes     Onychocryptosis          HPI     Since the last visit, she has gotten a second opinion.  She had the MRI performed.  At the second opinion, she was recommended to have a total nail avulsion.  She does not want to do this as of right now.    Review of Systems   Constitutional, HEENT, cardiovascular, pulmonary, gi and gu systems are negative, except as otherwise noted.      Objective           Vitals:  No vitals were obtained today due to virtual visit.    Physical Exam   healthy, alert and no distress  PSYCH: Alert and oriented times 3; coherent speech, normal   rate and volume, able to articulate logical thoughts, able   to abstract reason, no tangential thoughts, no hallucinations   or delusions  Her affect is normal  RESP: No cough, no audible wheezing, able to talk in full sentences  Remainder of exam unable to be completed due to telephone visits                Phone call duration: 5 minutes    "

## 2022-03-14 ENCOUNTER — NURSE TRIAGE (OUTPATIENT)
Dept: NURSING | Facility: CLINIC | Age: 57
End: 2022-03-14
Payer: COMMERCIAL

## 2022-03-14 NOTE — TELEPHONE ENCOUNTER
Pt calling with concerns that she may have strep throat as she has had this multiple times in the past    Symptoms started 2 days ago and have become worse overtime. Has not been exposed to anybody with strep recently that she can recall    Rates pain 8/10 and thinks she is starting to get a headache     Denies SOB or difficulty breathing, has not checked a temperature but does not feel febrile    Looked at back of throat with a flash light and saw white spots on both sides if throat     Difficulty swallowing d/t pain but has been drinking some water and tea     Reports a minor cough that makes throat pain worse    Took an at-home covid test this morning that was negative     Pt advised to be seen by a provider within 24 hours per protocol and care advice given. Pt declined paging the on call provider and states that she would prefer to wait until the morning to be seen in clinic. Reviewed when to call back if needed and pt verbalized understanding    Transferred to scheduling     COVID 19 Nurse Triage Plan/Patient Instructions    Please be aware that novel coronavirus (COVID-19) may be circulating in the community. If you develop symptoms such as fever, cough, or SOB or if you have concerns about the presence of another infection including coronavirus (COVID-19), please contact your health care provider or visit [email or web address].     Disposition/Instructions    In-Person Visit with provider recommended. Reference Visit Selection Guide.    Thank you for taking steps to prevent the spread of this virus.  o Limit your contact with others.  o Wear a simple mask to cover your cough.  o Wash your hands well and often.    Resources    [facility]: About COVID-19: [email or web address]    CDC: What to Do If You're Sick: www.cdc.gov/coronavirus/2019-ncov/about/steps-when-sick.html    CDC: Ending Home Isolation: www.cdc.gov/coronavirus/2019-ncov/hcp/disposition-in-home-patients.html     CDC:  Caring for Someone: www.cdc.gov/coronavirus/2019-ncov/if-you-are-sick/care-for-someone.html     [facility]: Interim Guidance for Hospital Discharge to Home: www.health.[facility].[place]/diseases/coronavirus/hcp/hospdischarge.pdf    [facility] clinical trials (COVID-19 research studies): [email or web address]-trials     Below are the COVID-19 hotlines at the [place] Department of Health ([facility]). Interpreters are available.   o For health questions: Call 147-733-9141 or 1-895.258.3246 (7 a.m. to 7 p.m.)  o For questions about schools and childcare: Call 837-016-8224 or 1-106.168.6600 (7 a.m. to 7 p.m.)       Reason for Disposition    SEVERE (e.g., excruciating) throat pain    Additional Information    Negative: Severe difficulty breathing (e.g., struggling for each breath, speaks in single words, stridor)    Negative: Sounds like a life-threatening emergency to the triager    Negative: [1] Diagnosed strep throat AND [2] taking antibiotic AND [3] symptoms continue    Negative: Throat culture results, call about    Negative: Productive cough is main symptom    Negative: Non-productive cough is main symptom    Negative: Hoarseness is main symptom    Negative: Runny nose is main symptom    Negative: [1] Drooling or spitting out saliva (because can't swallow) AND [2] normal breathing    Negative: Unable to open mouth completely    Negative: [1] Difficulty breathing AND [2] not severe    Negative: Fever > 104 F (40 C)    Negative: [1] Refuses to drink anything AND [2] for > 12 hours    Negative: [1] Drinking very little AND [2] dehydration suspected (e.g., no urine > 12 hours, very dry mouth, very lightheaded)    Negative: Patient sounds very sick or weak to the triager    Protocols used: SORE THROAT-A-AH        Betsey Bee RN [facility] March 14, 2022 7:12 PM

## 2022-03-15 ENCOUNTER — OFFICE VISIT (OUTPATIENT)
Dept: URGENT CARE | Facility: URGENT CARE | Age: 57
End: 2022-03-15
Payer: COMMERCIAL

## 2022-03-15 VITALS
DIASTOLIC BLOOD PRESSURE: 82 MMHG | WEIGHT: 206.4 LBS | SYSTOLIC BLOOD PRESSURE: 134 MMHG | HEART RATE: 86 BPM | TEMPERATURE: 99.2 F | BODY MASS INDEX: 30.48 KG/M2 | OXYGEN SATURATION: 98 %

## 2022-03-15 DIAGNOSIS — R07.0 THROAT PAIN: ICD-10-CM

## 2022-03-15 DIAGNOSIS — J03.90 TONSILLITIS: Primary | ICD-10-CM

## 2022-03-15 DIAGNOSIS — H61.23 BILATERAL IMPACTED CERUMEN: ICD-10-CM

## 2022-03-15 LAB
DEPRECATED S PYO AG THROAT QL EIA: NEGATIVE
FLUAV AG SPEC QL IA: NEGATIVE
FLUBV AG SPEC QL IA: NEGATIVE
GROUP A STREP BY PCR: NOT DETECTED

## 2022-03-15 PROCEDURE — U0005 INFEC AGEN DETEC AMPLI PROBE: HCPCS | Performed by: NURSE PRACTITIONER

## 2022-03-15 PROCEDURE — 87651 STREP A DNA AMP PROBE: CPT | Performed by: NURSE PRACTITIONER

## 2022-03-15 PROCEDURE — 69209 REMOVE IMPACTED EAR WAX UNI: CPT | Mod: 50 | Performed by: NURSE PRACTITIONER

## 2022-03-15 PROCEDURE — U0003 INFECTIOUS AGENT DETECTION BY NUCLEIC ACID (DNA OR RNA); SEVERE ACUTE RESPIRATORY SYNDROME CORONAVIRUS 2 (SARS-COV-2) (CORONAVIRUS DISEASE [COVID-19]), AMPLIFIED PROBE TECHNIQUE, MAKING USE OF HIGH THROUGHPUT TECHNOLOGIES AS DESCRIBED BY CMS-2020-01-R: HCPCS | Performed by: NURSE PRACTITIONER

## 2022-03-15 PROCEDURE — 99213 OFFICE O/P EST LOW 20 MIN: CPT | Mod: 25 | Performed by: NURSE PRACTITIONER

## 2022-03-15 PROCEDURE — 87804 INFLUENZA ASSAY W/OPTIC: CPT | Performed by: NURSE PRACTITIONER

## 2022-03-15 RX ORDER — DEXAMETHASONE SODIUM PHOSPHATE 4 MG/ML
10 VIAL (ML) INJECTION ONCE
Status: CANCELLED | OUTPATIENT
Start: 2022-03-15 | End: 2022-03-15

## 2022-03-15 NOTE — PROGRESS NOTES
Assessment & Plan     Tonsillitis    Throat pain    - Streptococcus A Rapid Screen w/Reflex to PCR - Clinic Collect  - Group A Streptococcus PCR Throat Swab  - Symptomatic; Unknown COVID-19 Virus (Coronavirus) by PCR Nose  - Influenza A & B Antigen - Clinic Collect    Bilateral impacted cerumen    - VA REMOVAL IMPACTED CERUMEN IRRIGATION/LVG UNILAT     Reviewed negative rapid strep results during visit, PCR testing in process and COVID test in process, will notify if positive. Influenza testing negative. Discussed symptoms likely viral in nature and antibiotic not indicated at this time. Recommended rest, fluids, tylenol as needed, gargle warm salt water, throat lozenges, chloraseptic spray. Mono testing not indicated currently. She declines decadron for tonsillitis.     PROCEDURE:  Cerumen removal done in bilateral ears via lavage done by MA and supervised by myself    Patient reports immediate improvement in bilateral ear after lavage. Bilateral ear canal normal and TMs normal after lavage.    Follow-up with PCP if symptoms persist for 7 days, and sooner if symptoms worsen or new symptoms develop.     Discussed red flag symptoms which warrant immediate visit in emergency room    All questions were answered and patient verbalized understanding. AVS reviewed with patient.     Airam Leary, DNP, APRN, CNP 3/15/2022 11:21 AM  [facility] [place]            Bobby Joy is a 56 year old female who presents to clinic today for the following health issues:  Chief Complaint   Patient presents with     Pharyngitis     started three days go, mild cough      Patient presents for evaluation of sore throat for the past 3 days.  Symptoms have been worsening. Associated symptoms: mild cough, white exudate on tonsils. She had a headache which resolved. Denies fever, body aches, shortness of breath, ear pain, nasal congestion, fatigue. No known strep exposure. Pain is severe, but she is still able to  swallow and drink fluids though it has been getting more difficult. She tested negative for COVID yesterday with rapid test. She took tylenol yesterday which didn't seem to help. She has a history of diabetes.     Problem list, Medication list, Allergies, and Medical history reviewed in EPIC.    ROS:  Review of systems negative except for noted above        Objective    /82   Pulse 86   Temp 99.2  F (37.3  C)   Wt 93.6 kg (206 lb 6.4 oz)   SpO2 98%   BMI 30.48 kg/m    Physical Exam  Constitutional:       General: She is not in acute distress.     Appearance: She is not toxic-appearing or diaphoretic.   HENT:      Head: Normocephalic and atraumatic.      Right Ear: External ear normal. There is impacted cerumen.      Left Ear: External ear normal. There is impacted cerumen.      Nose:      Right Sinus: No maxillary sinus tenderness or frontal sinus tenderness.      Left Sinus: No maxillary sinus tenderness or frontal sinus tenderness.      Comments: Mild nasal congestion     Mouth/Throat:      Mouth: Mucous membranes are moist.      Pharynx: Posterior oropharyngeal erythema present.      Tonsils: Tonsillar exudate present. No tonsillar abscesses. 3+ on the right. 3+ on the left.      Comments: Mild oropharyngeal erythema, small amount whitish exudate bilateral tonsils  Eyes:      Conjunctiva/sclera: Conjunctivae normal.   Cardiovascular:      Rate and Rhythm: Normal rate and regular rhythm.      Heart sounds: Normal heart sounds.   Pulmonary:      Effort: Pulmonary effort is normal. No respiratory distress.      Breath sounds: Normal breath sounds. No wheezing, rhonchi or rales.   Lymphadenopathy:      Cervical: No cervical adenopathy.   Skin:     General: Skin is warm and dry.   Neurological:      Mental Status: She is alert.          Labs:  Results for orders placed or performed in visit on 03/15/22   Streptococcus A Rapid Screen w/Reflex to PCR - Clinic Collect     Status: Normal    Specimen: Throat;  Swab   Result Value Ref Range    Group A Strep antigen Negative Negative   Influenza A & B Antigen - Clinic Collect     Status: Normal    Specimen: Nose; Swab   Result Value Ref Range    Influenza A antigen Negative Negative    Influenza B antigen Negative Negative    Narrative    Test results must be correlated with clinical data. If necessary, results should be confirmed by a molecular assay or viral culture.

## 2022-03-15 NOTE — PATIENT INSTRUCTIONS
Patient Education     Self-Care for Sore Throats   Sore throats happen for many reasons, such as colds, allergies, cigarette smoke, air pollution, and infections caused by viruses or bacteria. In any case, your throat becomes red and sore. Your goal for self-care is to reduce your discomfort while giving your throat a chance to heal.  Moisten and soothe your throat  Tips include the following:    Try a sip of water first thing after waking up.    Keep your throat moist by drinking 6 or more glasses of clear liquids every day.    Run a cool-air humidifier in your room overnight.    Stay away from cigarette smoke.     Check the air quality index,if air pollution gives you a sore throat. On high pollution days, try to limit outdoor time.    Suck on throat lozenges, cough drops, hard candy, ice chips, or frozen fruit-juice bars. Use the sugar-free versions if your diet or medical condition requires them.  Gargle to ease irritation  Gargling every hour or 2 can ease irritation. Try gargling with 1 of these solutions:    1/4 teaspoon of salt in 1/2 cup of warm water    An over-the-counter anesthetic gargle  Use medicine for more relief  Over-the-counter medicine can reduce sore throat symptoms. Ask your pharmacist if you have questions about which medicine to use. To prevent possible medicine interactions, let the pharmacist know what medicines you take. To decrease symptoms:    Ease pain with anesthetic sprays. Aspirin or an aspirin substitute also helps. Remember, never give aspirin to anyone 18 or younger. Don't take aspirin if you are already taking blood thinners.     For sore throats caused by allergies, try antihistamines to block the allergic reaction.  Unless a sore throat is caused by a bacterial infection, antibiotics won t help you.  Prevent future sore throats  Prevention tips include:    Stop smoking or reduce contact with secondhand smoke. Smoke irritates the tender throat lining.    Limit contact with  pets and with allergy-causing substances, such as pollen and mold.    Wash your hands often when you re around someone with a sore throat or cold. This will keep viruses or bacteria from spreading.    Limit outdoor time when air pollution is bad.    Don t strain your vocal cords.  When to call your healthcare provider  Contact your healthcare provider if you have:    Fever of 100.4 F (38.0 C) or higher, or as directed by your healthcare provider    White spots on the throat    Great Trouble swallowing    A skin rash    Recent exposure to someone else with strep bacteria    Severe hoarseness and swollen glands in the neck or jaw  Call 911  Call 911 if any of the following occur:    Trouble breathing or catching your breath    Drooling and problems swallowing    Wheezing    Unable to talk    Feeling dizzy or faint    Feeling of doom  [employer] last reviewed this educational content on 9/1/2019 2000-2021 [employer]. All rights reserved. This information is not intended as a substitute for professional medical care. Always follow your healthcare professional's instructions.           Patient Education     Tonsillitis in Adults  Tonsillitis is swelling and redness (inflammation) of the tonsils. It happens when the tonsils are infected by a virus or a bacteria. Your tonsils are 2 pink, oval lymph glands at the back of your throat. They are part of your immune system, which helps your body fight infection. They react when germs get inside your nose and mouth.  Tonsillitis is very common. It is most often seen in children, but it can also occur in young adults.  The viruses and bacteria that cause tonsillitis can be easily passed from one person to another.    What causes tonsillitis?  Tonsillitis is most often caused by a virus.  Common viruses that cause tonsillitis include:    Cold viruses    Adenoviruses    Ankit-Barr virus    Infectious mononucleosis    Herpes simplex virus  (HSV)    Cytomegalovirus    Measles  In some cases tonsillitis is caused by a bacteria. Bacterial tonsillitis is often called strep throat. The most common type of bacteria that causes tonsillitis is GABS (Group A beta-hemolytic streptococcus). The bacteria is spread through droplets in the air. This happens when someone with the virus coughs or sneezes. It can also be spread by sharing food or drinks.  Symptoms of tonsillitis  Symptoms will depend on which type of tonsillitis you have. There are several types of tonsillitis.  Acute tonsillitis  Symptoms for this type often go away in a few days. But they can last up to 2 weeks. In some cases symptoms come back after treatment is done (acute recurrent tonsillitis). Symptoms include:    Fever    Sore throat    Bad breath    Trouble swallowing    Fluid loss (dehydration)    Sore lymph nodes in the neck    Tiredness    Snoring, sleep apnea, or breathing through the mouth    White patches, pus, or red tonsils    A red rash on the body  Chronic tonsillitis  For this type, the infection or inflammation lasts for a few months. Symptoms include:    Lasting sore throat    Bad breath    Lasting sore lymph nodes in the neck    Bacteria and debris collecting on the tonsils (called tonsil stones)  Peritonsillar abscess  This is a severe form of tonsillitis. It occurs when a pocket of pus (an abscess) forms around the tonsil. You need treatment right away. This can help stop the abscess from blocking your airway. Symptoms include:    Severe throat pain    Trouble opening the mouth    Drooling    Voice sounds muffled    One tonsil may look larger  Diagnosing tonsillitis  If you have symptoms, see your primary healthcare provider. Or see an ear, nose, and throat doctor (ENT or otolaryngologist).  The provider will ask about your symptoms. He or she will also check your ear, nose, and throat for any swelling and infection. The provider will then swab your tonsils or the back of your  throat. This sample can be checked in the provider s office for strep throat. This is called a rapid strep test. Results are ready in a few minutes. But there can be false negatives with this test. So the provider will likely also send the sample out to a lab for testing (throat culture). The lab results will take 24 hours or longer. But a throat culture is more accurate.  Treatment for tonsillitis  Treatment will depend on what is causing the tonsillitis. If it s caused by bacteria, then your provider may prescribe antibiotics to help you recover. Finish all of the medicine even if you start to feel better.  Tonsillitis caused by a virus can t be treated with antibiotics. This kind of infection often goes away on its own. Home care may be all that you need, with rest and fluids. Follow these tips to help ease your symptoms at home:    Get plenty of rest.    Drink lots of fluids, such as soup, broth, and tea with honey and lemon.    East soft foods such as ice cream, applesauce, and flavored gelatins.    Gargle with warm saltwater.    Use over-the-counter throat sprays or lozenges for throat pain.    Take over-the-counter medicine for fever and pain, as directed.    Use a cool-mist humidifier to keep the air moist.  In severe cases, a person may be dehydrated or have a blocked airway. They may need to be hospitalized.  Surgery to remove the tonsils (tonsillectomy) may be needed if you have any of these:    Chronic tonsillitis    Tonsillitis that keeps coming back    Obstructive sleep apnea    Acute recurrent tonsillitis  If you have a peritonsillar abscess, surgery may be done to drain the abscess.  Preventing tonsillitis  Tonsillitis itself can t spread. But the virus and bacteria that cause it can be passed to other people.  No vaccine or medicine can prevent tonsillitis. These tips can help keep you from spreading or catching an illness that can cause tonsillitis:    Stay away from anyone with tonsillitis or a  sore throat as much as possible.    Don't share utensils, drinking glasses, toothbrushes, or other personal objects with anyone who has tonsillitis or a sore throat.    Wash your hands correctly. Wash them often with soap and water often. Use hand  when you can t wash your hands.    Cover your mouth when you cough or sneeze.  Call 911  Call 911 if you have any of these symptoms:    Trouble breathing or speaking    Trouble swallowing or opening your mouth    Swollen mouth and throat    Drooling  When to call your healthcare provider  Call your healthcare provider if you have any of these symptoms:    Fever of 100.4 F (38 C) or higher, or as directed by your provider    A lump that gets larger    Worsening throat pain or neck pain    Unable to open your mouth fully (called lockjaw or trismus)    Neck stiffness    Bleeding    Painful swallowing    Feeling very ill or sick    Sore throat for more than 2 days     [employer] last reviewed this educational content on 7/1/2019 2000-2021 [employer]. All rights reserved. This information is not intended as a substitute for professional medical care. Always follow your healthcare professional's instructions.

## 2022-03-16 ENCOUNTER — TELEPHONE (OUTPATIENT)
Dept: FAMILY MEDICINE | Facility: CLINIC | Age: 57
End: 2022-03-16
Payer: COMMERCIAL

## 2022-03-16 DIAGNOSIS — R07.0 THROAT PAIN: Primary | ICD-10-CM

## 2022-03-16 LAB — SARS-COV-2 RNA RESP QL NAA+PROBE: NEGATIVE

## 2022-03-16 RX ORDER — PREDNISONE 20 MG/1
40 TABLET ORAL DAILY
Qty: 10 TABLET | Refills: 0 | Status: SHIPPED | OUTPATIENT
Start: 2022-03-16 | End: 2022-03-21

## 2022-03-16 NOTE — TELEPHONE ENCOUNTER
Please call patient and inform I sent  prescription for prednisone  Steroid to decrease swelling of throat   Come in ASAP if severe or worsening symptoms   Especially if unable to keep herself hydrated need to be seen or go to the ER  Kathie Benoit M.D.     Here's some information about prednisone  We can mail to her or if she signs up to [facility] can be send via [facility]    Prednisone Oral Tablet 20 mg  Uses  This medicine is used for the following purposes:    allergic reaction    autoimmune disorder    blood disorder    endocrine disorder    inflammatory disease    immune suppression    cancer    COVID-19 (coronavirus)  Instructions  Take the medicine with food.  You may take with food to prevent stomach upset.  Store at room temperature in a dry place. Do not keep in the bathroom.  Keep the medicine away from heat and light.  It is important that you keep taking each dose of this medicine on time even if you are feeling well.  If you forget to take a dose on time, take it as soon as you remember. If it is almost time for the next dose, do not take the missed dose. Return to your normal dosing schedule. Do not take 2 doses of this medicine at one time.  Please tell your doctor and pharmacist about all the medicines you take. Include both prescription and over-the-counter medicines. Also tell them about any vitamins, herbal medicines, or anything else you take for your health.  If your symptoms do not improve or they worsen while on this medicine, contact your doctor.  Do not suddenly stop taking this medicine. Check with your doctor before stopping.  This medicine may affect your blood sugar levels. If you have diabetes, talk to your doctor before changing the dose of your diabetes medicine.  This medicine may affect the strength of your bones. If you have or are at increased risk for developing osteoporosis (weakening of the bones), your doctor may recommend adding foods containing calcium and vitamin D while on  this medicine. Please talk to your doctor for more information.  It is very important that you keep all appointments for medical exams and tests while on this medicine.  Cautions  Tell your doctor and pharmacist if you ever had an allergic reaction to a medicine. Symptoms of an allergic reaction can include trouble breathing, skin rash, itching, swelling, or severe dizziness.  This medicine may cause serious bleeding from the stomach or bowels. Stop this medicine and call your doctor immediately if you see any signs of bleeding. Bleeding can cause pain in the stomach, vomiting up liquid that looks like coffee grounds, and red or dark tarry stools.  Do not use the medication any more than instructed.  Speak with your doctor before taking any medicine with aspirin.  Please check with your doctor before drinking alcohol while on this medicine.  This medicine may reduce your body's ability to fight infections. Try to avoid contact with people with colds, flu or other infections.  Speak with your health care provider before receiving any vaccinations.  Tell the doctor or pharmacist if you are pregnant, planning to be pregnant, or breastfeeding.  Ask your pharmacist if this medicine can interact with any of your other medicines. Be sure to tell them about all the medicines you take.  Please tell all your doctors and dentists that you are on this medicine before they provide care.  Do not start or stop any other medicines without first speaking to your doctor or pharmacist.  Do not share this medicine with anyone who has not been prescribed this medicine.  Side Effects  The following is a list of some common side effects from this medicine. Please speak with your doctor about what you should do if you experience these or other side effects.    agitated feeling or trouble sleeping    increased appetite    increased risk of bleeding    high blood sugar    high blood pressure    increased risk for an  infection    nausea    stomach upset or abdominal pain    vomiting  Call your doctor or get medical help right away if you notice any of these more serious side effects:    bone pain    unusual bruising or discoloration on skin    changes in memory, mood, or thinking    depression or feeling sad    swelling of the legs, feet, and hands    menstruation changes (missed or fewer periods)    mood changes    thinning of the skin    unusual or unexplained tiredness or weakness    blurring or changes of vision    sudden or unexplained weight gain  A few people may have an allergic reactions to this medicine. Symptoms can include difficulty breathing, skin rash, itching, swelling, or severe dizziness. If you notice any of these symptoms, seek medical help quickly.    Kathie Benoit M.D.

## 2022-03-16 NOTE — TELEPHONE ENCOUNTER
Ph.[phone]     Patient calling with extremely sore throat.  Patient saw tests were negative.    Patient now wanting the solution discussed in visit for relief of pain    Please advise    Ivy Salvador

## 2022-05-26 ENCOUNTER — DOCUMENTATION ONLY (OUTPATIENT)
Dept: LAB | Facility: CLINIC | Age: 57
End: 2022-05-26

## 2022-05-26 ENCOUNTER — ANCILLARY PROCEDURE (OUTPATIENT)
Dept: MAMMOGRAPHY | Facility: CLINIC | Age: 57
End: 2022-05-26
Attending: PHYSICIAN ASSISTANT
Payer: COMMERCIAL

## 2022-05-26 DIAGNOSIS — Z12.31 VISIT FOR SCREENING MAMMOGRAM: ICD-10-CM

## 2022-05-26 DIAGNOSIS — F51.01 PRIMARY INSOMNIA: ICD-10-CM

## 2022-05-26 DIAGNOSIS — I10 BENIGN ESSENTIAL HYPERTENSION: Primary | ICD-10-CM

## 2022-05-26 PROCEDURE — 77063 BREAST TOMOSYNTHESIS BI: CPT | Mod: GC | Performed by: STUDENT IN AN ORGANIZED HEALTH CARE EDUCATION/TRAINING PROGRAM

## 2022-05-26 PROCEDURE — 77067 SCR MAMMO BI INCL CAD: CPT | Mod: GC | Performed by: STUDENT IN AN ORGANIZED HEALTH CARE EDUCATION/TRAINING PROGRAM

## 2022-05-26 NOTE — PROGRESS NOTES
Benita Benz has an upcoming lab appointment:    Future Appointments   Date Time Provider Department Center   5/29/2022 11:30 AM AN LAB [facility]     Patient is scheduled for the following lab(s): there are no orders in the patient chart.    There is no order available. Please review and place either future orders or HMPO (Review of Health Maintenance Protocol Orders), as appropriate.    There are no preventive care reminders to display for this patient.  Cher Gomez

## 2022-05-29 ENCOUNTER — LAB (OUTPATIENT)
Dept: LAB | Facility: CLINIC | Age: 57
End: 2022-05-29
Payer: COMMERCIAL

## 2022-05-29 DIAGNOSIS — I10 BENIGN ESSENTIAL HYPERTENSION: ICD-10-CM

## 2022-05-29 DIAGNOSIS — F51.01 PRIMARY INSOMNIA: ICD-10-CM

## 2022-05-29 PROCEDURE — 36415 COLL VENOUS BLD VENIPUNCTURE: CPT

## 2022-05-29 PROCEDURE — 85025 COMPLETE CBC W/AUTO DIFF WBC: CPT

## 2022-05-29 PROCEDURE — 80048 BASIC METABOLIC PNL TOTAL CA: CPT

## 2022-05-29 PROCEDURE — 84443 ASSAY THYROID STIM HORMONE: CPT

## 2022-05-29 PROCEDURE — 80061 LIPID PANEL: CPT

## 2022-05-31 LAB
ANION GAP SERPL CALCULATED.3IONS-SCNC: 7 MMOL/L (ref 3–14)
BASOPHILS # BLD AUTO: 0 10E3/UL (ref 0–0.2)
BASOPHILS NFR BLD AUTO: 1 %
BUN SERPL-MCNC: 15 MG/DL (ref 7–30)
CALCIUM SERPL-MCNC: 9.5 MG/DL (ref 8.5–10.1)
CHLORIDE BLD-SCNC: 106 MMOL/L (ref 94–109)
CHOLEST SERPL-MCNC: 231 MG/DL
CO2 SERPL-SCNC: 27 MMOL/L (ref 20–32)
CREAT SERPL-MCNC: 0.91 MG/DL (ref 0.52–1.04)
EOSINOPHIL # BLD AUTO: 0.2 10E3/UL (ref 0–0.7)
EOSINOPHIL NFR BLD AUTO: 4 %
ERYTHROCYTE [DISTWIDTH] IN BLOOD BY AUTOMATED COUNT: 14.1 % (ref 10–15)
FASTING STATUS PATIENT QL REPORTED: YES
GFR SERPL CREATININE-BSD FRML MDRD: 73 ML/MIN/1.73M2
GLUCOSE BLD-MCNC: 109 MG/DL (ref 70–99)
HCT VFR BLD AUTO: 38.1 % (ref 35–47)
HDLC SERPL-MCNC: 42 MG/DL
HGB BLD-MCNC: 12 G/DL (ref 11.7–15.7)
IMM GRANULOCYTES # BLD: 0 10E3/UL
IMM GRANULOCYTES NFR BLD: 0 %
LDLC SERPL CALC-MCNC: 138 MG/DL
LYMPHOCYTES # BLD AUTO: 4.1 10E3/UL (ref 0.8–5.3)
LYMPHOCYTES NFR BLD AUTO: 60 %
MCH RBC QN AUTO: 28.2 PG (ref 26.5–33)
MCHC RBC AUTO-ENTMCNC: 31.5 G/DL (ref 31.5–36.5)
MCV RBC AUTO: 90 FL (ref 78–100)
MONOCYTES # BLD AUTO: 0.5 10E3/UL (ref 0–1.3)
MONOCYTES NFR BLD AUTO: 8 %
NEUTROPHILS # BLD AUTO: 1.8 10E3/UL (ref 1.6–8.3)
NEUTROPHILS NFR BLD AUTO: 27 %
NONHDLC SERPL-MCNC: 189 MG/DL
NRBC # BLD AUTO: 0 10E3/UL
NRBC BLD AUTO-RTO: 0 /100
PLATELET # BLD AUTO: 356 10E3/UL (ref 150–450)
POTASSIUM BLD-SCNC: 4.1 MMOL/L (ref 3.4–5.3)
RBC # BLD AUTO: 4.25 10E6/UL (ref 3.8–5.2)
SODIUM SERPL-SCNC: 140 MMOL/L (ref 133–144)
TRIGL SERPL-MCNC: 253 MG/DL
TSH SERPL DL<=0.005 MIU/L-ACNC: 1.55 MU/L (ref 0.4–4)
WBC # BLD AUTO: 6.8 10E3/UL (ref 4–11)

## 2022-05-31 NOTE — RESULT ENCOUNTER NOTE
Nia Joy,       Your recent test results are attached, if you have any questions or concerns please feel free to contact me via e-mail or call [phone].  Thyroid is normal.  Blood sugar is slightly elevated.  Kidney function is normal.  Cholesterol is stable.  White and red blood cell counts normal.     It was a pleasure to see you at your recent office visit.      Sincerely,  Stella Santiago PA-C

## 2022-07-10 ENCOUNTER — HEALTH MAINTENANCE LETTER (OUTPATIENT)
Age: 57
End: 2022-07-10

## 2022-09-11 ENCOUNTER — HEALTH MAINTENANCE LETTER (OUTPATIENT)
Age: 57
End: 2022-09-11

## 2022-09-12 DIAGNOSIS — K21.9 GASTROESOPHAGEAL REFLUX DISEASE WITHOUT ESOPHAGITIS: ICD-10-CM

## 2022-09-12 DIAGNOSIS — I10 BENIGN ESSENTIAL HYPERTENSION: ICD-10-CM

## 2022-09-12 DIAGNOSIS — F51.01 PRIMARY INSOMNIA: ICD-10-CM

## 2022-09-12 DIAGNOSIS — E78.5 DYSLIPIDEMIA WITH HIGH LDL AND LOW HDL: ICD-10-CM

## 2022-09-13 RX ORDER — PRAVASTATIN SODIUM 40 MG
TABLET ORAL
Qty: 90 TABLET | Refills: 0 | OUTPATIENT
Start: 2022-09-13

## 2022-09-13 RX ORDER — ZOLPIDEM TARTRATE 10 MG/1
TABLET ORAL
Qty: 30 TABLET | Refills: 0 | OUTPATIENT
Start: 2022-09-13

## 2022-09-13 RX ORDER — AMLODIPINE BESYLATE 10 MG/1
TABLET ORAL
Qty: 90 TABLET | Refills: 0 | OUTPATIENT
Start: 2022-09-13

## 2022-09-13 RX ORDER — LISINOPRIL 40 MG/1
TABLET ORAL
Qty: 90 TABLET | Refills: 0 | OUTPATIENT
Start: 2022-09-13

## 2022-09-13 NOTE — TELEPHONE ENCOUNTER
Pt returned call to clinic. RN relayed provider message below as written. RN transferred pt to  to assist with appointment scheduling.    Pt agrees with this plan, but will need refills (pended) to be able to continue medications until the next available appointment date:    Next 5 appointments (look out 90 days)    Oct 07, 2022 10:00 AM  (Arrive by 9:40 AM)  Adult Preventative Visit with Stella Santiago PA-C  [facility] ([facility] ) [id number] [place] [facility] [address] [phone]        Tess Guadalupe, SHREYAN, RN

## 2022-09-13 NOTE — TELEPHONE ENCOUNTER
Called and LVM to schedule an appointment for med refills  Sally PULIDO  Patient Representative  [phone]

## 2022-09-14 RX ORDER — PRAVASTATIN SODIUM 40 MG
40 TABLET ORAL DAILY
Qty: 90 TABLET | Refills: 0 | Status: SHIPPED | OUTPATIENT
Start: 2022-09-14 | End: 2022-10-07

## 2022-09-14 RX ORDER — LISINOPRIL 40 MG/1
40 TABLET ORAL DAILY
Qty: 90 TABLET | Refills: 0 | Status: SHIPPED | OUTPATIENT
Start: 2022-09-14 | End: 2022-10-07

## 2022-09-14 RX ORDER — AMLODIPINE BESYLATE 10 MG/1
10 TABLET ORAL DAILY
Qty: 90 TABLET | Refills: 0 | Status: SHIPPED | OUTPATIENT
Start: 2022-09-14 | End: 2022-10-07

## 2022-09-14 RX ORDER — ZOLPIDEM TARTRATE 10 MG/1
10 TABLET ORAL
Qty: 30 TABLET | Refills: 0 | OUTPATIENT
Start: 2022-09-14

## 2022-09-14 NOTE — TELEPHONE ENCOUNTER
I refilled all but the ambien. Under controlled substance [place] registry I do not see that she has filled this medication in the last year.  Was she [place] it out of state please provide clarification.    I refilled the others, Stella

## 2022-09-15 NOTE — TELEPHONE ENCOUNTER
Spoke with patient and she states she has not had this Rx filled in a long time and did not have it refilled out of state just wanted to get it refilled again.     Bhargavi Lauren, Patient Representative - [facility]

## 2022-09-15 NOTE — TELEPHONE ENCOUNTER
Spoke with patient and relayed message. Patient understands. No further action needed.  Li Jennings,     [facility]

## 2022-09-15 NOTE — TELEPHONE ENCOUNTER
Makes sense, she will need to wait for office visit to discuss that medication/re-start it. Stella Santiago PA-C

## 2022-10-03 NOTE — PROGRESS NOTES
SUBJECTIVE:   CC: Benita is an 57 year old who presents for preventive health visit.     Patient has been advised of split billing requirements and indicates understanding: Yes     - Med check  NOT DUE FOR pap.     Up to date on mammo and colon screening.     Healthy Habits:     Getting at least 3 servings of Calcium per day:  Yes    Bi-annual eye exam:  Yes    Dental care twice a year:  Yes    Sleep apnea or symptoms of sleep apnea:  None    Diet:  Low salt and Low fat/cholesterol    Frequency of exercise:  None    Taking medications regularly:  Yes    Medication side effects:  None    PHQ-2 Total Score: 1    Additional concerns today:  Yes      Meds:       Mild asthma-Albuterol-not needing much at all. Doing well.     htn-No chest pain, shortness of breath, edema, PND, or orthopnea. No dizziness or vision changes. No side effects from medications. Blood pressure has been stable on medication.      gerd- on ppi. No dark or bloody stools. bmi 32.     Insomnia-  Was doing better for awhile off ambien but has felt she needed off and on again lately. Hard to shut her brain off sometimes.  Wouldn't need nightly. No side effects that she recalls.    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       High cholesterol-on statin. No side effects. Will check labs today.       [place]- [place] concerns.       Fasting? yes      Today's PHQ-2 Score:   PHQ-2 ( 1999 Pf[employer]) 10/7/2022   Q1: Little interest or pleasure in doing things 1   Q2: Feeling down, depressed or hopeless 0   PHQ-2 Score 1   PHQ-2 Total Score (12-17 Years)- Positive if 3 or more points; Administer PHQ-A if positive -   Q1: Little interest or pleasure in doing things Several days   Q2: Feeling down, depressed or hopeless Not at all   PHQ-2 Score 1       Abuse: Current or Past (Physical, Sexual or Emotional) - No  Do you feel safe in your environment? Yes        Social History     Tobacco Use     Smoking status:  Former Smoker     Packs/day: 1.50     Years: 2.00     Pack years: 3.00     Types: Cigarettes     Start date: 1997     Quit date: 1999     Years since quittin.3     Smokeless tobacco: Never Used   Substance Use Topics     Alcohol use: Not Currently     Comment: rare     If you drink alcohol do you typically have >3 drinks per day or >7 drinks per week? No    Alcohol Use 10/7/2022   Prescreen: >3 drinks/day or >7 drinks/week? Not Applicable   Prescreen: >3 drinks/day or >7 drinks/week? -   No flowsheet data found.    Reviewed orders with patient.  Reviewed health maintenance and updated orders accordingly - Yes  Lab work is in process  Labs reviewed in EPIC  BP Readings from Last 3 Encounters:   10/07/22 103/70   03/15/22 134/82   21 136/85    Wt Readings from Last 3 Encounters:   10/07/22 93.9 kg (207 lb)   03/15/22 93.6 kg (206 lb 6.4 oz)   21 95.3 kg (210 lb 3.2 oz)                  Patient Active Problem List   Diagnosis     Benign essential hypertension     Mild intermittent asthma without complication     Primary insomnia     Episodic tension-type headache, not intractable     Plantar fasciitis of left foot     Low back pain, unspecified back pain laterality, unspecified chronicity, unspecified whether sciatica present     Tear of right rotator cuff, unspecified tear extent, unspecified whether traumatic     High cholesterol     Past Surgical History:   Procedure Laterality Date     BIOPSY       COLONOSCOPY       EYE SURGERY  2014    toe surgery     ORTHOPEDIC SURGERY         Social History     Tobacco Use     Smoking status: Former Smoker     Packs/day: 1.50     Years: 2.00     Pack years: 3.00     Types: Cigarettes     Start date: 1997     Quit date: 1999     Years since quittin.3     Smokeless tobacco: Never Used   Substance Use Topics     Alcohol use: Not Currently     Comment: rare     Family History   Problem Relation Age of Onset     Diabetes Mother      Thyroid  Disease Mother      Sarcoidosis Mother      Asthma Mother      Obesity Mother      Hypertension Father      Hyperlipidemia Father      Obesity Father      Obesity Brother      Obesity Sister          Current Outpatient Medications   Medication Sig Dispense Refill     albuterol (PROAIR HFA/PROVENTIL HFA/VENTOLIN HFA) 108 (90 Base) MCG/ACT inhaler INHALE 2 PUFFS BY MOUTH EVERY 6 HOURS AS NEEDED FOR SHORTNESS OF BREATH /DYSPNEA  OR  WHEEZING 18 g 4     amLODIPine (NORVASC) 10 MG tablet Take 1 tablet (10 mg) by mouth daily 90 tablet 3     diclofenac (VOLTAREN) 1 % topical gel Apply 2 grams to hands four times daily using enclosed dosing card. (Patient taking differently: as needed Apply 2 grams to hands four times daily using enclosed dosing card.) 100 g 1     Flaxseed, Linseed, (FLAX SEED OIL) 1000 MG capsule Take 1 capsule by mouth as needed        lisinopril (ZESTRIL) 40 MG tablet Take 1 tablet (40 mg) by mouth daily 90 tablet 3     meclizine (ANTIVERT) 25 MG tablet TAKE 1 TABLET BY MOUTH EVERY 6 HOURS AS NEEDED FOR DIZZINESS 30 tablet 0     Multiple Vitamins-Minerals (MULTIVITAMIN ADULT PO)        Omega-3 Fatty Acids (OMEGA-3 FISH OIL PO)        omeprazole (PRILOSEC) 20 MG DR capsule Take 1 capsule (20 mg) by mouth daily 90 capsule 3     order for DME Equipment being ordered: [id number]   Walking Boot, [employer], MED 1 Device 0     pravastatin (PRAVACHOL) 40 MG tablet Take 1 tablet (40 mg) by mouth daily 90 tablet 3     triamcinolone (KENALOG) 0.1 % cream Apply sparingly to affected area three times daily as needed for up to 14 days 45 g 0     zolpidem (AMBIEN) 10 MG tablet Take 1 tablet (10 mg) by mouth nightly as needed for sleep 30 tablet 5     Allergies   Allergen Reactions     Simvastatin      Periferal neuropathy     Statin Drugs [Hmg-Coa-R Inhibitors] Other (See Comments)     Numbness is legs       Breast Cancer Screening:  Any new diagnosis of family breast, ovarian, or bowel cancer? No    FHS-7:    Breast CA Risk Assessment (FHS-7) 2021   Did any of your first-degree relatives have breast or ovarian cancer? Yes Yes No   Did any of your relatives have bilateral breast cancer? Unknown Unknown No   Did any man in your family have breast cancer? - No No   Did any woman in your family have breast and ovarian cancer? - Yes No   Did any woman in your family have breast cancer before age 50 y? - Unknown No   Do you have 2 or more relatives with breast and/or ovarian cancer? - Yes No   Do you have 2 or more relatives with breast and/or bowel cancer? - No No       Pertinent mammograms are reviewed under the imaging tab.    History of abnormal Pap smear: NO - age 30-65 PAP every 5 years with negative HPV co-testing recommended  PAP / HPV Latest Ref Rng & Units 2021 1/10/2017   PAP (Historical) - NIL NIL   HPV16 NEG:Negative Negative Negative   HPV18 NEG:Negative Negative Negative   HRHPV NEG:Negative Negative Negative     Reviewed and updated as needed this visit by clinical staff   Tobacco  Allergies  Meds   Med Hx  Surg Hx  Fam Hx  Soc Hx          Reviewed and updated as needed this visit by Provider                   Past Medical History:   Diagnosis Date     Arthritis      High cholesterol      Hypertension      Uncomplicated asthma       Past Surgical History:   Procedure Laterality Date     BIOPSY       COLONOSCOPY       EYE SURGERY      toe surgery     ORTHOPEDIC SURGERY       OB History    Para Term  AB Living   4 4 0 0 0 0   SAB IAB Ectopic Multiple Live Births   0 0 0 0 0      # Outcome Date GA Lbr Panchito/2nd Weight Sex Delivery Anes PTL Lv   4 Para            3 Para            2 Para            1 Para                Review of Systems   Constitutional: Negative for chills and fever.   HENT: Negative for congestion, ear pain, hearing loss and sore throat.    Eyes: Negative for pain and visual disturbance.   Respiratory: Negative for cough and shortness of  "breath.    Cardiovascular: Negative for chest pain and palpitations.   Gastrointestinal: Positive for heartburn. Negative for abdominal pain, constipation, diarrhea, hematochezia and nausea.   Breasts:  Negative for tenderness, breast mass and discharge.   Genitourinary: Negative for dysuria, frequency, genital sores, hematuria, pelvic pain, urgency, vaginal bleeding and vaginal discharge.   Musculoskeletal: Positive for arthralgias, joint swelling and myalgias.   Skin: Negative for rash.   Neurological: Negative for dizziness, weakness, headaches and paresthesias.   Psychiatric/Behavioral: Negative for mood changes. The patient is not nervous/anxious.           OBJECTIVE:   /70   Pulse 82   Temp 98.1  F (36.7  C) (Tympanic)   Resp 16   Ht 1.702 m (5' 7\")   Wt 93.9 kg (207 lb)   LMP 08/16/2021 (Approximate)   SpO2 99%   Breastfeeding No   BMI 32.42 kg/m    Physical Exam  GENERAL: alert, no distress and obese  EYES: Eyes grossly normal to inspection, PERRL and conjunctivae and sclerae normal  HENT: ear canals and TM's normal, nose and mouth without ulcers or lesions  NECK: no adenopathy, no asymmetry, masses, or scars and thyroid normal to palpation  RESP: lungs clear to auscultation - no rales, rhonchi or wheezes  BREAST: normal without masses, tenderness or nipple discharge and no palpable axillary masses or adenopathy  CV: regular rate and rhythm, normal S1 S2, no S3 or S4, no murmur, click or rub, no peripheral edema and peripheral pulses strong  ABDOMEN: soft, nontender, no hepatosplenomegaly, no masses and bowel sounds normal  MS: no gross musculoskeletal defects noted, no edema  SKIN: no suspicious lesions or rashes  NEURO: Normal strength and tone, mentation intact and speech normal  PSYCH: mentation appears normal, affect normal/bright        ASSESSMENT/PLAN:   (Z00.00) Routine general medical examination at a health care facility  (primary encounter diagnosis)  Comment:   Plan:     (I10) " "Benign essential hypertension  Comment: to goal  Plan: Comprehensive metabolic panel (BMP + Alb, Alk         Phos, ALT, AST, Total. Bili, TP), amLODIPine         (NORVASC) 10 MG tablet, lisinopril (ZESTRIL) 40        MG tablet            (J45.20) Mild intermittent asthma without complication  Comment: doing well  Plan: albuterol (PROAIR HFA/PROVENTIL HFA/VENTOLIN         HFA) 108 (90 Base) MCG/ACT inhaler            (E78.00) High cholesterol  Comment:  I will f/u with labs  Plan: Lipid panel reflex to direct LDL Fasting            (E78.5) Dyslipidemia with high LDL and low HDL  Comment:   Plan: pravastatin (PRAVACHOL) 40 MG tablet            (F51.01) Primary insomnia  Comment:   Plan: zolpidem (AMBIEN) 10 MG tablet        Worsening  We discussed side effects and risks of this medication today including addiction, tolerance, increased sedation, respiratory depression, and possibly overdose if not taken as directed.   They will not mix this medication with alcohol or other sedating medications. They will not drive after taking this medication.  Patient states understanding. They verbally agreed to use their medication responsibly.       (K21.9) Gastroesophageal reflux disease without esophagitis  Comment:   Plan: omeprazole (PRILOSEC) 20 MG DR capsule        stable    (Z13.1) Screening for diabetes mellitus  Comment:   Plan:     Patient has been advised of split billing requirements and indicates understanding: Yes    COUNSELING:  Reviewed preventive health counseling, as reflected in patient instructions       Regular exercise       Healthy diet/nutrition    Estimated body mass index is 32.42 kg/m  as calculated from the following:    Height as of this encounter: 1.702 m (5' 7\").    Weight as of this encounter: 93.9 kg (207 lb).    Weight management plan: Discussed healthy diet and exercise guidelines    She reports that she quit smoking about 23 years ago. Her smoking use included cigarettes. She started smoking " about 25 years ago. She has a 3.00 pack-year smoking history. She has never used smokeless tobacco.    Patient Instructions     Preventive Health Recommendations  Female Ages 50 - 64    Yearly exam: See your health care provider every year in order to  o Review health changes.   o Discuss preventive care.    o Review your medicines if your doctor has prescribed any.      Get a Pap test every three years (unless you have an abnormal result and your provider advises testing more often).    If you get Pap tests with HPV test, you only need to test every 5 years, unless you have an abnormal result.     You do not need a Pap test if your uterus was removed (hysterectomy) and you have not had cancer.    You should be tested each year for STDs (sexually transmitted diseases) if you're at risk.     Have a mammogram every 1 to 2 years.    Have a colonoscopy at age 50, or have a yearly FIT test (stool test). These exams screen for colon cancer.      Have a cholesterol test every 5 years, or more often if advised.    Have a diabetes test (fasting glucose) every three years. If you are at risk for diabetes, you should have this test more often.     If you are at risk for osteoporosis (brittle bone disease), think about having a bone density scan (DEXA).    Shots: Get a flu shot each year. Get a tetanus shot every 10 years.    Nutrition:     Eat at least 5 servings of fruits and vegetables each day.    Eat whole-grain bread, whole-wheat pasta and brown rice instead of white grains and rice.    Get adequate Calcium and Vitamin D.     Lifestyle    Exercise at least 150 minutes a week (30 minutes a day, 5 days a week). This will help you control your weight and prevent disease.    Limit alcohol to one drink per day.    No smoking.     Wear sunscreen to prevent skin cancer.     See your dentist every six months for an exam and cleaning.    See your eye doctor every 1 to 2 years.      Billin additional not on preventative   min  spent on patient today including chart review, med management,  history, exam, and explaining treatment plan and follow-up.     Counseling Resources:  ATP IV Guidelines  Pooled Cohorts Equation Calculator  Breast Cancer Risk Calculator  BRCA-Related Cancer Risk Assessment: FHS-7 Tool  FRAX Risk Assessment  ICSI Preventive Guidelines  Dietary Guidelines for Americans, 2010  USDA's MyPlate  ASA Prophylaxis  Lung CA Screening    DAVID Mancia [facility]

## 2022-10-07 ENCOUNTER — OFFICE VISIT (OUTPATIENT)
Dept: FAMILY MEDICINE | Facility: CLINIC | Age: 57
End: 2022-10-07
Payer: COMMERCIAL

## 2022-10-07 VITALS
HEART RATE: 82 BPM | WEIGHT: 207 LBS | BODY MASS INDEX: 32.49 KG/M2 | DIASTOLIC BLOOD PRESSURE: 70 MMHG | TEMPERATURE: 98.1 F | SYSTOLIC BLOOD PRESSURE: 103 MMHG | RESPIRATION RATE: 16 BRPM | OXYGEN SATURATION: 99 % | HEIGHT: 67 IN

## 2022-10-07 DIAGNOSIS — Z00.00 ROUTINE GENERAL MEDICAL EXAMINATION AT A HEALTH CARE FACILITY: Primary | ICD-10-CM

## 2022-10-07 DIAGNOSIS — J45.20 MILD INTERMITTENT ASTHMA WITHOUT COMPLICATION: ICD-10-CM

## 2022-10-07 DIAGNOSIS — I10 BENIGN ESSENTIAL HYPERTENSION: ICD-10-CM

## 2022-10-07 DIAGNOSIS — F51.01 PRIMARY INSOMNIA: ICD-10-CM

## 2022-10-07 DIAGNOSIS — E78.00 HIGH CHOLESTEROL: ICD-10-CM

## 2022-10-07 DIAGNOSIS — E78.5 DYSLIPIDEMIA WITH HIGH LDL AND LOW HDL: ICD-10-CM

## 2022-10-07 DIAGNOSIS — Z13.1 SCREENING FOR DIABETES MELLITUS: ICD-10-CM

## 2022-10-07 DIAGNOSIS — K21.9 GASTROESOPHAGEAL REFLUX DISEASE WITHOUT ESOPHAGITIS: ICD-10-CM

## 2022-10-07 PROBLEM — Z01.818 PREOPERATIVE EXAMINATION: Status: RESOLVED | Noted: 2021-11-29 | Resolved: 2022-10-07

## 2022-10-07 PROBLEM — M79.672 PAIN OF LEFT HEEL: Status: RESOLVED | Noted: 2018-11-28 | Resolved: 2022-10-07

## 2022-10-07 PROBLEM — Z71.89 ADVANCED DIRECTIVES, COUNSELING/DISCUSSION: Status: RESOLVED | Noted: 2020-08-04 | Resolved: 2022-10-07

## 2022-10-07 PROBLEM — N94.6 DYSMENORRHEA: Status: RESOLVED | Noted: 2018-01-26 | Resolved: 2022-10-07

## 2022-10-07 LAB
ALBUMIN SERPL-MCNC: 4.2 G/DL (ref 3.4–5)
ALP SERPL-CCNC: 78 U/L (ref 40–150)
ALT SERPL W P-5'-P-CCNC: 22 U/L (ref 0–50)
ANION GAP SERPL CALCULATED.3IONS-SCNC: 6 MMOL/L (ref 3–14)
AST SERPL W P-5'-P-CCNC: 20 U/L (ref 0–45)
BILIRUB SERPL-MCNC: 0.4 MG/DL (ref 0.2–1.3)
BUN SERPL-MCNC: 13 MG/DL (ref 7–30)
CALCIUM SERPL-MCNC: 9.6 MG/DL (ref 8.5–10.1)
CHLORIDE BLD-SCNC: 104 MMOL/L (ref 94–109)
CHOLEST SERPL-MCNC: 201 MG/DL
CO2 SERPL-SCNC: 30 MMOL/L (ref 20–32)
CREAT SERPL-MCNC: 0.92 MG/DL (ref 0.52–1.04)
FASTING STATUS PATIENT QL REPORTED: YES
GFR SERPL CREATININE-BSD FRML MDRD: 72 ML/MIN/1.73M2
GLUCOSE BLD-MCNC: 106 MG/DL (ref 70–99)
HDLC SERPL-MCNC: 58 MG/DL
LDLC SERPL CALC-MCNC: 121 MG/DL
NONHDLC SERPL-MCNC: 143 MG/DL
POTASSIUM BLD-SCNC: 4 MMOL/L (ref 3.4–5.3)
PROT SERPL-MCNC: 7.9 G/DL (ref 6.8–8.8)
SODIUM SERPL-SCNC: 140 MMOL/L (ref 133–144)
TRIGL SERPL-MCNC: 111 MG/DL

## 2022-10-07 PROCEDURE — 99214 OFFICE O/P EST MOD 30 MIN: CPT | Mod: 25 | Performed by: PHYSICIAN ASSISTANT

## 2022-10-07 PROCEDURE — 80061 LIPID PANEL: CPT | Performed by: PHYSICIAN ASSISTANT

## 2022-10-07 PROCEDURE — 80053 COMPREHEN METABOLIC PANEL: CPT | Performed by: PHYSICIAN ASSISTANT

## 2022-10-07 PROCEDURE — 36415 COLL VENOUS BLD VENIPUNCTURE: CPT | Performed by: PHYSICIAN ASSISTANT

## 2022-10-07 PROCEDURE — 99396 PREV VISIT EST AGE 40-64: CPT | Performed by: PHYSICIAN ASSISTANT

## 2022-10-07 RX ORDER — LISINOPRIL 40 MG/1
40 TABLET ORAL DAILY
Qty: 90 TABLET | Refills: 3 | Status: SHIPPED | OUTPATIENT
Start: 2022-10-07 | End: 2023-09-14

## 2022-10-07 RX ORDER — ZOLPIDEM TARTRATE 10 MG/1
10 TABLET ORAL
Qty: 30 TABLET | Refills: 5 | Status: SHIPPED | OUTPATIENT
Start: 2022-10-07 | End: 2023-06-13

## 2022-10-07 RX ORDER — AMLODIPINE BESYLATE 10 MG/1
10 TABLET ORAL DAILY
Qty: 90 TABLET | Refills: 3 | Status: SHIPPED | OUTPATIENT
Start: 2022-10-07 | End: 2023-09-14

## 2022-10-07 RX ORDER — PRAVASTATIN SODIUM 40 MG
40 TABLET ORAL DAILY
Qty: 90 TABLET | Refills: 3 | Status: SHIPPED | OUTPATIENT
Start: 2022-10-07 | End: 2023-09-14

## 2022-10-07 RX ORDER — ALBUTEROL SULFATE 90 UG/1
AEROSOL, METERED RESPIRATORY (INHALATION)
Qty: 18 G | Refills: 4 | Status: SHIPPED | OUTPATIENT
Start: 2022-10-07 | End: 2024-02-19

## 2022-10-07 ASSESSMENT — ASTHMA QUESTIONNAIRES
ACT_TOTALSCORE: 25
QUESTION_5 LAST FOUR WEEKS HOW WOULD YOU RATE YOUR ASTHMA CONTROL: COMPLETELY CONTROLLED
QUESTION_3 LAST FOUR WEEKS HOW OFTEN DID YOUR ASTHMA SYMPTOMS (WHEEZING, COUGHING, SHORTNESS OF BREATH, CHEST TIGHTNESS OR PAIN) WAKE YOU UP AT NIGHT OR EARLIER THAN USUAL IN THE MORNING: NOT AT ALL
QUESTION_2 LAST FOUR WEEKS HOW OFTEN HAVE YOU HAD SHORTNESS OF BREATH: NOT AT ALL
QUESTION_1 LAST FOUR WEEKS HOW MUCH OF THE TIME DID YOUR ASTHMA KEEP YOU FROM GETTING AS MUCH DONE AT WORK, SCHOOL OR AT HOME: NONE OF THE TIME
QUESTION_4 LAST FOUR WEEKS HOW OFTEN HAVE YOU USED YOUR RESCUE INHALER OR NEBULIZER MEDICATION (SUCH AS ALBUTEROL): NOT AT ALL
ACT_TOTALSCORE: 25

## 2022-10-07 ASSESSMENT — ENCOUNTER SYMPTOMS
PALPITATIONS: 0
HEMATURIA: 0
SHORTNESS OF BREATH: 0
JOINT SWELLING: 1
PARESTHESIAS: 0
SORE THROAT: 0
DIARRHEA: 0
CONSTIPATION: 0
HEARTBURN: 1
WEAKNESS: 0
MYALGIAS: 1
EYE PAIN: 0
NAUSEA: 0
FEVER: 0
CHILLS: 0
HEMATOCHEZIA: 0
DIZZINESS: 0
ARTHRALGIAS: 1
DYSURIA: 0
NERVOUS/ANXIOUS: 0
BREAST MASS: 0
ABDOMINAL PAIN: 0
HEADACHES: 0
COUGH: 0
FREQUENCY: 0

## 2022-10-07 NOTE — RESULT ENCOUNTER NOTE
Nia Joy,       Your recent test results are attached, if you have any questions or concerns please feel free to contact me via e-mail or call [phone].  Cholesterol  and blood sugar improving. Sodium and potassium normal. B Creatinine and GFR normal, which means kidney function is normal.   Liver tests normal.          It was a pleasure to see you at your recent office visit.      Sincerely,  Stella Santiago PA-C

## 2022-11-11 NOTE — PROGRESS NOTES
"  Assessment & Plan     Tinnitus, right  No effusion seen on tympanogram  Could be hearing loss or idiopathic, given referrals to check into this  Discussed often nothing that can be done  - TYMPANOMETRY  - Adult ENT  Referral; Future  - Adult Audiology  Referral; Future    Low back pain, unspecified back pain laterality, unspecified chronicity, unspecified whether sciatica present  offerred pt, she would like to start with specialsit first  Normal xrays 3 years ago  - Orthopedic  Referral; Future    Hip pain, right  Same as above  - Orthopedic  Referral; Future  Try topical diclofenac  Weight loss would help likely      Return in about 4 weeks (around 12/15/2022) for if not improving.    DAVID Mancia [facility]    Bobby Joy is a 57 year old accompanied by her Self, presenting for the following health issues:  Ear Problem      History of Present Illness       Reason for visit:  R hip pain and swooshing sound in R ear  Symptom onset:  More than a month  Symptoms include:  Pain in R hip and swooshing sound in R ear  Symptom intensity:  Moderate  Symptom progression:  Staying the same  Had these symptoms before:  Yes  Has tried/received treatment for these symptoms:  Yes  Previous treatment was successful:  No  What makes it worse:  Driving more than 1hour and sometimes long sitting or walking  What makes it better:  Oxycodone    She eats 2-3 servings of fruits and vegetables daily.She consumes 2 sweetened beverage(s) daily.She exercises with enough effort to increase her heart rate 9 or less minutes per day.  She exercises with enough effort to increase her heart rate 3 or less days per week. She is missing 1 dose(s) of medications per week.  She is not taking prescribed medications regularly due to other.       1)  tinnitis right ear for 6 months:  bp is normal right now and she is having the \"whooshing\" sound in her right hear. " Hasn't noticed hearing changes. She wonders if it is wax.       2)  SI/RIGHT HIP/Buttock pain. Worse with sitting or driving. Normal strength and rom. For months but worse now. Has a h/o LBP. bmi 32. Last xray of back was negative. Xray of hip was negative as well both in 2019.  Pt? Has not done.     Tympanograms are normal. Will refer for hearing test.       Review of Systems   Constitutional, HEENT, cardiovascular, pulmonary, GI, , musculoskeletal, neuro, skin, endocrine and psych systems are negative, except as otherwise noted.      Objective    /82   Pulse 76   Temp 97.6  F (36.4  C) (Tympanic)   Resp 16   Wt 94.3 kg (208 lb)   LMP 08/16/2021 (Approximate)   SpO2 100%   BMI 32.58 kg/m    There is no height or weight on file to calculate BMI.  Physical Exam   GENERAL:  No acute distress.  Interacts and answers questions appropriately.  Alert and oriented.  HEENT:   TMs normal and intact. No cerumen impaction. No tragel tenderness.   CARDIAC:   Regular rate and rhythm.  No murmurs, rubs, or gallops.   PULMONARY: Clear to auscultation bilaterally.  No  wheezes, crackles, or rhonchi.  Normal air exchange/breath sounds.   MUSCULOSKELETAL:  Low  over Right SI and gluteal region.  Not tender over lumbar vertebrae.    No erythema, ecchymosis, edema, or warmth.  Range of motion normal with lateral bending, forward flexion, and extension.  Hip, knee, and ankle strength 5/5 and equal bilaterally.  Distal sensation and pulses intact.   Straight leg raise negative.  NEUROLOGICAL:  Alert and oriented.  Gait normal.  Cranial nerves II-XII grossly intact.  Patellar reflexes 2+ and equal bilaterally.  PSYCH: Normal affect.  SKIN: No rashes.

## 2022-11-17 ENCOUNTER — OFFICE VISIT (OUTPATIENT)
Dept: FAMILY MEDICINE | Facility: CLINIC | Age: 57
End: 2022-11-17
Payer: COMMERCIAL

## 2022-11-17 VITALS
SYSTOLIC BLOOD PRESSURE: 127 MMHG | RESPIRATION RATE: 16 BRPM | TEMPERATURE: 97.6 F | BODY MASS INDEX: 32.58 KG/M2 | HEART RATE: 76 BPM | DIASTOLIC BLOOD PRESSURE: 82 MMHG | OXYGEN SATURATION: 100 % | WEIGHT: 208 LBS

## 2022-11-17 DIAGNOSIS — M54.50 LOW BACK PAIN, UNSPECIFIED BACK PAIN LATERALITY, UNSPECIFIED CHRONICITY, UNSPECIFIED WHETHER SCIATICA PRESENT: ICD-10-CM

## 2022-11-17 DIAGNOSIS — M25.551 HIP PAIN, RIGHT: ICD-10-CM

## 2022-11-17 DIAGNOSIS — H93.11 TINNITUS, RIGHT: Primary | ICD-10-CM

## 2022-11-17 PROCEDURE — 99213 OFFICE O/P EST LOW 20 MIN: CPT | Performed by: PHYSICIAN ASSISTANT

## 2022-11-17 PROCEDURE — 92567 TYMPANOMETRY: CPT | Performed by: PHYSICIAN ASSISTANT

## 2023-01-25 ENCOUNTER — OFFICE VISIT (OUTPATIENT)
Dept: OTOLARYNGOLOGY | Facility: CLINIC | Age: 58
End: 2023-01-25
Payer: COMMERCIAL

## 2023-01-25 ENCOUNTER — OFFICE VISIT (OUTPATIENT)
Dept: AUDIOLOGY | Facility: CLINIC | Age: 58
End: 2023-01-25
Payer: COMMERCIAL

## 2023-01-25 VITALS
OXYGEN SATURATION: 97 % | WEIGHT: 208 LBS | HEART RATE: 78 BPM | DIASTOLIC BLOOD PRESSURE: 79 MMHG | SYSTOLIC BLOOD PRESSURE: 131 MMHG | BODY MASS INDEX: 32.58 KG/M2 | RESPIRATION RATE: 16 BRPM

## 2023-01-25 DIAGNOSIS — H93.A1 PULSATILE TINNITUS OF RIGHT EAR: Primary | ICD-10-CM

## 2023-01-25 DIAGNOSIS — Z53.9 ERRONEOUS ENCOUNTER--DISREGARD: ICD-10-CM

## 2023-01-25 DIAGNOSIS — H93.11 TINNITUS, RIGHT: ICD-10-CM

## 2023-01-25 PROCEDURE — 99243 OFF/OP CNSLTJ NEW/EST LOW 30: CPT | Performed by: OTOLARYNGOLOGY

## 2023-01-25 ASSESSMENT — PAIN SCALES - GENERAL: PAINLEVEL: NO PAIN (0)

## 2023-01-25 NOTE — PROGRESS NOTES
I am seeing this patient in consultation for tinnitus at the request of the provider Stella Santiago    Chief Complaint - whooshing sound right     History of Present Illness - Benita Benz is a 57 year old female who presents to me today with whooshing in right ear. Happens with bending over or turning head to the left. She hears it when lying down. It has been present and noticeable for approximately 6-12 months. She hears this daily, maybe occasionally goes away. She hears it more than not. Hearing is fine. She missed her hearing test today. In 2021 she had vertigo. She went to the hospital. No vertigo since. No pain. Blood pressure is okay, she takes meds for it.     Tests personally reviewed today for this visit:   - No audiogram today  1.) CMP was normal aside from glucose of 106.   2.) CBC was normal 5/2922    Past Medical History -   Patient Active Problem List   Diagnosis     Benign essential hypertension     Mild intermittent asthma without complication     Primary insomnia     Episodic tension-type headache, not intractable     Plantar fasciitis of left foot     Low back pain, unspecified back pain laterality, unspecified chronicity, unspecified whether sciatica present     Tear of right rotator cuff, unspecified tear extent, unspecified whether traumatic     High cholesterol       Current Medications -   Current Outpatient Medications:      albuterol (PROAIR HFA/PROVENTIL HFA/VENTOLIN HFA) 108 (90 Base) MCG/ACT inhaler, INHALE 2 PUFFS BY MOUTH EVERY 6 HOURS AS NEEDED FOR SHORTNESS OF BREATH /DYSPNEA  OR  WHEEZING, Disp: 18 g, Rfl: 4     amLODIPine (NORVASC) 10 MG tablet, Take 1 tablet (10 mg) by mouth daily, Disp: 90 tablet, Rfl: 3     diclofenac (VOLTAREN) 1 % topical gel, Apply 2 grams to hands four times daily using enclosed dosing card. (Patient taking differently: as needed Apply 2 grams to hands four times daily using enclosed dosing card.), Disp: 100 g, Rfl: 1     Flaxseed, Linseed, (FLAX SEED  OIL) 1000 MG capsule, Take 1 capsule by mouth as needed , Disp: , Rfl:      lisinopril (ZESTRIL) 40 MG tablet, Take 1 tablet (40 mg) by mouth daily, Disp: 90 tablet, Rfl: 3     meclizine (ANTIVERT) 25 MG tablet, TAKE 1 TABLET BY MOUTH EVERY 6 HOURS AS NEEDED FOR DIZZINESS, Disp: 30 tablet, Rfl: 0     Multiple Vitamins-Minerals (MULTIVITAMIN ADULT PO), , Disp: , Rfl:      Omega-3 Fatty Acids (OMEGA-3 FISH OIL PO), , Disp: , Rfl:      omeprazole (PRILOSEC) 20 MG DR capsule, Take 1 capsule (20 mg) by mouth daily, Disp: 90 capsule, Rfl: 3     order for DME, Equipment being ordered: [id number] $254  Walking Boot, [employer], MED, Disp: 1 Device, Rfl: 0     pravastatin (PRAVACHOL) 40 MG tablet, Take 1 tablet (40 mg) by mouth daily, Disp: 90 tablet, Rfl: 3     triamcinolone (KENALOG) 0.1 % cream, Apply sparingly to affected area three times daily as needed for up to 14 days, Disp: 45 g, Rfl: 0     zolpidem (AMBIEN) 10 MG tablet, Take 1 tablet (10 mg) by mouth nightly as needed for sleep, Disp: 30 tablet, Rfl: 5    Allergies -   Allergies   Allergen Reactions     Simvastatin      Periferal neuropathy     Statin Drugs [Hmg-Coa-R Inhibitors] Other (See Comments)     Numbness is legs       Social History -   Social History     Socioeconomic History     Marital status:    Tobacco Use     Smoking status: Former     Packs/day: 1.50     Years: 2.00     Pack years: 3.00     Types: Cigarettes     Start date: 1997     Quit date: 1999     Years since quittin.6     Smokeless tobacco: Never   Vaping Use     Vaping Use: Never used   Substance and Sexual Activity     Alcohol use: Not Currently     Comment: rare     Drug use: Not Currently     Types: Marijuana     Sexual activity: Not Currently     Partners: Male     Birth control/protection: None   Other Topics Concern     Parent/sibling w/ CABG, MI or angioplasty before 65F 55M? No       Family History -   Family History   Problem Relation Age of Onset     Diabetes  Mother      Thyroid Disease Mother      Sarcoidosis Mother      Asthma Mother      Obesity Mother      Hypertension Father      Hyperlipidemia Father      Obesity Father      Obesity Brother      Obesity Sister      Review of Systems - As per HPI and PMHx, otherwise 7 system review of the head and neck is negative.    Physical Exam  General - The patient is in no distress. Alert and oriented to person and place, answers questions and cooperates with examination appropriately.   Neurologic - CN II-XII are grossly intact. No focal neurologic deficits.   Voice and Breathing - The patient was breathing comfortably without the use of accessory muscles. There was no wheezing, stridor, or stertor.  The patients voice was clear and strong.  Ears - auscultation revealed no audible pulsation, no bruits, no cardiac murmurs. The tympanic membranes are normal in appearance, bony landmarks are intact.  No retraction, perforation, or masses. No fluid or purulence was seen in the external canal or the middle ear. No evidence of infection of the middle ear or external canal, cerumen was normal in appearance.  Eyes - Extraocular movements intact. Sclera were not icteric or injected, conjunctiva were pink and moist.  Mouth - Examination of the oral cavity showed pink, healthy oral mucosa. No lesions or ulcerations noted.  The tongue was mobile and midline.  Throat - The walls of the oropharynx were smooth, symmetric, and had no lesions or ulcerations.  The uvula was midline on elevation.    Neck - Soft, non-tender. Palpation of the occipital, submental, submandibular, internal jugular chain, and supraclavicular nodes did not demonstrate any abnormal lymph nodes or masses. No parotid masses. Palpation of the thyroid was soft and smooth, with no nodules or goiter appreciated.  The trachea was mobile and midline.      Assessment and Plan - Benita Benz is a 57 year old female who presents to me today with pulsatile right tinnitus for  over a year. I recommend CTA head and neck given the length of time that has passed. She missed her audiogram. I'll have her come back for that.     Chay Rose MD  Otolaryngology  [facility]

## 2023-01-25 NOTE — LETTER
1/25/2023         RE: Benita Benz  [id number] [facility] [id number]        Dear Colleague,    Thank you for referring your patient, Benita Benz, to the [facility]. Please see a copy of my visit note below.    I am seeing this patient in consultation for tinnitus at the request of the provider Stella Santiago    Chief Complaint - whooshing sound right     History of Present Illness - Benita Benz is a 57 year old female who presents to me today with whooshing in right ear. Happens with bending over or turning head to the left. She hears it when lying down. It has been present and noticeable for approximately 6-12 months. She hears this daily, maybe occasionally goes away. She hears it more than not. Hearing is fine. She missed her hearing test today. In 2021 she had vertigo. She went to the hospital. No vertigo since. No pain. Blood pressure is okay, she takes meds for it.     Tests personally reviewed today for this visit:   - No audiogram today  1.) CMP was normal aside from glucose of 106.   2.) CBC was normal 5/2922    Past Medical History -   Patient Active Problem List   Diagnosis     Benign essential hypertension     Mild intermittent asthma without complication     Primary insomnia     Episodic tension-type headache, not intractable     Plantar fasciitis of left foot     Low back pain, unspecified back pain laterality, unspecified chronicity, unspecified whether sciatica present     Tear of right rotator cuff, unspecified tear extent, unspecified whether traumatic     High cholesterol       Current Medications -   Current Outpatient Medications:      albuterol (PROAIR HFA/PROVENTIL HFA/VENTOLIN HFA) 108 (90 Base) MCG/ACT inhaler, INHALE 2 PUFFS BY MOUTH EVERY 6 HOURS AS NEEDED FOR SHORTNESS OF BREATH /DYSPNEA  OR  WHEEZING, Disp: 18 g, Rfl: 4     amLODIPine (NORVASC) 10 MG tablet, Take 1 tablet (10 mg) by mouth daily, Disp: 90 tablet, Rfl: 3     diclofenac (VOLTAREN) 1  % topical gel, Apply 2 grams to hands four times daily using enclosed dosing card. (Patient taking differently: as needed Apply 2 grams to hands four times daily using enclosed dosing card.), Disp: 100 g, Rfl: 1     Flaxseed, Linseed, (FLAX SEED OIL) 1000 MG capsule, Take 1 capsule by mouth as needed , Disp: , Rfl:      lisinopril (ZESTRIL) 40 MG tablet, Take 1 tablet (40 mg) by mouth daily, Disp: 90 tablet, Rfl: 3     meclizine (ANTIVERT) 25 MG tablet, TAKE 1 TABLET BY MOUTH EVERY 6 HOURS AS NEEDED FOR DIZZINESS, Disp: 30 tablet, Rfl: 0     Multiple Vitamins-Minerals (MULTIVITAMIN ADULT PO), , Disp: , Rfl:      Omega-3 Fatty Acids (OMEGA-3 FISH OIL PO), , Disp: , Rfl:      omeprazole (PRILOSEC) 20 MG DR capsule, Take 1 capsule (20 mg) by mouth daily, Disp: 90 capsule, Rfl: 3     order for DME, Equipment being ordered: [address]$254  Walking Boot, [employer], MED, Disp: 1 Device, Rfl: 0     pravastatin (PRAVACHOL) 40 MG tablet, Take 1 tablet (40 mg) by mouth daily, Disp: 90 tablet, Rfl: 3     triamcinolone (KENALOG) 0.1 % cream, Apply sparingly to affected area three times daily as needed for up to 14 days, Disp: 45 g, Rfl: 0     zolpidem (AMBIEN) 10 MG tablet, Take 1 tablet (10 mg) by mouth nightly as needed for sleep, Disp: 30 tablet, Rfl: 5    Allergies -   Allergies   Allergen Reactions     Simvastatin      Periferal neuropathy     Statin Drugs [Hmg-Coa-R Inhibitors] Other (See Comments)     Numbness is legs       Social History -   Social History     Socioeconomic History     Marital status:    Tobacco Use     Smoking status: Former     Packs/day: 1.50     Years: 2.00     Pack years: 3.00     Types: Cigarettes     Start date: 1997     Quit date: 1999     Years since quittin.6     Smokeless tobacco: Never   Vaping Use     Vaping Use: Never used   Substance and Sexual Activity     Alcohol use: Not Currently     Comment: rare     Drug use: Not Currently     Types: Marijuana     Sexual  activity: Not Currently     Partners: Male     Birth control/protection: None   Other Topics Concern     Parent/sibling w/ CABG, MI or angioplasty before 65F 55M? No       Family History -   Family History   Problem Relation Age of Onset     Diabetes Mother      Thyroid Disease Mother      Sarcoidosis Mother      Asthma Mother      Obesity Mother      Hypertension Father      Hyperlipidemia Father      Obesity Father      Obesity Brother      Obesity Sister      Review of Systems - As per HPI and PMHx, otherwise 7 system review of the head and neck is negative.    Physical Exam  General - The patient is in no distress. Alert and oriented to person and place, answers questions and cooperates with examination appropriately.   Neurologic - CN II-XII are grossly intact. No focal neurologic deficits.   Voice and Breathing - The patient was breathing comfortably without the use of accessory muscles. There was no wheezing, stridor, or stertor.  The patients voice was clear and strong.  Ears - auscultation revealed no audible pulsation, no bruits, no cardiac murmurs. The tympanic membranes are normal in appearance, bony landmarks are intact.  No retraction, perforation, or masses. No fluid or purulence was seen in the external canal or the middle ear. No evidence of infection of the middle ear or external canal, cerumen was normal in appearance.  Eyes - Extraocular movements intact. Sclera were not icteric or injected, conjunctiva were pink and moist.  Mouth - Examination of the oral cavity showed pink, healthy oral mucosa. No lesions or ulcerations noted.  The tongue was mobile and midline.  Throat - The walls of the oropharynx were smooth, symmetric, and had no lesions or ulcerations.  The uvula was midline on elevation.    Neck - Soft, non-tender. Palpation of the occipital, submental, submandibular, internal jugular chain, and supraclavicular nodes did not demonstrate any abnormal lymph nodes or masses. No parotid  masses. Palpation of the thyroid was soft and smooth, with no nodules or goiter appreciated.  The trachea was mobile and midline.      Assessment and Plan - Benita Benz is a 57 year old female who presents to me today with pulsatile right tinnitus for over a year. I recommend CTA head and neck given the length of time that has passed. She missed her audiogram. I'll have her come back for that.     Chay Rose MD  Otolaryngology  [facility]          Again, thank you for allowing me to participate in the care of your patient.        Sincerely,        Chay Rose MD

## 2023-01-27 ENCOUNTER — ANCILLARY PROCEDURE (OUTPATIENT)
Dept: CT IMAGING | Facility: CLINIC | Age: 58
End: 2023-01-27
Attending: OTOLARYNGOLOGY
Payer: COMMERCIAL

## 2023-01-27 DIAGNOSIS — H93.A1 PULSATILE TINNITUS OF RIGHT EAR: ICD-10-CM

## 2023-01-27 DIAGNOSIS — H93.11 TINNITUS, RIGHT: ICD-10-CM

## 2023-01-27 PROCEDURE — 70496 CT ANGIOGRAPHY HEAD: CPT | Mod: TC | Performed by: RADIOLOGY

## 2023-01-27 PROCEDURE — 70498 CT ANGIOGRAPHY NECK: CPT | Mod: TC | Performed by: RADIOLOGY

## 2023-01-27 RX ORDER — IOPAMIDOL 755 MG/ML
500 INJECTION, SOLUTION INTRAVASCULAR ONCE
Status: COMPLETED | OUTPATIENT
Start: 2023-01-27 | End: 2023-01-27

## 2023-01-27 RX ADMIN — IOPAMIDOL 75 ML: 755 INJECTION, SOLUTION INTRAVASCULAR at 08:40

## 2023-01-27 RX ADMIN — Medication 100 ML: at 08:40

## 2023-03-02 ENCOUNTER — TELEPHONE (OUTPATIENT)
Dept: FAMILY MEDICINE | Facility: CLINIC | Age: 58
End: 2023-03-02

## 2023-03-02 NOTE — TELEPHONE ENCOUNTER
"Patient on my list for chest pain today. Also is in a double booked slot so one will need to be removed. If Benita needs the ER which sounds like she might from the notes she wrote, just take her off. Otherwise please take off whoever scheduled second.  The new patient (the one that is also double booked) could go to urgent care as well, it says \"not feeling well\".    Either way, please triage Benita first then decide who should be taken off the double booking.     Thank you very much!  Stella  "

## 2023-03-02 NOTE — TELEPHONE ENCOUNTER
Patient shoveling snow, felt some discomfort in chest, has not gone away  Discomfort in left side of chest and radiates to left shoulder, tightness in chest  Can feel discomfort slightly this morning, worse when lifting things, works at [employer] so when lift boxes, is worse  Does have history of high blood pressure and high cholesterol  Gets more tired and winded upon exertion      Instructed patient needs to go to the ED now for further evaluation of her heart  Patient verbalized understanding and agreed to go to the ER      Morenita PRESCOTT, RN

## 2023-03-16 NOTE — PROGRESS NOTES
Assessment & Plan     Chest pain, unspecified type  See hpi  If negative, suspect m/s from shoveling  To er if symptoms worsen again  - Echocardiogram Dobutamine Stress; Future    Benign essential hypertension    - chlorthalidone (HYGROTON) 25 MG tablet; Take 1 tablet (25 mg) by mouth daily as needed (headache/high blood pressure)  - Adult Dermatology Referral; Future        [facility]    Subjective   Benita is a 57 year old accompanied by her self, presenting for the following health issues:  ER F/U      HPI       Copied from er:  Since visit to er has been feeling better. Has had this off and on. After shoveling. Felt left chest having pressure so she went to er.       On sunday had headache and higher bp. Took her  hydralazine which helped.   Would like something else that she can take only if needed. doesnt get high bp very often. Was sick when this happened/diarrhea.     Was told to get OP stress test which we will get. No new symptoms.  bmi 31.   No edema, pnd or othopnea.   ED/UC Followup:    Facility:  [facility]  Date of visit: 03/02/2023  Reason for visit: Chest pain.  Current Status: Per pt feels better with SX now  ED Provider Note - Jason Marc DO - 03/02/2023 10:28 AM CST  Formatting of this note is different from the original.  Chief Complaint:  Chest Pain    History of Present Illness:  Benita Benz is a 57 y.o. female with history of hypertension, hyperlipidemia, and asthma who presents to the ED via private car for evaluation of chest pain. The patient reports intermittent left-sided chest pain and shortness of breath since shoveling on 2/22/23. She works at [employer] and states that moving boxes at her job exacerbated her symptoms. Today, her chest pain was accompanied by left arm heaviness which concerned her. She attempted to make an appointment with her primary care provider but she was told to present to the ED instead.  "She denies recent cardiac stress test. She has inhalers at home but states that she does not use them frequently.     Independent Historian:   None - Patient Only    Allergies:  Simvastatin  Statins-Hmg-Coa Reductase Inhibitors     Medications:  Albuterol HFA (Pro-air; Ventolin; Proventil) 90 Mcg/actuation Inhaler  Amlodipine (Norvasc) 10 Mg Tablet  Docusate (Colace) 100 Mg Capsule  Hydrocortisone (Anusol-hcsuppository) 25 Mg Suppository  Hydroxyzine Pamoate (Vistaril) 25 Mg Capsule  Lisinopril (Prinivil; Zestril) 40 Mg Tablet  Meclizine (Antivert) 25 Mg Tablet  Meloxicam (Mobic) 7.5 Mg Tablet  Omeprazole (Prilosec) 20 Mg Delayed-release Capsule  Ondansetron (Zofran ODT) 4 Mg Disintegrating Tablet  Pravastatin (Pravachol) 40 Mg Tablet  Triamcinolone Acetonide (Kenalog) 10 Mg/ml Injection  Zolpidem (Ambien) 10 Mg Tablet    Medical History:  Benign Essential Hypertension   High Cholesterol   Low Back Pain, Back Pain Laterality, Chronicity, Whether Sciatica Present   Mild Intermittent Asthma Without Complication   Occipital Neuralgia   Plantar Fasciitis Of Left Foot   Primary Insomnia   Tear Of Right Rotator Cuff, Tear Extent, Whether     Surgical History:  Eye Surgery    Family History:  CHF - Uncle  Heart Disease - Mother     Social History:  The patient is a former smoker.     Vitals:  Patient Vitals for the past 24 hrs:  BP Temp Pulse Resp SpO2 Height Weight   03/02/23 1024 169/78 98.5  F (36.9  C) 66 18 97 % 1.702 m (5' 7\") 94.3 kg (208 lb)     Physical Exam  General: No acute distress. Conversant.  HENT: Normocephalic. Atraumatic. Moist mucous membranes.   Eyes: Non injected conjuctiva. No scleral icterus.  Cardiovascular: Heart normal rate and rhythm. No harsh murmurs. Symmetric radial pulses.  Respiratory: Normal effort. Clear to auscultation bilaterally.  Gastrointestinal: Soft. No tenderness in all 4 quadrants. No guarding rebound or rigidity.   Neuro: Awake and alert. Normal speech. Strength grossly " intact.  Musculoskeletal: No lower extremity edema. No gross deformities.  Skin: No rashes. No pallor. No diaphoresis.  Psych: Normal mood and affect.     ECG:  EKG Results for the Hospital Encounter of 03/02/23   EKG 12 LEAD   Collection Time: 03/02/23 10:39 AM   Result Value   Interpretation   Sinus bradycardia  Low voltage QRS  Borderline ECG  No previous ECGs available    Ventricular Rate 58   P-R Interval 182   QRS Duration 86      QTc 424   R Axis 46     Laboratory:  Labs Reviewed   BASIC METABOLIC PANEL - Abnormal; Notable for the following components:   Result Value   eGFR 75 (*)   All other components within normal limits   CBC WITH AUTO DIFFERENTIAL - Abnormal; Notable for the following components:   ABSOLUTE LYMPHOCYTES 3.7 (*)   All other components within normal limits   TROPONIN I - Normal   CBC AND DIFFERENTIAL   Narrative:   The following orders were created for panel order CBC AND DIFFERENTIAL.  Procedure Abnormality Status   --------- ----------- ------   CBC WITH AUTO DIFFERENTIAL[[id number]] Abnormal Final result     Please view results for these tests on the individual orders.   D-DIMER,QUANTITATIVE   Narrative:   The cut off value for exclusion of Deep Vein Thrombosis and / or Pulmonary  Embolism is 0.50 FEU mcg/mL   For patients greater than 50 years of age the upper limit is age dependent and was calculated with the formula:     (PATIENT AGE x 0.01) FEU mcg/mL = Upper limit of normal range      Imaging:  XR CHEST 2 VIEWS PA AND LATERAL    Result Date: 3/2/2023  For Patients: As a result of the 21st Century Cures Act, medical imaging exams and procedure reports are released immediately into your electronic medical record. You may view this report before your referring provider. If you have questions, please contact your health care provider. EXAM: XR CHEST 2 VIEWS PA AND LATERAL LOCATION: [facility] DATE/TIME: 3/2/2023 11:24 AM INDICATION: Chest pain. COMPARISON: None.     Heart size  and pulmonary vascularity within normal limits. No focal pulmonary infiltrates. Hypertrophic changes thoracic spine with anterior wedging of a midthoracic vertebral body. Aortic calcification.      ED Course / Assessments:    Interventions:  All Medication Administration through 03/02/2023 1632   Date/Time Order Dose Route Action   03/02/2023 1030 CST aspirin 325 mg tablet 325 mg Oral Given         Independent Interpretation (X-rays, CTs, rhythm strip):   -Independent chest x-ray reviewed without pneumothorax effusions or pneumonias    Disposition:  The patient was discharged to home.    Impression and Plan:  Benita Benz is a 57 y.o. female with remote history of asthma, presenting with several days of atypical chest discomfort. Her EKG is nonischemic and her troponin is not significantly elevated. With several days of symptoms I feel this rules her out for ACS. She was given a full dose aspirin here. Her D-dimer is undetectable. Very low concern for PE. She does not need CT pulmonary angiogram. X-ray without fluid overload pneumothorax or effusions. No infectious history no concerns for pneumonia. She likely has musculoskeletal etiology. I did tell her that if she has chest pain or breathing problems while working she needs to discuss stress testing and further work-up with her regular provider. I do not feel she requires regular aspirin nitroglycerin or cardiology consult today. Her symptoms are described as quite atypical. If she has progressive severe chest pain or breathing problems she can come back to the ER for another evaluation. Her mediastinum is not widened, she is neurovascularly intact I have low concerns for aortic pathology. No signs of carditis on her EKG. Her vitals are grossly normal and she is ambulatory upon discharge.    Diagnosis:  ENCOUNTER DIAGNOSES   ICD-10-CM   1. Chest pain, unspecified type R07.9        Review of Systems   Constitutional, HEENT, cardiovascular, pulmonary, GI, ,  "musculoskeletal, neuro, skin, endocrine and psych systems are negative, except as otherwise noted.      Objective    /82   Pulse 71   Temp 97.5  F (36.4  C) (Tympanic)   Resp 16   Ht 1.702 m (5' 7\")   Wt 92.5 kg (204 lb)   LMP 08/16/2021 (Approximate)   SpO2 98%   Breastfeeding No   BMI 31.95 kg/m    Body mass index is 31.95 kg/m .  Physical Exam   GENERAL: alert, no distress and obese  RESP: lungs clear to auscultation - no rales, rhonchi or wheezes  CV: regular rate and rhythm, normal S1 S2, no S3 or S4, no murmur, click or rub, no peripheral edema and peripheral pulses strong  MS: no gross musculoskeletal defects noted, no edema  SKIN: no suspicious lesions or rashes  NEURO: Normal strength and tone, mentation intact and speech normal  PSYCH: mentation appears normal, affect normal/bright                  "

## 2023-03-23 ENCOUNTER — OFFICE VISIT (OUTPATIENT)
Dept: FAMILY MEDICINE | Facility: CLINIC | Age: 58
End: 2023-03-23
Payer: COMMERCIAL

## 2023-03-23 VITALS
RESPIRATION RATE: 16 BRPM | WEIGHT: 204 LBS | HEART RATE: 71 BPM | HEIGHT: 67 IN | DIASTOLIC BLOOD PRESSURE: 82 MMHG | OXYGEN SATURATION: 98 % | BODY MASS INDEX: 32.02 KG/M2 | TEMPERATURE: 97.5 F | SYSTOLIC BLOOD PRESSURE: 134 MMHG

## 2023-03-23 DIAGNOSIS — R07.9 CHEST PAIN, UNSPECIFIED TYPE: Primary | ICD-10-CM

## 2023-03-23 DIAGNOSIS — I10 BENIGN ESSENTIAL HYPERTENSION: ICD-10-CM

## 2023-03-23 PROCEDURE — 99213 OFFICE O/P EST LOW 20 MIN: CPT | Performed by: PHYSICIAN ASSISTANT

## 2023-03-23 RX ORDER — CHLORTHALIDONE 25 MG/1
25 TABLET ORAL DAILY PRN
Qty: 30 TABLET | Refills: 2 | Status: SHIPPED | OUTPATIENT
Start: 2023-03-23

## 2023-03-23 ASSESSMENT — ASTHMA QUESTIONNAIRES: ACT_TOTALSCORE: 25

## 2023-03-23 ASSESSMENT — PAIN SCALES - GENERAL: PAINLEVEL: NO PAIN (0)

## 2023-04-28 NOTE — PATIENT INSTRUCTIONS
PATIENT INFORMATION    Anticipatory guidance discussed  • Use correct words to speak to your child. Avoid 'baby talk'.   • Read together every day.  • Encourage language development by reading and singing to your child. Talk about what you see  • Use words to describe feelings and emotions. Encourage self expression.  • Encourage your child to play with other children. Enroll in playgroups, take to the park  • Create family time, routines, rituals. Take care of yourself  • Support child's emerging independence but reinforce limits and appropriate behavior.   • Give child reasonable choices between two options.  • Be consistent with discipline and enforcing limits. Make sure other caregivers are consistent as well.  • Time outs are ok; one minute for each year of age.  • Anticipate anxiety/clinging behavior in new situations  • Praise good behavior and accomplishments  • Enjoy daily playtime with your child.  • Limit screen time to no more than 1 hour per day total including tablet, phone, TV, computer, video games. Monitor what your child is watching  • Encourage physical activity; be active with your child  • Signs of potty training readiness include staying dry for more than 2 hours, knows when he/she is wet or dry, asks to be changed, can pull pants up/or down, hides to poop  • Read books about the potty, praise attempts to sit on the potty  • Plan for frequent potty breaks; a timer set to every 1-2 hours can be helpful.  • Maintain consistent bedtime routine; read a book, brush teeth, cuddle. Put child to bed when still awake but drowsy.  • If nighttime waking occurs, reassure briefly, give comfort object (blanket, stuffed animal)  • Do not give bottle in bed. If still using bottle, water only  • Brush teeth twice daily using smear of flouride containing toothpaste.   • Teach child to wash hands before meals and after the potty  • Offer water. Juice is not necessary and should be diluted if given. No soda or  Thanks for coming today.  [facility]  [address] [place] [address]    To schedule future appointments in [facility] Clinic, you may call [phone].    To schedule ordered imaging by your provider:   Call [facility] [phone]    To schedule an injection ordered by your provider:  Call [facility] Injection scheduling line: [phone]  [email or web address] available online at:  [email or web address]    Please call if any further questions or concerns ([phone]).  Clinic hours 8 am to 5 pm.    Return to clinic (call) if symptoms worsen or fail to improve.     gatorade or sweetened drinks.  • Provide 3 meals and 2-3 nutritious snacks per day.  • Trust child to decide how much food to eat. Toddlers are erratic with food intake; some days will eat very little and other days will eat a lot.  • Childproof your home: install stair garza; cleaning products and medications up high out of reach; cabinet latches; toilet lid latch; outlet protectors; window guards; avoid long cords on window shades  • Use car safety seat in the back seat. Do not put the child in the front seat.  • Use bicycle helmets  • Stay within 'arm's reach' when near water. Empty bathtubs, buckets, pools immediately after use.  • Guns should be locked up and unloaded.      Follow-Up  - Return for your 2 1/2 year (30 months) well child visit.    2 years old Health and Safety Tips - The following hyperlinks are available to access via [employer]    Parent Education from Healthy Parent    Educación para padres sobre niños sanos      Common dosing for acetaminophen and ibuprofen:   Acetaminophen (Tylenol) can be given every 4 hours.  · Infant or Children's Elixir: 160mg/5ml for  Weight 18-23 lbs 24-35 lbs 36-47 lbs   Dose 3.75 ml 5 ml 7.5 ml      Weight 48-59 lbs 60-71 lsb 72-95 lbs   Dose 10 ml 12.5 ml 15 ml     Ibuprofen (Motrin) can be given every 6 hours.  Safe for children 6 months and older.  · Infant Drops: 50mg/1.25ml  Weight 12-17 lbs 18-23 lbs   Dose 1.25 ml 1.875 ml     · Children's Elixir 100mg/5ml   Weight 12-17 lbs 18-23 lbs 24-35 lbs 36-47 lbs   Dose 2.5 ml 3.75 ml 5 ml 7.5 ml        Weight 48-59 lbs 60-71 lbs 72-95 lbs   Dose 10 ml 12.5 ml 15 ml     Additional Educational Resources:  For additional resources regarding your symptoms, diagnosis, or further health information, please visit the Discover a Healthier You section on [email or web address] or the Online Health Resources section in St[employer].

## 2023-05-15 ENCOUNTER — ALLIED HEALTH/NURSE VISIT (OUTPATIENT)
Dept: FAMILY MEDICINE | Facility: CLINIC | Age: 58
End: 2023-05-15
Payer: COMMERCIAL

## 2023-05-15 DIAGNOSIS — Z11.1 ENCOUNTER FOR TUBERCULIN SKIN TEST: Primary | ICD-10-CM

## 2023-05-15 PROCEDURE — 86580 TB INTRADERMAL TEST: CPT

## 2023-05-15 PROCEDURE — 99207 PR NO CHARGE NURSE ONLY: CPT

## 2023-05-15 NOTE — PROGRESS NOTES
"Patient is here today for a Mantoux (TST) test placement.    Is there a current order in the chart? No. Placed order according to standing order (reference the \"Skin Test- Tuberculosis Screening- Ambulatory Care\" standing order in Policy Tech). Review the Inclusion and Exclusion Criteria.          Inclusion Criteria  Pre-employment screening for healthcare workers and correctional facility staff - Administer two-step TST. Patient to return for second test in 1-3 weeks after first test is read.     Exclusion Criteria  None - Place order for Mantoux (TST) test per standing order.    Reason for Mantoux (TST) in patient's own words: work     Patient needs form signed? No - form not needed per patient.    Instructed patient to wait for 15 minutes post injection and to report any reactions immediately to staff.    Told patient to return to clinic in 48-72 hours to have Mantoux (TST) read.       BERKLEY Newman    "

## 2023-05-17 ENCOUNTER — ALLIED HEALTH/NURSE VISIT (OUTPATIENT)
Dept: NURSING | Facility: CLINIC | Age: 58
End: 2023-05-17
Payer: COMMERCIAL

## 2023-05-17 DIAGNOSIS — Z11.1 ENCOUNTER FOR SCREENING FOR RESPIRATORY TUBERCULOSIS: Primary | ICD-10-CM

## 2023-05-17 LAB
PPDINDURATION: 0 MM (ref 0–4.99)
PPDREDNESS: PRESENT

## 2023-05-17 PROCEDURE — 99207 PR NO CHARGE NURSE ONLY: CPT

## 2023-05-17 NOTE — PROGRESS NOTES
Patient is here today for a Mantoux (TST) test results.    Did patient return to clinic 48-72 hours from Mantoux (TST) placement:   Yes -     PPD Induration   Date Value Ref Range Status   05/17/2023 0 0 - 4.99 mm Final     PPD Redness   Date Value Ref Range Status   05/17/2023 Present  Final           Induration Size? Induration <5mm - Enter results in Enter/Edit Activity. Route results to ordering provider.     Patient needs form signed? Yes. Follow clinic form process.     Patient reports having previously had the BCG Vaccine: No    Does patient need a two step? No     Stacey De Guzman RN

## 2023-06-12 DIAGNOSIS — F51.01 PRIMARY INSOMNIA: ICD-10-CM

## 2023-06-13 RX ORDER — ZOLPIDEM TARTRATE 10 MG/1
10 TABLET ORAL
Qty: 30 TABLET | Refills: 0 | Status: SHIPPED | OUTPATIENT
Start: 2023-06-13

## 2023-07-25 ENCOUNTER — ALLIED HEALTH/NURSE VISIT (OUTPATIENT)
Dept: FAMILY MEDICINE | Facility: CLINIC | Age: 58
End: 2023-07-25
Payer: COMMERCIAL

## 2023-07-25 ENCOUNTER — TELEPHONE (OUTPATIENT)
Dept: FAMILY MEDICINE | Facility: CLINIC | Age: 58
End: 2023-07-25

## 2023-07-25 DIAGNOSIS — Z11.1 TUBERCULOSIS SCREENING: Primary | ICD-10-CM

## 2023-07-25 PROCEDURE — 99207 PR NO CHARGE NURSE ONLY: CPT

## 2023-07-25 PROCEDURE — 36415 COLL VENOUS BLD VENIPUNCTURE: CPT

## 2023-07-25 PROCEDURE — 86481 TB AG RESPONSE T-CELL SUSP: CPT

## 2023-07-25 NOTE — PROGRESS NOTES
Patients first TB was on 5/15/2023. Returning today to have second one done. Per policy if more than 4 weeks has passed, results will be invalid.  Spoke with Kristen Kehr regarding this and she put in orders for TB gold to be drawn.    Spoke with patient when she arrived and advised her of this and she agreed with this plan. She proceeded to the lab to have blood work done.     Chuyita Crooks MA on 7/25/2023 at 12:27 PM

## 2023-07-27 LAB
GAMMA INTERFERON BACKGROUND BLD IA-ACNC: 0.11 IU/ML
M TB IFN-G BLD-IMP: NEGATIVE
M TB IFN-G CD4+ BCKGRND COR BLD-ACNC: 9.89 IU/ML
MITOGEN IGNF BCKGRD COR BLD-ACNC: 0.18 IU/ML
MITOGEN IGNF BCKGRD COR BLD-ACNC: 0.25 IU/ML
QUANTIFERON MITOGEN: 10 IU/ML
QUANTIFERON NIL TUBE: 0.11 IU/ML
QUANTIFERON TB1 TUBE: 0.29 IU/ML
QUANTIFERON TB2 TUBE: 0.36

## 2023-07-30 NOTE — TELEPHONE ENCOUNTER
Done. Stella Santiago PA-C    
Forms brought to the  for . Left message for patient(with a man) that forms are ready to be picked up.Sophie Martino MA/TC    
Forms placed in your basket to complete.Sophie Martino MA/TC    
Reason for Call:  Form, our goal is to have forms completed with 72 hours, however, some forms may require a visit or additional information.    Type of letter, form or note:  FMLA    Who is the form from?: Patient    Where did the form come from: Patient or family brought in       What clinic location was the form placed at?: [facility]    Where the form was placed: 's Box    What number is listed as a contact on the form?: [phone]       Additional comments: patient will  when form is completed. Thank you.     Call taken on 1/27/2017 at 4:34 PM by Ally Reed      
No

## 2023-08-16 ENCOUNTER — OFFICE VISIT (OUTPATIENT)
Dept: DERMATOLOGY | Facility: CLINIC | Age: 58
End: 2023-08-16
Payer: COMMERCIAL

## 2023-08-16 DIAGNOSIS — L82.1 SEBORRHEIC KERATOSIS: Primary | ICD-10-CM

## 2023-08-16 DIAGNOSIS — D23.9 DERMATOFIBROMA: ICD-10-CM

## 2023-08-16 PROCEDURE — 99203 OFFICE O/P NEW LOW 30 MIN: CPT | Performed by: PHYSICIAN ASSISTANT

## 2023-08-16 NOTE — PROGRESS NOTES
Benita Benz is an extremely pleasant 58 year old year old female patient here today for spot on back and knee. Present for years. No pain or bleeding. Patient has no other skin complaints today.  Remainder of the HPI, Meds, PMH, Allergies, FH, and SH was reviewed in chart.    Pertinent Hx:  No personal history of skin cancer  Past Medical History:   Diagnosis Date    Arthritis     High cholesterol     Hypertension     Uncomplicated asthma        Past Surgical History:   Procedure Laterality Date    BIOPSY      COLONOSCOPY      EYE SURGERY  2014    toe surgery    ORTHOPEDIC SURGERY          Family History   Problem Relation Age of Onset    Diabetes Mother     Thyroid Disease Mother     Sarcoidosis Mother     Asthma Mother     Obesity Mother     Hypertension Father     Hyperlipidemia Father     Obesity Father     Obesity Brother     Obesity Sister        Social History     Socioeconomic History    Marital status:      Spouse name: Not on file    Number of children: Not on file    Years of education: Not on file    Highest education level: Not on file   Occupational History    Not on file   Tobacco Use    Smoking status: Former     Packs/day: 1.50     Years: 2.00     Pack years: 3.00     Types: Cigarettes     Start date: 1997     Quit date: 1999     Years since quittin.1    Smokeless tobacco: Never   Vaping Use    Vaping Use: Never used   Substance and Sexual Activity    Alcohol use: Not Currently     Comment: rare    Drug use: Not Currently     Types: Marijuana    Sexual activity: Not Currently     Partners: Male     Birth control/protection: None   Other Topics Concern    Parent/sibling w/ CABG, MI or angioplasty before 65F 55M? No   Social History Narrative    Not on file     Social Determinants of Health     Financial Resource Strain: Not on file   Food Insecurity: Not on file   Transportation Needs: Not on file   Physical Activity: Not on file   Stress: Not on file   Social Connections:  Not on file   Intimate Partner Violence: Not on file   Housing Stability: Not on file       Outpatient Encounter Medications as of 8/16/2023   Medication Sig Dispense Refill    albuterol (PROAIR HFA/PROVENTIL HFA/VENTOLIN HFA) 108 (90 Base) MCG/ACT inhaler INHALE 2 PUFFS BY MOUTH EVERY 6 HOURS AS NEEDED FOR SHORTNESS OF BREATH /DYSPNEA  OR  WHEEZING 18 g 4    amLODIPine (NORVASC) 10 MG tablet Take 1 tablet (10 mg) by mouth daily 90 tablet 3    chlorthalidone (HYGROTON) 25 MG tablet Take 1 tablet (25 mg) by mouth daily as needed (headache/high blood pressure) 30 tablet 2    diclofenac (VOLTAREN) 1 % topical gel Apply 2 grams to hands four times daily using enclosed dosing card. (Patient taking differently: as needed Apply 2 grams to hands four times daily using enclosed dosing card.) 100 g 1    Flaxseed, Linseed, (FLAX SEED OIL) 1000 MG capsule Take 1 capsule by mouth as needed       lisinopril (ZESTRIL) 40 MG tablet Take 1 tablet (40 mg) by mouth daily 90 tablet 3    meclizine (ANTIVERT) 25 MG tablet TAKE 1 TABLET BY MOUTH EVERY 6 HOURS AS NEEDED FOR DIZZINESS 30 tablet 0    Multiple Vitamins-Minerals (MULTIVITAMIN ADULT PO)       Omega-3 Fatty Acids (OMEGA-3 FISH OIL PO)       omeprazole (PRILOSEC) 20 MG DR capsule Take 1 capsule (20 mg) by mouth daily 90 capsule 3    order for DME Equipment being ordered: [id number]   Walking Boot, [employer], MED 1 Device 0    pravastatin (PRAVACHOL) 40 MG tablet Take 1 tablet (40 mg) by mouth daily 90 tablet 3    triamcinolone (KENALOG) 0.1 % cream Apply sparingly to affected area three times daily as needed for up to 14 days 45 g 0    zolpidem (AMBIEN) 10 MG tablet Take 1 tablet (10 mg) by mouth nightly as needed for sleep Needs to establish new primary care provider for further refills 30 tablet 0     No facility-administered encounter medications on file as of 8/16/2023.             O:   NAD, WDWN, Alert & Oriented, Mood & Affect wnl, Vitals stable   Here today  alone   LMP 08/16/2021 (Approximate)    General appearance normal   Vitals stable   Alert, oriented and in no acute distress      Brown stuck on papules on back and lower chest   Brown firm papule with positive dimple sign on left medial knee      Eyes: Conjunctivae/lids:Normal     ENT: Lips: normal    MSK:Normal    Pulm: Breathing Normal    Neuro/Psych: Orientation:Alert and Orientedx3 ; Mood/Affect:normal   A/P:  1. Seborrheic keratosis, dermatofibroma on left knee   It was a pleasure speaking to Benita Benz today.  BENIGN LESIONS DISCUSSED WITH PATIENT:  I discussed the specifics of tumor, prognosis, and genetics of benign lesions.  I explained that treatment of these lesions would be purely cosmetic and not medically neccessary.  I discussed with patient different removal options including excision, cautery and /or laser.      Nature and genetics of benign skin lesions dicussed with patient.  Signs and Symptoms of skin cancer discussed with patient.  ABCDEs of melanoma reviewed with patient.  Patient encouraged to perform monthly skin exams.  UV precautions reviewed with patient.  Risks of non-melanoma skin cancer discussed with patient   Return to clinic in one year or sooner if needed.

## 2023-08-16 NOTE — LETTER
2023         RE: Benita Benz  [address] [id number]        Dear Colleague,    Thank you for referring your patient, Benita Benz, to the [facility]. Please see a copy of my visit note below.    Benita Benz is an extremely pleasant 58 year old year old female patient here today for spot on back and knee. Present for years. No pain or bleeding. Patient has no other skin complaints today.  Remainder of the HPI, Meds, PMH, Allergies, FH, and SH was reviewed in chart.    Pertinent Hx:  No personal history of skin cancer  Past Medical History:   Diagnosis Date     Arthritis      High cholesterol      Hypertension      Uncomplicated asthma        Past Surgical History:   Procedure Laterality Date     BIOPSY       COLONOSCOPY       EYE SURGERY  2014    toe surgery     ORTHOPEDIC SURGERY          Family History   Problem Relation Age of Onset     Diabetes Mother      Thyroid Disease Mother      Sarcoidosis Mother      Asthma Mother      Obesity Mother      Hypertension Father      Hyperlipidemia Father      Obesity Father      Obesity Brother      Obesity Sister        Social History     Socioeconomic History     Marital status:      Spouse name: Not on file     Number of children: Not on file     Years of education: Not on file     Highest education level: Not on file   Occupational History     Not on file   Tobacco Use     Smoking status: Former     Packs/day: 1.50     Years: 2.00     Pack years: 3.00     Types: Cigarettes     Start date: 1997     Quit date: 1999     Years since quittin.1     Smokeless tobacco: Never   Vaping Use     Vaping Use: Never used   Substance and Sexual Activity     Alcohol use: Not Currently     Comment: rare     Drug use: Not Currently     Types: Marijuana     Sexual activity: Not Currently     Partners: Male     Birth control/protection: None   Other Topics Concern     Parent/sibling w/ CABG, MI or angioplasty  before 65F 55M? No   Social History Narrative     Not on file     Social Determinants of Health     Financial Resource Strain: Not on file   Food Insecurity: Not on file   Transportation Needs: Not on file   Physical Activity: Not on file   Stress: Not on file   Social Connections: Not on file   Intimate Partner Violence: Not on file   Housing Stability: Not on file       Outpatient Encounter Medications as of 8/16/2023   Medication Sig Dispense Refill     albuterol (PROAIR HFA/PROVENTIL HFA/VENTOLIN HFA) 108 (90 Base) MCG/ACT inhaler INHALE 2 PUFFS BY MOUTH EVERY 6 HOURS AS NEEDED FOR SHORTNESS OF BREATH /DYSPNEA  OR  WHEEZING 18 g 4     amLODIPine (NORVASC) 10 MG tablet Take 1 tablet (10 mg) by mouth daily 90 tablet 3     chlorthalidone (HYGROTON) 25 MG tablet Take 1 tablet (25 mg) by mouth daily as needed (headache/high blood pressure) 30 tablet 2     diclofenac (VOLTAREN) 1 % topical gel Apply 2 grams to hands four times daily using enclosed dosing card. (Patient taking differently: as needed Apply 2 grams to hands four times daily using enclosed dosing card.) 100 g 1     Flaxseed, Linseed, (FLAX SEED OIL) 1000 MG capsule Take 1 capsule by mouth as needed        lisinopril (ZESTRIL) 40 MG tablet Take 1 tablet (40 mg) by mouth daily 90 tablet 3     meclizine (ANTIVERT) 25 MG tablet TAKE 1 TABLET BY MOUTH EVERY 6 HOURS AS NEEDED FOR DIZZINESS 30 tablet 0     Multiple Vitamins-Minerals (MULTIVITAMIN ADULT PO)        Omega-3 Fatty Acids (OMEGA-3 FISH OIL PO)        omeprazole (PRILOSEC) 20 MG DR capsule Take 1 capsule (20 mg) by mouth daily 90 capsule 3     order for DME Equipment being ordered: [address]$254   Walking Boot, [employer], MED 1 Device 0     pravastatin (PRAVACHOL) 40 MG tablet Take 1 tablet (40 mg) by mouth daily 90 tablet 3     triamcinolone (KENALOG) 0.1 % cream Apply sparingly to affected area three times daily as needed for up to 14 days 45 g 0     zolpidem (AMBIEN) 10 MG tablet Take 1 tablet (10  mg) by mouth nightly as needed for sleep Needs to establish new primary care provider for further refills 30 tablet 0     No facility-administered encounter medications on file as of 8/16/2023.             O:   NAD, WDWN, Alert & Oriented, Mood & Affect wnl, Vitals stable   Here today alone   LMP 08/16/2021 (Approximate)    General appearance normal   Vitals stable   Alert, oriented and in no acute distress      Brown stuck on papules on back and lower chest   Brown firm papule with positive dimple sign on left medial knee      Eyes: Conjunctivae/lids:Normal     ENT: Lips: normal    MSK:Normal    Pulm: Breathing Normal    Neuro/Psych: Orientation:Alert and Orientedx3 ; Mood/Affect:normal   A/P:  1. Seborrheic keratosis, dermatofibroma on left knee   It was a pleasure speaking to Benita Benz today.  BENIGN LESIONS DISCUSSED WITH PATIENT:  I discussed the specifics of tumor, prognosis, and genetics of benign lesions.  I explained that treatment of these lesions would be purely cosmetic and not medically neccessary.  I discussed with patient different removal options including excision, cautery and /or laser.      Nature and genetics of benign skin lesions dicussed with patient.  Signs and Symptoms of skin cancer discussed with patient.  ABCDEs of melanoma reviewed with patient.  Patient encouraged to perform monthly skin exams.  UV precautions reviewed with patient.  Risks of non-melanoma skin cancer discussed with patient   Return to clinic in one year or sooner if needed.       Again, thank you for allowing me to participate in the care of your patient.        Sincerely,        Adela Martinez PA-C

## 2023-09-06 NOTE — PROGRESS NOTES
Small Joint Injection/Arthrocentesis    Date/Time: 11/10/2021 2:58 PM  Performed by: Amos Arreola DPM  Authorized by: Amos Arreola DPM   Indications:  Pain  Needle Size:  25 G      Location:  Foot   Location comment:  Right heel                      Medications:  10 mg triamcinolone acetonide 10 MG/ML; 1 mL lidocaine (PF) 1 %; 4 mg dexamethasone 4 MG/ML        Outcome:  Tolerated well, no immediate complications  Procedure discussed: discussed risks, benefits, and alternatives    Consent Given by:  Patient  Timeout: timeout called immediately prior to procedure    Prep: patient was prepped and draped in usual sterile fashion          [facility] [address]  [address] [facility] [id number] [phone]  Dept: [phone]  ______________________________________________________________________________    Patient: Benita Benz   : 1965   MRN: [id number]   November 10, 2021    INVASIVE PROCEDURE SAFETY CHECKLIST    Date: 11/10/2021   Procedure: Right heel injection  Patient Name: Benita Benz  MRN: [id number]  YOB: 1965    Action: Complete sections as appropriate. Any discrepancy results in a HARD COPY until resolved.     PRE PROCEDURE:  Patient ID verified with 2 identifiers (name and  or MRN): Yes  Procedure and site verified with patient/designee (when able): Yes  Accurate consent documentation in medical record: Yes  H&P (or appropriate assessment) documented in medical record: NA  H&P must be up to 20 days prior to procedure and updates within 24 hours of procedure as applicable: NA  Relevant diagnostic and radiology test results appropriately labeled and displayed as applicable: Yes  Procedure site(s) marked with provider initials: NA    TIMEOUT:  Time-Out performed immediately prior to starting procedure, including verbal and active participation of all team members addressing the following:Yes  * Correct  patient identify  * Confirmed that the correct side and site are marked  * An accurate procedure consent form  * Agreement on the procedure to be done  * Correct patient position  * Relevant images and results are properly labeled and appropriately displayed  * The need to administer antibiotics or fluids for irrigation purposes during the procedure as applicable   * Safety precautions based on patient history or medication use    DURING PROCEDURE: Verification of correct person, site, and procedures any time the responsibility for care of the patient is transferred to another member of the care team.       The following medications were given:         Prior to injection, verified patient identity using patient's name and date of birth.  Due to injection administration, patient instructed to remain in clinic for 15 minutes  afterwards, and to report any adverse reaction to me immediately.      Medication Name: Kenalog-10, 50 mg  Per 5 ml   NDC: 7276-8719-85  Drug Amount Wasted:  4 ml  Vial/Syringe: Multi dose vial  Expiration Date:  SEP 2022      Medication Name: Lidocaine 1%, 50 mg per 5 ml   NDC: 78207-449-89  Drug Amount Wasted:  Yes, 4 ml  Vial/Syringe: Multi dose vial  Expiration Date: 07/24    Scribed by Katya Slaughter LPN for Dr. Arreola on November 10, 2021 at based on the provider's statements to me.     Katya Slaughter LPN         Instructed patient/caregiver regarding signs and symptoms of infection./Elevate the injured extremity as instructed./Keep the cast/splint/dressing clean and dry.

## 2023-09-14 ENCOUNTER — OFFICE VISIT (OUTPATIENT)
Dept: FAMILY MEDICINE | Facility: CLINIC | Age: 58
End: 2023-09-14
Payer: COMMERCIAL

## 2023-09-14 VITALS
DIASTOLIC BLOOD PRESSURE: 80 MMHG | SYSTOLIC BLOOD PRESSURE: 118 MMHG | HEART RATE: 81 BPM | OXYGEN SATURATION: 95 % | WEIGHT: 204 LBS | BODY MASS INDEX: 32.02 KG/M2 | TEMPERATURE: 98.1 F | HEIGHT: 67 IN

## 2023-09-14 DIAGNOSIS — E78.5 DYSLIPIDEMIA WITH HIGH LDL AND LOW HDL: ICD-10-CM

## 2023-09-14 DIAGNOSIS — K21.9 GASTROESOPHAGEAL REFLUX DISEASE WITHOUT ESOPHAGITIS: ICD-10-CM

## 2023-09-14 DIAGNOSIS — I10 BENIGN ESSENTIAL HYPERTENSION: ICD-10-CM

## 2023-09-14 DIAGNOSIS — M54.41 CHRONIC RIGHT-SIDED LOW BACK PAIN WITH RIGHT-SIDED SCIATICA: Primary | ICD-10-CM

## 2023-09-14 DIAGNOSIS — G89.29 CHRONIC RIGHT-SIDED LOW BACK PAIN WITH RIGHT-SIDED SCIATICA: Primary | ICD-10-CM

## 2023-09-14 PROCEDURE — 99214 OFFICE O/P EST MOD 30 MIN: CPT | Performed by: NURSE PRACTITIONER

## 2023-09-14 RX ORDER — METHOCARBAMOL 500 MG/1
500-1000 TABLET, FILM COATED ORAL 4 TIMES DAILY PRN
Qty: 60 TABLET | Refills: 0 | Status: SHIPPED | OUTPATIENT
Start: 2023-09-14

## 2023-09-14 RX ORDER — METHYLPREDNISOLONE 4 MG
TABLET, DOSE PACK ORAL
Qty: 21 TABLET | Refills: 0 | Status: SHIPPED | OUTPATIENT
Start: 2023-09-14 | End: 2024-02-05

## 2023-09-14 RX ORDER — LISINOPRIL 40 MG/1
40 TABLET ORAL DAILY
Qty: 90 TABLET | Refills: 3 | Status: SHIPPED | OUTPATIENT
Start: 2023-09-14

## 2023-09-14 RX ORDER — AMLODIPINE BESYLATE 10 MG/1
10 TABLET ORAL DAILY
Qty: 90 TABLET | Refills: 3 | Status: SHIPPED | OUTPATIENT
Start: 2023-09-14

## 2023-09-14 RX ORDER — PRAVASTATIN SODIUM 40 MG
40 TABLET ORAL DAILY
Qty: 90 TABLET | Refills: 3 | Status: SHIPPED | OUTPATIENT
Start: 2023-09-14

## 2023-09-14 ASSESSMENT — ENCOUNTER SYMPTOMS: BACK PAIN: 1

## 2023-09-14 ASSESSMENT — PAIN SCALES - GENERAL: PAINLEVEL: EXTREME PAIN (8)

## 2023-09-14 NOTE — PROGRESS NOTES
"  {PROVIDER CHARTING PREFERENCE:469696}    Bobby Joy is a 58 year old, presenting for the following health issues:  Establish Care and Back Pain      9/14/2023    12:00 PM   Additional Questions   Roomed by ALIZA   Accompanied by self       Back Pain          {SUPERLIST (Optional):556019}  {additonal problems for provider to add (Optional):329640}      Review of Systems   Musculoskeletal:  Positive for back pain.      {ROS COMP (Optional):123749}      Objective    /80   Pulse 81   Temp 98.1  F (36.7  C)   Ht 1.702 m (5' 7\")   Wt 92.5 kg (204 lb)   LMP 08/16/2021 (Approximate)   SpO2 95%   BMI 31.95 kg/m    Body mass index is 31.95 kg/m .  Physical Exam   {Exam List (Optional):895673}    {Diagnostic Test Results (Optional):487259}    {AMBULATORY ATTESTATION (Optional):262219}              "

## 2023-09-14 NOTE — PROGRESS NOTES
"  Assessment & Plan     Chronic right-sided low back pain with right-sided sciatica  Referral for PT.   Trial of medrol Dosepak.  She has been on methocarbamol for other issues in the past, this was refilled and she can try this as needed.    Follow-up if not improving with PT.   - Physical Therapy Referral; Future  - methocarbamol (ROBAXIN) 500 MG tablet; Take 1-2 tablets (500-1,000 mg) by mouth 4 times daily as needed for muscle spasms or other (pain)  - methylPREDNISolone (MEDROL DOSEPAK) 4 MG tablet therapy pack; Follow Package Directions    Benign essential hypertension  Chronic, stable.  Continue current medications.   - Basic metabolic panel  (Ca, Cl, CO2, Creat, Gluc, K, Na, BUN); Future  - amLODIPine (NORVASC) 10 MG tablet; Take 1 tablet (10 mg) by mouth daily  - lisinopril (ZESTRIL) 40 MG tablet; Take 1 tablet (40 mg) by mouth daily    Gastroesophageal reflux disease without esophagitis  Chronic, stable. Continue omeprazole.   - omeprazole (PRILOSEC) 20 MG DR capsule; Take 1 capsule (20 mg) by mouth daily    Dyslipidemia with high LDL and low HDL  Chronic, stable.  Continue pravastatin.   - Lipid panel reflex to direct LDL Fasting; Future  - pravastatin (PRAVACHOL) 40 MG tablet; Take 1 tablet (40 mg) by mouth daily       BMI:   Estimated body mass index is 31.95 kg/m  as calculated from the following:    Height as of this encounter: 1.702 m (5' 7\").    Weight as of this encounter: 92.5 kg (204 lb).       Stacey Carlos, [facility]   Benita is a 58 year old, presenting for the following health issues:  Establish Care and Back Pain      9/14/2023    12:00 PM   Additional Questions   Roomed by ALIZA   Accompanied by self       Back Pain     History of Present Illness       Back Pain:  She presents for follow up of back pain. Patient's back pain is a chronic problem.  Location of back pain:  Right lower back, left middle of back, right buttock and right " "hip  Description of back pain: fullness and shooting  Back pain spreads: right buttocks    Since patient first noticed back pain, pain is: always present, but gets better and worse  Does back pain interfere with her job:  Yes       Reason for visit:  Right hip pain & lower back pain    She eats 2-3 servings of fruits and vegetables daily.She consumes 2 sweetened beverage(s) daily.She exercises with enough effort to increase her heart rate 10 to 19 minutes per day.  She exercises with enough effort to increase her heart rate 3 or less days per week. She is missing 2 dose(s) of medications per week.       Right low back pain, right buttock, down to right lateral thigh.   Pain has been really bad.    Has been hard at work because her job is very physical.  Also hard to sit very long in a car.      No numbness or tingling.  No motor weakness.  No saddle anesthesia.   Taking Tylenol ES, sometimes helps, but not always.  Tries to avoid nsaids.   Seen for this before. States was referred to PT but didn't go because she was doing better at the time.     Needs refills on chronic meds.       Review of Systems   Musculoskeletal:  Positive for back pain.   ROS: 10 point ROS neg other than the symptoms noted above in the HPI and above patient answer.           Objective    /80   Pulse 81   Temp 98.1  F (36.7  C)   Ht 1.702 m (5' 7\")   Wt 92.5 kg (204 lb)   LMP 08/16/2021 (Approximate)   SpO2 95%   BMI 31.95 kg/m    Body mass index is 31.95 kg/m .  Physical Exam   GENERAL: healthy, alert and no distress  EYES: Eyes grossly normal to inspection, PERRL and conjunctivae and sclerae normal  RESP: lungs clear to auscultation - no rales, rhonchi or wheezes  CV: regular rate and rhythm, normal S1 S2, no S3 or S4, no murmur, click or rub, no peripheral edema and peripheral pulses strong  MS: no gross musculoskeletal defects noted, no edema  SKIN: no suspicious lesions or rashes  NEURO: Normal strength and tone, mentation " intact and speech normal  PSYCH: mentation appears normal, affect normal/bright

## 2023-09-26 ENCOUNTER — ANCILLARY PROCEDURE (OUTPATIENT)
Dept: MAMMOGRAPHY | Facility: CLINIC | Age: 58
End: 2023-09-26
Payer: COMMERCIAL

## 2023-09-26 DIAGNOSIS — Z12.31 VISIT FOR SCREENING MAMMOGRAM: ICD-10-CM

## 2023-09-26 PROCEDURE — 77063 BREAST TOMOSYNTHESIS BI: CPT | Mod: GC

## 2023-09-26 PROCEDURE — 77067 SCR MAMMO BI INCL CAD: CPT | Mod: GC

## 2023-12-16 ENCOUNTER — HEALTH MAINTENANCE LETTER (OUTPATIENT)
Age: 58
End: 2023-12-16

## 2024-01-15 NOTE — PROGRESS NOTES
Chief Complaint:   Chief Complaint   Patient presents with     Right Foot - Follow Up          Allergies   Allergen Reactions     Hmg-Coa-R Inhibitors Other (See Comments)     Numbness is legs     Simvastatin      Periferal neuropathy         Subjective: Benita is a 55 year old female who presents to the clinic today for a follow up of right foot plantar fasciitis.  She relates that she did wear the boot for a number of weeks.  She relates that she has had a decrease in the pain.  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    Objective  Data Unavailable Data Unavailable Data Unavailable Data Unavailable Data Unavailable 0 lbs 0 oz  Pain is noted today with lateral compression of the right calcaneus.  She does have some pain noted with palpation of the medial attachment of the plantar fascia on the calcaneus of the right.  No pain noted along the courses of the PT, Achilles, or peroneal tendons on the right side.  No edema or ecchymosis noted.    Assessment: Plantar fasciitis of the right foot.  She may have had a stress fracture in the calcaneus, however x-rays were unequivocal.    Plan:   - Pt seen and evaluated  -She can start to go back to her normal activity.  If she needs to walk further, she should wear the boot.  -I have recommended she try some physical therapy ultrasound.  She will do this.  Order was written.  - Pt to return to clinic in 1 month virtually.     Continued Stay Note  [facility]     Patient Name: Di Lopez  MRN: [id number]  Today's Date: 1/15/2024    Admit Date: 1/1/2024    Plan: Home with [facility]   Discharge Plan       Row Name 01/15/24 1547       Plan    Plan Comments MSW was notified that pt requested meal resources. MSW gathered resources on meals for pt and provided them to pt at bedside.                                                     Discharge Codes    No documentation.                 Expected Discharge Date and Time       Expected Discharge Date Expected Discharge Time    Matty 15, 2024               SONJA Dawn

## 2024-02-05 ENCOUNTER — LAB (OUTPATIENT)
Dept: LAB | Facility: CLINIC | Age: 59
End: 2024-02-05
Attending: PHYSICIAN ASSISTANT
Payer: COMMERCIAL

## 2024-02-05 ENCOUNTER — TELEPHONE (OUTPATIENT)
Dept: FAMILY MEDICINE | Facility: OTHER | Age: 59
End: 2024-02-05

## 2024-02-05 ENCOUNTER — VIRTUAL VISIT (OUTPATIENT)
Dept: FAMILY MEDICINE | Facility: OTHER | Age: 59
End: 2024-02-05
Payer: COMMERCIAL

## 2024-02-05 DIAGNOSIS — U07.1 INFECTION DUE TO 2019 NOVEL CORONAVIRUS: ICD-10-CM

## 2024-02-05 DIAGNOSIS — U07.1 INFECTION DUE TO 2019 NOVEL CORONAVIRUS: Primary | ICD-10-CM

## 2024-02-05 PROCEDURE — 87635 SARS-COV-2 COVID-19 AMP PRB: CPT

## 2024-02-05 PROCEDURE — 99213 OFFICE O/P EST LOW 20 MIN: CPT | Mod: 95 | Performed by: PHYSICIAN ASSISTANT

## 2024-02-05 ASSESSMENT — ASTHMA QUESTIONNAIRES
ACT_TOTALSCORE: 24
QUESTION_5 LAST FOUR WEEKS HOW WOULD YOU RATE YOUR ASTHMA CONTROL: WELL CONTROLLED
QUESTION_3 LAST FOUR WEEKS HOW OFTEN DID YOUR ASTHMA SYMPTOMS (WHEEZING, COUGHING, SHORTNESS OF BREATH, CHEST TIGHTNESS OR PAIN) WAKE YOU UP AT NIGHT OR EARLIER THAN USUAL IN THE MORNING: NOT AT ALL
ACT_TOTALSCORE: 24
QUESTION_2 LAST FOUR WEEKS HOW OFTEN HAVE YOU HAD SHORTNESS OF BREATH: NOT AT ALL
QUESTION_4 LAST FOUR WEEKS HOW OFTEN HAVE YOU USED YOUR RESCUE INHALER OR NEBULIZER MEDICATION (SUCH AS ALBUTEROL): NOT AT ALL
QUESTION_1 LAST FOUR WEEKS HOW MUCH OF THE TIME DID YOUR ASTHMA KEEP YOU FROM GETTING AS MUCH DONE AT WORK, SCHOOL OR AT HOME: NONE OF THE TIME

## 2024-02-05 NOTE — LETTER
February 5, 2024      Benita Benz  [address] [address] [facility] [id number]        To Whom It May Concern:    Benita Benz  was seen on 02/05/24. She had testing done through another job and she was positive on 1/29/2024 for COVID.  We are obtaining COVID testing via PCR but it will take 12-24 hours for the result to come back.  Would not advise that she works until the result is back and if she is positive for COVID would advise that she does not work for 10 days from onset of symptoms UNLESS Her symptoms are significantly improved by day 5 then she can resume working with masking in place.          Sincerely,        Katja Ware PA-C

## 2024-02-05 NOTE — COMMUNITY RESOURCES LIST (ENGLISH)
02/05/2024   [facility]  N/A  For questions about this resource list or additional care needs, please contact your primary care clinic or care manager.  Phone: [phone]   Email: N/A   Address: [address] [place] [id number]   Hours: N/A        Food and Nutrition       Food pantry  1  [facility] ([facility] Food Shelf) Distance: 3.03 miles      Pickup   [address] [place] [address]  Language: English  Hours: Mon 10:00 AM - 3:00 PM , Tue 12:00 PM - 5:00 PM , Wed 10:00 AM - 3:00 PM , Thu 2:00 PM - 7:00 PM  Fees: Free   Phone: (539) 322-3144 Email: support@[facility].[employer] Website: http://[employer].org     2  [facility] - Family Table Meals - [facility] Food [facility] Distance: 6.06 miles      Pickup   [address] [place] [address]  Language: English  Hours: Wed 12:00 PM - 2:00 PM  Fees: Free   Phone: (711) 427-7862 Email: Evisors@[employer] Website: http://www.[employer].org     SNAP application assistance  3  [facility] Economic Assistance Department Distance: 10.89 miles      Phone/Virtual   [address] [address]  Language: English  Hours: Mon - Fri 8:15 AM - 4:00 PM  Fees: Free   Phone: (749) 271-9668 Email: paperwork@co.[place]ard.mn. Website: http://www.Willardcielo24./193/Economic-Assistance     4  [facility] Distance: 11.3 miles      Phone/Virtual   [address] [place] [address]  Language: English, Singaporean  Hours: Mon 9:00 AM - 1:00 PM , Tue - Thu 9:00 AM - 4:00 PM , Fri 9:00 AM - 1:00 PM  Fees: Free   Phone: (328) 718-2734 Email: info@[employer].org Website: http://www.Gozentarvest.org/     Soup kitchen or free meals  5  [facility] - [facility] Meals Distance: 3.15 miles      In-Person   [address] [place] [address]  Language: English  Hours: Wed 5:30 PM - 6:15 PM  Fees: Free   Phone: (384) 252-8305 Email: amelia@[facility]  Website: http://www.[employer].org/     6  [facility] - Family Table Meals - Family Table Meal Distance: 6.06 miles      [facility]   [address] [place] [address]  Language: English  Hours: Thu 5:00 PM - 6:30 PM  Fees: Free   Phone: (843) 290-8039 Email: francie@[employer] Website: http://www.[facility].[employer]etera          Important Numbers & Websites       Emergency Services   911  [facility] Services   311  Poison Control   (512) 178-1860  Suicide Prevention Lifeline   (538) 355-7752 (TALK)  Child Abuse Hotline   (913) 759-4702 (4-A-Child)  Sexual Assault Hotline   (595) 456-2502 (HOPE)  National Runaway Safeline   (104) 338-6349 (RUNAWAY)  All-Options Talkline   (985) 919-2350  Substance Abuse Referral   (614) 967-9911 (HELP)

## 2024-02-05 NOTE — PROGRESS NOTES
"    Instructions Relayed to Patient by Virtual Roomer:     Patient is active on [employer]:   Relayed following to patient: \"It looks like you are active on [employer], are you able to join the visit this way? If not, do you need us to send you a link now or would you like your provider to send a link via text or email when they are ready to initiate the visit?\"    Reminded patient to ensure they were logged on to virtual visit by arrival time listed. Documented in appointment notes if patient had flexibility to initiate visit sooner than arrival time. If pediatric virtual visit, ensured pediatric patient along with parent/guardian will be present for video visit.     Patient offered the website [email or web address] and/or phone number to [employer] Help line: [phone]   Benita is a 58 year old who is being evaluated via a billable video visit.      How would you like to obtain your AVS? Matomy Money  If the video visit is dropped, the invitation should be resent by: Text to cell phone: [phone]  Will anyone else be joining your video visit? No      Assessment & Plan     (U07.1) Infection due to 2019 novel coronavirus  (primary encounter diagnosis)  Comment: Positive for COVID. She is within treatment window but she wants to just continue with OTC management as she has had COVID in the past and done well.  She just mainly needs testing for one of her jobs to confirm why she needs to be out.  Did write a letter and recommended 10 days out but if not able and she is feeling better then can resume at day 5 with masking.  We will update her with test results. Recommended OTC management of symptoms and follow-up if not improving through the week.   Plan: Symptomatic COVID-19 Virus (Coronavirus) by PCR        Nose            Options for treatment and follow-up care were reviewed with the patient and/or guardian. Patient and/or guardian engaged in the decision making process and verbalized understanding of the " options discussed and agreed with the final plan.           Bobby Joy is a 58 year old, presenting for the following health issues:  Covid Concern      2/5/2024     8:13 AM   Additional Questions   Roomed by sepideh   Accompanied by self     History of Present Illness       Reason for visit:  Tested Positive for Covid    She eats 2-3 servings of fruits and vegetables daily.She consumes 0 sweetened beverage(s) daily.She exercises with enough effort to increase her heart rate 9 or less minutes per day.  She exercises with enough effort to increase her heart rate 3 or less days per week.   She is taking medications regularly.   Patient has lost of taste and headache      COVID-19 Symptom Review  How many days ago did these symptoms start? 1/29    Are any of the following symptoms significant for you?  New or worsening difficulty breathing? No  Worsening cough? Yes, I am coughing up mucus.  Fever or chills? No  Headache: YES  Sore throat: No  Chest pain: No  Diarrhea: No  Body aches? YES  Loss of taste     What treatments has patient tried? Acetaminophen and Nyquil  Does patient live in a nursing home, group home, or shelter? No  Does patient have a way to get food/medications during quarantined? Yes, I have a friend or family member who can help me.        Review of Systems  Constitutional, HEENT, cardiovascular, pulmonary, gi and gu systems are negative, except as otherwise noted.      Objective           Vitals:  No vitals were obtained today due to virtual visit.    Physical Exam   GENERAL: alert and no distress  EYES: Eyes grossly normal to inspection.  No discharge or erythema, or obvious scleral/conjunctival abnormalities.  RESP: No audible wheeze, cough, or visible cyanosis.    SKIN: Visible skin clear. No significant rash, abnormal pigmentation or lesions.  NEURO: Cranial nerves grossly intact.  Mentation and speech appropriate for age.  PSYCH: Appropriate affect, tone, and pace of  words    Video-Visit Details    Type of service:  Video Visit   Video Start Time:  9:02 AM  Video End Time:9:10 AM    Originating Location (pt. Location): Home  Distant Location (provider location):  Off-site  Platform used for Video Visit: Anju  Signed Electronically by: Katja Ware PA-C

## 2024-02-05 NOTE — TELEPHONE ENCOUNTER
FYI - Status Update    Who is Calling: patient    Update: Per previous message, employer needs a doctor note that is signed by provider. Number to fax paper to is: [phone]. Attn: Sha Menendez    Patient would also a copy of it mailed to her for her records.      Does caller want a call/response back: No

## 2024-02-05 NOTE — TELEPHONE ENCOUNTER
Left message for patient to call back.  Letter faxed to [phone]. Attn: Sha Menendez and letter mailed to patient.

## 2024-02-05 NOTE — TELEPHONE ENCOUNTER
Per another closed encounter:        Number to fax paper to is: [phone]. Attn: Sha Menendez     Patient would also a copy of it mailed to her for her records.

## 2024-02-05 NOTE — TELEPHONE ENCOUNTER
Patient calling to state her one job will not accept the letter from her mychart to be excused from work unless it is signed by the provider.     Informed to contact that employer and ask for a fax number that letter can be printed, signed and faxed to the employer. Patient will call back to up date with fax number if she is able to obtain the information. Coty Ledesma LPN

## 2024-02-06 ENCOUNTER — TELEPHONE (OUTPATIENT)
Dept: NURSING | Facility: CLINIC | Age: 59
End: 2024-02-06
Payer: COMMERCIAL

## 2024-02-06 ENCOUNTER — TELEPHONE (OUTPATIENT)
Dept: FAMILY MEDICINE | Facility: CLINIC | Age: 59
End: 2024-02-06
Payer: COMMERCIAL

## 2024-02-06 LAB — SARS-COV-2 RNA RESP QL NAA+PROBE: POSITIVE

## 2024-02-06 NOTE — TELEPHONE ENCOUNTER
Patient calling in to ask if her Covid results were back.  Per chart review, test was still in process.  Patient stated she would watch her mychart.  Instructed patient to isolate until she knows and to have good hand hygiene as well.  Patient verbalized understanding.      Kristina Kjellberg, MSN, RN

## 2024-02-06 NOTE — TELEPHONE ENCOUNTER
Coronavirus (COVID-19) Notification    Caller Name (Patient, parent, daughter/son, grandparent, etc)  Patient    Reason for call  Notify of Positive Coronavirus (COVID-19) lab results, assess symptoms,  review [facility] recommendations    Lab Result    Lab test:  2019-nCoV rRt-PCR or SARS-CoV-2 PCR    Oropharyngeal AND/OR nasopharyngeal swabs is POSITIVE for 2019-nCoV RNA/SARS-COV-2 PCR (COVID-19 virus)    Gather patient reported symptoms   Assessment   Current Symptoms at time of phone call, reported by patient: (if no symptoms, document: No symptoms] Cough, Headache, Fatigue, light SOB   Date of symptom(s) onset (if applicable) 1/30/24     If at time of call, Patients symptoms have worsened, the Patient should contact 911 or have someone drive them to Emergency Dept promptly:    If Patient calling 911, inform 911 personal that you have tested positive for the Coronavirus (COVID-19).  Place mask on and await 911 to arrive.  If Emergency Dept, If possible, please have another adult drive you to the Emergency Dept but you need to wear mask when in contact with other people.      Treatment Options:   Is patient interested in discussing COVID treatment? No.        Review information with Patient    Your result was positive. This means you have COVID-19 (coronavirus).    How can I protect others?    These guidelines are for isolating before returning to work, school or .    If you DO have symptoms  Stay home and away from others   For at least 5 days after your symptoms started, AND  You are fever free for 24 hours (with no medicine that reduces fever), AND  Your other symptoms are better  Wear a mask for 10 full days anytime you are around others    If you DON'T have symptoms  Stay home and away from others for at least 5 days after your positive test  Wear a mask for 10 full days anytime you are around others    There may be different guidelines for healthcare facilities.  Please check with the specific  sites before arriving.    If you have been told by a doctor that you were severely ill with COVID-19 or are immunocompromised, you should isolate for at least 10 days.    You should not go back to work until you meet the guidelines above for ending your home isolation. You don't need to be retested for COVID-19 before going back to work--studies show that you won't spread the virus if it's been at least 10 days since your symptoms started (or 20 days, if you have a weak immune system).    Employers, schools, and daycares: This is an official notice for this person's medical guidelines for returning in-person.  They must meet the above guidelines before going back to work, school or  in person.    You will receive a positive COVID-19 letter via [employer] or the mail soon with additional self-care information.    Would you like me to review some of that information with you now?  No    If you were tested for an upcoming procedure, please contact your provider for next steps.    CICI LEVINE

## 2024-02-06 NOTE — TELEPHONE ENCOUNTER
See additional 2/6/24 telephone encounters. Pt calling back stating employer stated the signed, faxed, work excuse letter confirming her COVID-19 dx was not received because their fax isn't working. RN informed her a copy of the letter has also been mailed to her to give to the employer, but if she calls back with an alternate fax number a signed letter can be refaxed.     Tess Guadalupe, SHREYAN, RN

## 2024-02-19 DIAGNOSIS — J45.20 MILD INTERMITTENT ASTHMA WITHOUT COMPLICATION: ICD-10-CM

## 2024-02-19 RX ORDER — ALBUTEROL SULFATE 90 UG/1
AEROSOL, METERED RESPIRATORY (INHALATION)
Qty: 18 G | Refills: 4 | Status: SHIPPED | OUTPATIENT
Start: 2024-02-19

## 2025-01-12 ENCOUNTER — HEALTH MAINTENANCE LETTER (OUTPATIENT)
Age: 60
End: 2025-01-12

## 2025-02-19 ENCOUNTER — OFFICE VISIT (OUTPATIENT)
Dept: FAMILY MEDICINE | Facility: CLINIC | Age: 60
End: 2025-02-19
Payer: COMMERCIAL

## 2025-02-19 VITALS
RESPIRATION RATE: 20 BRPM | HEART RATE: 75 BPM | TEMPERATURE: 97.5 F | WEIGHT: 220 LBS | SYSTOLIC BLOOD PRESSURE: 168 MMHG | DIASTOLIC BLOOD PRESSURE: 98 MMHG | OXYGEN SATURATION: 98 % | HEIGHT: 66 IN | BODY MASS INDEX: 35.36 KG/M2

## 2025-02-19 DIAGNOSIS — M25.50 ARTHRALGIA, UNSPECIFIED JOINT: ICD-10-CM

## 2025-02-19 DIAGNOSIS — J45.20 MILD INTERMITTENT ASTHMA WITHOUT COMPLICATION: ICD-10-CM

## 2025-02-19 DIAGNOSIS — Z00.00 ROUTINE GENERAL MEDICAL EXAMINATION AT A HEALTH CARE FACILITY: Primary | ICD-10-CM

## 2025-02-19 DIAGNOSIS — E66.812 CLASS 2 SEVERE OBESITY DUE TO EXCESS CALORIES WITH SERIOUS COMORBIDITY AND BODY MASS INDEX (BMI) OF 35.0 TO 35.9 IN ADULT (H): ICD-10-CM

## 2025-02-19 DIAGNOSIS — I10 BENIGN ESSENTIAL HYPERTENSION: ICD-10-CM

## 2025-02-19 DIAGNOSIS — E66.01 CLASS 2 SEVERE OBESITY DUE TO EXCESS CALORIES WITH SERIOUS COMORBIDITY AND BODY MASS INDEX (BMI) OF 35.0 TO 35.9 IN ADULT (H): ICD-10-CM

## 2025-02-19 DIAGNOSIS — R25.2 MUSCLE CRAMPING: ICD-10-CM

## 2025-02-19 DIAGNOSIS — B37.2 CANDIDAL INTERTRIGO: ICD-10-CM

## 2025-02-19 DIAGNOSIS — E78.5 DYSLIPIDEMIA WITH HIGH LDL AND LOW HDL: ICD-10-CM

## 2025-02-19 DIAGNOSIS — Z12.11 SCREEN FOR COLON CANCER: ICD-10-CM

## 2025-02-19 DIAGNOSIS — Z12.31 ENCOUNTER FOR SCREENING MAMMOGRAM FOR BREAST CANCER: ICD-10-CM

## 2025-02-19 LAB
BASOPHILS # BLD AUTO: 0.1 10E3/UL (ref 0–0.2)
BASOPHILS NFR BLD AUTO: 1 %
EOSINOPHIL # BLD AUTO: 0.3 10E3/UL (ref 0–0.7)
EOSINOPHIL NFR BLD AUTO: 5 %
ERYTHROCYTE [DISTWIDTH] IN BLOOD BY AUTOMATED COUNT: 13.6 % (ref 10–15)
EST. AVERAGE GLUCOSE BLD GHB EST-MCNC: 123 MG/DL
HBA1C MFR BLD: 5.9 % (ref 0–5.6)
HCT VFR BLD AUTO: 38.2 % (ref 35–47)
HGB BLD-MCNC: 12.5 G/DL (ref 11.7–15.7)
IMM GRANULOCYTES # BLD: 0 10E3/UL
IMM GRANULOCYTES NFR BLD: 0 %
LYMPHOCYTES # BLD AUTO: 3.9 10E3/UL (ref 0.8–5.3)
LYMPHOCYTES NFR BLD AUTO: 55 %
MCH RBC QN AUTO: 28.5 PG (ref 26.5–33)
MCHC RBC AUTO-ENTMCNC: 32.7 G/DL (ref 31.5–36.5)
MCV RBC AUTO: 87 FL (ref 78–100)
MONOCYTES # BLD AUTO: 0.5 10E3/UL (ref 0–1.3)
MONOCYTES NFR BLD AUTO: 7 %
NEUTROPHILS # BLD AUTO: 2.3 10E3/UL (ref 1.6–8.3)
NEUTROPHILS NFR BLD AUTO: 33 %
NRBC # BLD AUTO: 0 10E3/UL
NRBC BLD AUTO-RTO: 0 /100
PLATELET # BLD AUTO: 360 10E3/UL (ref 150–450)
RBC # BLD AUTO: 4.39 10E6/UL (ref 3.8–5.2)
WBC # BLD AUTO: 7 10E3/UL (ref 4–11)

## 2025-02-19 PROCEDURE — 84443 ASSAY THYROID STIM HORMONE: CPT | Performed by: NURSE PRACTITIONER

## 2025-02-19 PROCEDURE — 82306 VITAMIN D 25 HYDROXY: CPT | Performed by: NURSE PRACTITIONER

## 2025-02-19 PROCEDURE — 82728 ASSAY OF FERRITIN: CPT | Performed by: NURSE PRACTITIONER

## 2025-02-19 PROCEDURE — 80061 LIPID PANEL: CPT | Performed by: NURSE PRACTITIONER

## 2025-02-19 PROCEDURE — 80053 COMPREHEN METABOLIC PANEL: CPT | Performed by: NURSE PRACTITIONER

## 2025-02-19 PROCEDURE — 83036 HEMOGLOBIN GLYCOSYLATED A1C: CPT | Performed by: NURSE PRACTITIONER

## 2025-02-19 PROCEDURE — 85025 COMPLETE CBC W/AUTO DIFF WBC: CPT | Performed by: NURSE PRACTITIONER

## 2025-02-19 PROCEDURE — 36415 COLL VENOUS BLD VENIPUNCTURE: CPT | Performed by: NURSE PRACTITIONER

## 2025-02-19 RX ORDER — NYSTATIN 100000 [USP'U]/G
POWDER TOPICAL 2 TIMES DAILY PRN
Qty: 30 G | Refills: 5 | Status: SHIPPED | OUTPATIENT
Start: 2025-02-19

## 2025-02-19 RX ORDER — ALBUTEROL SULFATE 90 UG/1
INHALANT RESPIRATORY (INHALATION)
Qty: 18 G | Refills: 4 | Status: SHIPPED | OUTPATIENT
Start: 2025-02-19

## 2025-02-19 RX ORDER — LISINOPRIL 40 MG/1
40 TABLET ORAL DAILY
Qty: 90 TABLET | Refills: 0 | Status: SHIPPED | OUTPATIENT
Start: 2025-02-19

## 2025-02-19 RX ORDER — CHLORTHALIDONE 25 MG/1
25 TABLET ORAL DAILY PRN
Qty: 30 TABLET | Refills: 2 | Status: SHIPPED | OUTPATIENT
Start: 2025-02-19

## 2025-02-19 RX ORDER — AMLODIPINE BESYLATE 10 MG/1
10 TABLET ORAL DAILY
Qty: 90 TABLET | Refills: 0 | Status: SHIPPED | OUTPATIENT
Start: 2025-02-19

## 2025-02-19 RX ORDER — PRAVASTATIN SODIUM 40 MG
40 TABLET ORAL DAILY
Qty: 90 TABLET | Refills: 3 | Status: SHIPPED | OUTPATIENT
Start: 2025-02-19

## 2025-02-19 SDOH — HEALTH STABILITY: PHYSICAL HEALTH: ON AVERAGE, HOW MANY MINUTES DO YOU ENGAGE IN EXERCISE AT THIS LEVEL?: PATIENT DECLINED

## 2025-02-19 SDOH — HEALTH STABILITY: PHYSICAL HEALTH
ON AVERAGE, HOW MANY DAYS PER WEEK DO YOU ENGAGE IN MODERATE TO STRENUOUS EXERCISE (LIKE A BRISK WALK)?: PATIENT DECLINED

## 2025-02-19 ASSESSMENT — ASTHMA QUESTIONNAIRES
QUESTION_4 LAST FOUR WEEKS HOW OFTEN HAVE YOU USED YOUR RESCUE INHALER OR NEBULIZER MEDICATION (SUCH AS ALBUTEROL): NOT AT ALL
ACT_TOTALSCORE: 23
QUESTION_1 LAST FOUR WEEKS HOW MUCH OF THE TIME DID YOUR ASTHMA KEEP YOU FROM GETTING AS MUCH DONE AT WORK, SCHOOL OR AT HOME: A LITTLE OF THE TIME
QUESTION_3 LAST FOUR WEEKS HOW OFTEN DID YOUR ASTHMA SYMPTOMS (WHEEZING, COUGHING, SHORTNESS OF BREATH, CHEST TIGHTNESS OR PAIN) WAKE YOU UP AT NIGHT OR EARLIER THAN USUAL IN THE MORNING: NOT AT ALL
QUESTION_5 LAST FOUR WEEKS HOW WOULD YOU RATE YOUR ASTHMA CONTROL: COMPLETELY CONTROLLED
QUESTION_2 LAST FOUR WEEKS HOW OFTEN HAVE YOU HAD SHORTNESS OF BREATH: ONCE OR TWICE A WEEK
ACT_TOTALSCORE: 23

## 2025-02-19 ASSESSMENT — SOCIAL DETERMINANTS OF HEALTH (SDOH): HOW OFTEN DO YOU GET TOGETHER WITH FRIENDS OR RELATIVES?: PATIENT DECLINED

## 2025-02-19 ASSESSMENT — PAIN SCALES - GENERAL: PAINLEVEL_OUTOF10: NO PAIN (0)

## 2025-02-19 NOTE — PATIENT INSTRUCTIONS
Patient Education   Preventive Care Advice   This is general advice given by our system to help you stay healthy. However, your care team may have specific advice just for you. Please talk to your care team about your preventive care needs.  Nutrition  Eat 5 or more servings of fruits and vegetables each day.  Try wheat bread, brown rice and whole grain pasta (instead of white bread, rice, and pasta).  Get enough calcium and vitamin D. Check the label on foods and aim for 100% of the RDA (recommended daily allowance).  Lifestyle  Exercise at least 150 minutes each week  (30 minutes a day, 5 days a week).  Do muscle strengthening activities 2 days a week. These help control your weight and prevent disease.  No smoking.  Wear sunscreen to prevent skin cancer.  Have a dental exam and cleaning every 6 months.  Yearly exams  See your health care team every year to talk about:  Any changes in your health.  Any medicines your care team has prescribed.  Preventive care, family planning, and ways to prevent chronic diseases.  Shots (vaccines)   HPV shots (up to age 26), if you've never had them before.  Hepatitis B shots (up to age 59), if you've never had them before.  COVID-19 shot: Get this shot when it's due.  Flu shot: Get a flu shot every year.  Tetanus shot: Get a tetanus shot every 10 years.  Pneumococcal, hepatitis A, and RSV shots: Ask your care team if you need these based on your risk.  Shingles shot (for age 50 and up)  General health tests  Diabetes screening:  Starting at age 35, Get screened for diabetes at least every 3 years.  If you are younger than age 35, ask your care team if you should be screened for diabetes.  Cholesterol test: At age 39, start having a cholesterol test every 5 years, or more often if advised.  Bone density scan (DEXA): At age 50, ask your care team if you should have this scan for osteoporosis (brittle bones).  Hepatitis C: Get tested at least once in your life.  STIs (sexually  transmitted infections)  Before age 24: Ask your care team if you should be screened for STIs.  After age 24: Get screened for STIs if you're at risk. You are at risk for STIs (including HIV) if:  You are sexually active with more than one person.  You don't use condoms every time.  You or a partner was diagnosed with a sexually transmitted infection.  If you are at risk for HIV, ask about PrEP medicine to prevent HIV.  Get tested for HIV at least once in your life, whether you are at risk for HIV or not.  Cancer screening tests  Cervical cancer screening: If you have a cervix, begin getting regular cervical cancer screening tests starting at age 21.  Breast cancer scan (mammogram): If you've ever had breasts, begin having regular mammograms starting at age 40. This is a scan to check for breast cancer.  Colon cancer screening: It is important to start screening for colon cancer at age 45.  Have a colonoscopy test every 10 years (or more often if you're at risk) Or, ask your provider about stool tests like a FIT test every year or Cologuard test every 3 years.  To learn more about your testing options, visit:   .  For help making a decision, visit:   https://bit.ly/wj35898.  Prostate cancer screening test: If you have a prostate, ask your care team if a prostate cancer screening test (PSA) at age 55 is right for you.  Lung cancer screening: If you are a current or former smoker ages 50 to 80, ask your care team if ongoing lung cancer screenings are right for you.  For informational purposes only. Not to replace the advice of your health care provider. Copyright   2023 [employer]. All rights reserved. Clinically reviewed by the [facility] Transitions Program. [employer] [id number] - REV 01/24.

## 2025-02-19 NOTE — PROGRESS NOTES
Preventive Care Visit  [facility]  CHIDI Matute CNP, Family Medicine  Feb 19, 2025      Assessment & Plan     Routine general medical examination at a health care facility  -next preventative care visit in one year.     Class 2 severe obesity due to excess calories with serious comorbidity and body mass index (BMI) of 35.0 to 35.9 in adult (H)  -diet and exercise guidelines discussed. Recommend follow up appointment to discuss weight loss medications after completing labs today.   - Vitamin D Deficiency  - TSH with free T4 reflex  - Comprehensive metabolic panel  - Hemoglobin A1c    Mild intermittent asthma without complication  -stable  - albuterol (PROAIR HFA/PROVENTIL HFA/VENTOLIN HFA) 108 (90 Base) MCG/ACT inhaler; INHALE 2 PUFFS BY MOUTH EVERY 6 HOURS AS NEEDED FOR SHORTNESS OF BREATH /DYSPNEA  OR  WHEEZING    Benign essential hypertension  -not controlled due to stopping medications, restart today, follow up 3-4 weeks recheck blood pressure  - amLODIPine (NORVASC) 10 MG tablet; Take 1 tablet (10 mg) by mouth daily.  - chlorthalidone (HYGROTON) 25 MG tablet; Take 1 tablet (25 mg) by mouth daily as needed (headache/high blood pressure over 140/90).  - lisinopril (ZESTRIL) 40 MG tablet; Take 1 tablet (40 mg) by mouth daily.  - Comprehensive metabolic panel    Encounter for screening mammogram for breast cancer  - MA Screening Bilateral w/ Jamil; Future    Screen for colon cancer  - Colonoscopy Screening  Referral; Future    Dyslipidemia with high LDL and low HDL  -restart  - pravastatin (PRAVACHOL) 40 MG tablet; Take 1 tablet (40 mg) by mouth daily.  - Lipid panel reflex to direct LDL Fasting    Muscle cramping  -differentials discussed with patient include electrolyte issues, dehydration, iron deficiency  - CBC with Platelets & Differential  - Ferritin    Arthralgia, unspecified joint  -issues with arthritis pain in multiple joints  - diclofenac (VOLTAREN) 1 % topical gel;  "Apply 2 g topically 4 times daily as needed for moderate pain.    Candidal intertrigo  -try to keep area clean and dry  - nystatin (MYCOSTATIN) 994028 UNIT/GM external powder; Apply topically 2 times daily as needed for other (skin irritation).        The longitudinal plan of care for the diagnosis(es)/condition(s) as documented were addressed during this visit. Due to the added complexity in care, I will continue to support Benita in the subsequent management and with ongoing continuity of care.      BMI  Estimated body mass index is 35.51 kg/m  as calculated from the following:    Height as of this encounter: 1.676 m (5' 6\").    Weight as of this encounter: 99.8 kg (220 lb).   Weight management plan: Discussed healthy diet and exercise guidelines    Counseling  Appropriate preventive services were addressed with this patient via screening, questionnaire, or discussion as appropriate for fall prevention, nutrition, physical activity, Tobacco-use cessation, social engagement, weight loss and cognition.  Checklist reviewing preventive services available has been given to the patient.  Reviewed patient's diet, addressing concerns and/or questions.   She is at risk for psychosocial distress and has been provided with information to reduce risk.           Subjective   Benita is a 59 year old, presenting for the following:  Physical        2/19/2025     1:12 PM   Additional Questions   Roomed by Jane GOODEN   Accompanied by self     Patient here for yearly preventative care visit. In addition, they have the following concerns:    1. Rashes under breasts that come and go- nystatin powder usually works well  2. Having lower back pains. Has gained weight and is not exercising as much as she should.   3. Muscle cramps in abdomen, legs, feet, stomach, back while stretching. Tried increasing fluid intake.   4. Ran out of her blood pressure medication since last April, she has been taking her 's hydralazine.   5. Having " some concerns about her memory and forgetting things.         Health Care Directive  Patient does not have a Health Care Directive: Discussed advance care planning with patient; information given to patient to review.      2/19/2025   General Health   How would you rate your overall physical health? Good   Feel stress (tense, anxious, or unable to sleep) To some extent   (!) STRESS CONCERN      2/19/2025   Nutrition   Three or more servings of calcium each day? (!) NO   Diet: Regular (no restrictions)    Low salt   How many servings of fruit and vegetables per day? (!) 2-3   How many sweetened beverages each day? (!) 3       Multiple values from one day are sorted in reverse-chronological order         2/19/2025   Exercise   Days per week of moderate/strenous exercise Patient declined   Average minutes spent exercising at this level Patient declined         2/19/2025   Social Factors   Frequency of gathering with friends or relatives Patient declined   Worry food won't last until get money to buy more Yes   Food not last or not have enough money for food? Patient declined   Do you have housing? (Housing is defined as stable permanent housing and does not include staying ouside in a car, in a tent, in an abandoned building, in an overnight shelter, or couch-surfing.) Yes   Are you worried about losing your housing? No   Lack of transportation? No   Unable to get utilities (heat,electricity)? Yes   Want help with housing or utility concern? No   (!) FOOD SECURITY CONCERN PRESENT(!) FINANCIAL RESOURCE STRAIN CONCERN      2/19/2025   Fall Risk   Fallen 2 or more times in the past year? No   Trouble with walking or balance? No          2/19/2025   Dental   Dentist two times every year? Yes            Today's PHQ-2 Score:       2/19/2025     1:24 PM   PHQ-2 ( 1999 [employer])   Q1: Little interest or pleasure in doing things 1   Q2: Feeling down, depressed or hopeless 0   PHQ-2 Score 1    Q1: Little interest or pleasure in  doing things Several days   Q2: Feeling down, depressed or hopeless Not at all   PHQ-2 Score 1       Patient-reported           2025   Substance Use   Alcohol more than 3/day or more than 7/wk Not Applicable   Do you use any other substances recreationally? No     Social History     Tobacco Use    Smoking status: Former     Current packs/day: 0.00     Average packs/day: 1.5 packs/day for 2.0 years (3.0 ttl pk-yrs)     Types: Cigarettes     Start date: 1997     Quit date: 1999     Years since quittin.6    Smokeless tobacco: Never   Vaping Use    Vaping status: Never Used   Substance Use Topics    Alcohol use: Not Currently     Comment: rare    Drug use: Not Currently     Types: Marijuana           2023   LAST FHS-7 RESULTS   1st degree relative breast or ovarian cancer No   Any relative bilateral breast cancer No   Any male have breast cancer No   Any ONE woman have BOTH breast AND ovarian cancer No   Any woman with breast cancer before 50yrs Yes   2 or more relatives with breast AND/OR ovarian cancer No   2 or more relatives with breast AND/OR bowel cancer No        Mammogram Screening - Mammogram every 1-2 years updated in Health Maintenance based on mutual decision making        2025   STI Screening   New sexual partner(s) since last STI/HIV test? No     History of abnormal Pap smear: No - age 30- 64 PAP with HPV every 5 years recommended        Latest Ref Rng & Units 2021     2:37 PM 2021    10:43 AM 1/10/2017    10:22 AM   PAP / HPV   PAP (Historical)  NIL      HPV 16 DNA NEG^Negative  Negative  Negative    HPV 18 DNA NEG^Negative  Negative  Negative    Other HR HPV NEG^Negative  Negative  Negative      ASCVD Risk   The 10-year ASCVD risk score (Spencer BARAJAS, et al., 2019) is: 21.2%    Values used to calculate the score:      Age: 59 years      Sex: Female      Is Non- : Yes      Diabetic: No      Tobacco smoker: No      Systolic Blood  "Pressure: 168 mmHg      Is BP treated: Yes      HDL Cholesterol: 49 mg/dL      Total Cholesterol: 297 mg/dL          Reviewed and updated as needed this visit by Provider   Tobacco   Meds  Problems  Med Hx  Surg Hx  Fam Hx  Soc Hx Sexual   Activity                   Objective    Exam  BP (!) 168/98 (BP Location: Right arm, Patient Position: Sitting, Cuff Size: Adult Large)   Pulse 75   Temp 97.5  F (36.4  C) (Tympanic)   Resp 20   Ht 1.676 m (5' 6\")   Wt 99.8 kg (220 lb)   LMP 08/16/2021 (Approximate)   SpO2 98%   BMI 35.51 kg/m     Estimated body mass index is 35.51 kg/m  as calculated from the following:    Height as of this encounter: 1.676 m (5' 6\").    Weight as of this encounter: 99.8 kg (220 lb).    Physical Exam  Constitutional:       Appearance: Normal appearance. She is obese. She is not ill-appearing.   HENT:      Right Ear: Tympanic membrane, ear canal and external ear normal.      Left Ear: Tympanic membrane, ear canal and external ear normal.      Nose: Nose normal. No congestion.      Mouth/Throat:      Mouth: Mucous membranes are moist.      Pharynx: Oropharynx is clear.   Eyes:      Pupils: Pupils are equal, round, and reactive to light.   Cardiovascular:      Rate and Rhythm: Normal rate and regular rhythm.      Pulses: Normal pulses.      Heart sounds: Murmur heard.      Systolic murmur is present with a grade of 1/6.      No friction rub. No gallop.   Pulmonary:      Effort: Pulmonary effort is normal.      Breath sounds: Normal breath sounds.   Abdominal:      General: Bowel sounds are normal. There is no distension.      Palpations: Abdomen is soft. There is no mass.      Tenderness: There is no abdominal tenderness. There is no guarding or rebound.      Hernia: No hernia is present.   Musculoskeletal:         General: Normal range of motion.      Cervical back: Normal range of motion.   Lymphadenopathy:      Cervical: No cervical adenopathy.   Skin:     General: Skin is warm and " dry.      Findings: Rash present. Rash is papular.          Neurological:      General: No focal deficit present.      Mental Status: She is alert and oriented to person, place, and time.   Psychiatric:         Mood and Affect: Mood normal.         Behavior: Behavior normal.         Thought Content: Thought content normal.         Judgment: Judgment normal.               Signed Electronically by: CHIDI Matute CNP

## 2025-02-20 ENCOUNTER — PATIENT OUTREACH (OUTPATIENT)
Dept: CARE COORDINATION | Facility: CLINIC | Age: 60
End: 2025-02-20
Payer: COMMERCIAL

## 2025-02-20 LAB
ALBUMIN SERPL BCG-MCNC: 4.5 G/DL (ref 3.5–5.2)
ALP SERPL-CCNC: 79 U/L (ref 40–150)
ALT SERPL W P-5'-P-CCNC: 19 U/L (ref 0–50)
ANION GAP SERPL CALCULATED.3IONS-SCNC: 12 MMOL/L (ref 7–15)
AST SERPL W P-5'-P-CCNC: 22 U/L (ref 0–45)
BILIRUB SERPL-MCNC: 0.3 MG/DL
BUN SERPL-MCNC: 9.4 MG/DL (ref 8–23)
CALCIUM SERPL-MCNC: 9.6 MG/DL (ref 8.8–10.4)
CHLORIDE SERPL-SCNC: 101 MMOL/L (ref 98–107)
CHOLEST SERPL-MCNC: 297 MG/DL
CREAT SERPL-MCNC: 0.88 MG/DL (ref 0.51–0.95)
EGFRCR SERPLBLD CKD-EPI 2021: 75 ML/MIN/1.73M2
FASTING STATUS PATIENT QL REPORTED: NO
FASTING STATUS PATIENT QL REPORTED: NO
FERRITIN SERPL-MCNC: 111 NG/ML (ref 11–328)
GLUCOSE SERPL-MCNC: 98 MG/DL (ref 70–99)
HCO3 SERPL-SCNC: 28 MMOL/L (ref 22–29)
HDLC SERPL-MCNC: 49 MG/DL
LDLC SERPL CALC-MCNC: 211 MG/DL
NONHDLC SERPL-MCNC: 248 MG/DL
POTASSIUM SERPL-SCNC: 3.9 MMOL/L (ref 3.4–5.3)
PROT SERPL-MCNC: 7.5 G/DL (ref 6.4–8.3)
SODIUM SERPL-SCNC: 141 MMOL/L (ref 135–145)
TRIGL SERPL-MCNC: 185 MG/DL
TSH SERPL DL<=0.005 MIU/L-ACNC: 1.69 UIU/ML (ref 0.3–4.2)
VIT D+METAB SERPL-MCNC: 11 NG/ML (ref 20–50)

## 2025-03-04 ENCOUNTER — TELEPHONE (OUTPATIENT)
Dept: GASTROENTEROLOGY | Facility: CLINIC | Age: 60
End: 2025-03-04
Payer: COMMERCIAL

## 2025-03-04 NOTE — TELEPHONE ENCOUNTER
"Endoscopy Scheduling Screen    Have you had any respiratory illness or flu-like symptoms in the last 10 days?  No    What is your communication preference for Instructions and/or Bowel Prep?   MyChart    What insurance is in the chart?  Other:  [employer]    Ordering/Referring Provider: LUCIANA HENRY    (If ordering provider performs procedure, schedule with ordering provider unless otherwise instructed. )    BMI: Estimated body mass index is 35.51 kg/m  as calculated from the following:    Height as of 2/19/25: 1.676 m (5' 6\").    Weight as of 2/19/25: 99.8 kg (220 lb).     Sedation Ordered  moderate sedation.   If patient BMI > 50 do not schedule in [facility].    If patient BMI > 45 do not schedule at [facility].    Are you taking methadone or Suboxone?  NO, No RN review required.    Have you been diagnosed and are being treated for severe PTSD or severe anxiety?  NO, No RN review required.    Are you taking any prescription medications for pain 3 or more times per week?   NO, No RN review required.    Do you have a history of malignant hyperthermia?  No    (Females) Are you currently pregnant?   No     Have you been diagnosed or told you have pulmonary hypertension?   No    Do you have an LVAD?  No    Have you been told you have moderate to severe sleep apnea?  No.    Have you been told you have COPD, asthma, or any other lung disease?  Yes     What breathing problems do you have?  Asthma     Do you use home oxygen?  No    Have your breathing problems required an ED visit or hospitalization in the last year?  No.    Has your doctor ordered any cardiac tests like echo, angiogram, stress test, ablation, or EKG, that you have not completed yet?  No    Do you  have a history of any heart conditions?  No     Have you ever had or are you waiting for an organ transplant?  No. Continue scheduling, no site restrictions.    Have you had a stroke or transient ischemic attack (TIA aka \"mini stroke\") in the last 2 years?   No.    Have " "you been diagnosed with or been told you have cirrhosis of the liver?   No.    Are you currently on dialysis?   No    Do you need assistance transferring?   No    BMI: Estimated body mass index is 35.51 kg/m  as calculated from the following:    Height as of 2/19/25: 1.676 m (5' 6\").    Weight as of 2/19/25: 99.8 kg (220 lb).     Is patients BMI > 40 and scheduling location UP?  No    Do you take an injectable or oral medication for weight loss or diabetes (excluding insulin)?  No    Do you take the medication Naltrexone?  No    Do you take blood thinners?  No       Prep   Are you currently on dialysis or do you have chronic kidney disease?  No    Do you have a diagnosis of diabetes?  No    Do you have a diagnosis of cystic fibrosis (CF)?  No    On a regular basis do you go 3 -5 days between bowel movements?  Yes (Extended Prep)    BMI > 40?  No    Preferred Pharmacy:    [facility] [place], [place] - [address] [address]  [address] [address] [address]  [facility] [id number]  Phone: [phone] Fax: [phone]      Final Scheduling Details     Procedure scheduled  Colonoscopy    Surgeon:  JOSEPHINE     Date of procedure:  3/20/25     Pre-OP / PAC:   No - Not required for this site.    Location  [facility] - [facility] - Patient preference.    Sedation   Moderate Sedation - Per order.      Patient Reminders:   You will receive a call from a Nurse to review instructions and health history.  This assessment must be completed prior to your procedure.  Failure to complete the Nurse assessment may result in the procedure being cancelled.      On the day of your procedure, please designate an adult(s) who can drive you home stay with you for the next 24 hours. The medicines used in the exam will make you sleepy. You will not be able to drive.      You cannot take public transportation, ride share services, or non-medical taxi service without a responsible caregiver.  Medical transport services are allowed with the requirement " that a responsible caregiver will receive you at your destination.  We require that drivers and caregivers are confirmed prior to your procedure.

## 2025-03-05 ENCOUNTER — TELEPHONE (OUTPATIENT)
Dept: GASTROENTEROLOGY | Facility: CLINIC | Age: 60
End: 2025-03-05
Payer: COMMERCIAL

## 2025-03-05 DIAGNOSIS — Z12.11 SPECIAL SCREENING FOR MALIGNANT NEOPLASMS, COLON: Primary | ICD-10-CM

## 2025-03-05 RX ORDER — BISACODYL 5 MG/1
TABLET, DELAYED RELEASE ORAL
Qty: 4 TABLET | Refills: 0 | Status: SHIPPED | OUTPATIENT
Start: 2025-03-05

## 2025-03-05 NOTE — TELEPHONE ENCOUNTER
Pre visit planning completed.      Procedure details:    Patient scheduled for Colonoscopy on 3/20/25.     Approximate arrival time: 0830. Procedure time 0915.   *Ensure patient is aware that endoscopy team will be calling about 2 days prior to procedure date to confirm arrival time as this may change.     Facility location: [facility]; [address], [place] [address]. Check in location: 2nd Floor at Surgery desk.  *Disclaimer: Drivers are to check in with patient and stay on campus during procedure.     Sedation type: Conscious sedation     Pre op exam needed? No.    Indication for procedure: screening      Chart review:     Electronic implanted devices? No    Recent diagnosis of diverticulitis within the last 6 weeks? No      Medication review:    Diabetic? No    Anticoagulants? No    Weight loss medication/injectable? No GLP-1 medication per patient's medication list. Nursing to verify with pre-assessment call.    Other medication HOLDING recommendations:  N/A      Prep for procedure:     Bowel prep recommendation: Extended Golytely. Bowel prep sent to [facility] [place], [place] - [address] [facility].  Due to: constipation noted or reported    Procedure information and instructions sent via [employer]         Corinne Kliber, RN  Endoscopy Procedure Pre Assessment   [phone] option 3

## 2025-03-05 NOTE — TELEPHONE ENCOUNTER
Pre assessment completed for upcoming procedure.   (Please see previous telephone encounter notes for complete details)    I called and spoke with patient      Procedure details:    Approximate time and facility location reviewed.   Patient is aware that endoscopy team will be calling about 2 days prior to confirm arrival time.    Designated  policy reviewed and that site requests drivers to check in and stay on campus. Instructed to have someone stay 6  hours post procedure.   *Disclaimer - please notify the [facility] RN GI staff with any  issues/concerns.    Medication review:    Medications reviewed. Please see supporting documentation below. Holding recommendations discussed (if applicable).       Prep for procedure:     Procedure prep instructions reviewed.        Any additional information needed:  N/A      Patient verbalized understanding and had no questions or concerns at this time.      Stephanie Robison LPN  Endoscopy Procedure Pre Assessment   [phone] option 3

## 2025-03-18 ENCOUNTER — TELEPHONE (OUTPATIENT)
Dept: GASTROENTEROLOGY | Facility: CLINIC | Age: 60
End: 2025-03-18
Payer: COMMERCIAL

## 2025-03-20 ENCOUNTER — HOSPITAL ENCOUNTER (OUTPATIENT)
Facility: AMBULATORY SURGERY CENTER | Age: 60
Discharge: HOME | End: 2025-03-20
Attending: STUDENT IN AN ORGANIZED HEALTH CARE EDUCATION/TRAINING PROGRAM | Admitting: STUDENT IN AN ORGANIZED HEALTH CARE EDUCATION/TRAINING PROGRAM
Payer: COMMERCIAL

## 2025-03-20 VITALS
RESPIRATION RATE: 18 BRPM | OXYGEN SATURATION: 99 % | HEART RATE: 75 BPM | SYSTOLIC BLOOD PRESSURE: 115 MMHG | TEMPERATURE: 96.7 F | DIASTOLIC BLOOD PRESSURE: 76 MMHG

## 2025-03-20 LAB — COLONOSCOPY: NORMAL

## 2025-03-20 RX ORDER — ONDANSETRON 2 MG/ML
4 INJECTION INTRAMUSCULAR; INTRAVENOUS
Status: ACTIVE | OUTPATIENT
Start: 2025-03-20

## 2025-03-20 RX ORDER — NALOXONE HYDROCHLORIDE 0.4 MG/ML
0.4 INJECTION, SOLUTION INTRAMUSCULAR; INTRAVENOUS; SUBCUTANEOUS
Status: ACTIVE | OUTPATIENT
Start: 2025-03-20

## 2025-03-20 RX ORDER — FENTANYL CITRATE 50 UG/ML
25-100 INJECTION, SOLUTION INTRAMUSCULAR; INTRAVENOUS EVERY 5 MIN PRN
Status: ACTIVE | OUTPATIENT
Start: 2025-03-20

## 2025-03-20 RX ORDER — FLUMAZENIL 0.1 MG/ML
0.2 INJECTION, SOLUTION INTRAVENOUS
Status: ACTIVE | OUTPATIENT
Start: 2025-03-20

## 2025-03-20 RX ORDER — FENTANYL CITRATE 50 UG/ML
INJECTION, SOLUTION INTRAMUSCULAR; INTRAVENOUS PRN
Status: DISCONTINUED | OUTPATIENT
Start: 2025-03-20 | End: 2025-03-20 | Stop reason: HOSPADM

## 2025-03-20 RX ORDER — SIMETHICONE 40MG/0.6ML
133 SUSPENSION, DROPS(FINAL DOSAGE FORM)(ML) ORAL
Status: ACTIVE | OUTPATIENT
Start: 2025-03-20

## 2025-03-20 RX ORDER — DIPHENHYDRAMINE HYDROCHLORIDE 50 MG/ML
25-50 INJECTION, SOLUTION INTRAMUSCULAR; INTRAVENOUS
Status: ACTIVE | OUTPATIENT
Start: 2025-03-20

## 2025-03-20 RX ORDER — EPINEPHRINE 1 MG/ML
0.1 INJECTION, SOLUTION INTRAMUSCULAR; SUBCUTANEOUS
Status: ACTIVE | OUTPATIENT
Start: 2025-03-20

## 2025-03-20 RX ORDER — ONDANSETRON 2 MG/ML
4 INJECTION INTRAMUSCULAR; INTRAVENOUS EVERY 6 HOURS PRN
Status: ACTIVE | OUTPATIENT
Start: 2025-03-20

## 2025-03-20 RX ORDER — PROCHLORPERAZINE MALEATE 10 MG
10 TABLET ORAL EVERY 6 HOURS PRN
Status: DISPENSED | OUTPATIENT
Start: 2025-03-20

## 2025-03-20 RX ORDER — FLUMAZENIL 0.1 MG/ML
0.2 INJECTION, SOLUTION INTRAVENOUS
Status: ACTIVE | OUTPATIENT
Start: 2025-03-20 | End: 2025-03-20

## 2025-03-20 RX ORDER — NALOXONE HYDROCHLORIDE 0.4 MG/ML
0.2 INJECTION, SOLUTION INTRAMUSCULAR; INTRAVENOUS; SUBCUTANEOUS
Status: ACTIVE | OUTPATIENT
Start: 2025-03-20

## 2025-03-20 RX ORDER — ONDANSETRON 4 MG/1
4 TABLET, ORALLY DISINTEGRATING ORAL EVERY 6 HOURS PRN
Status: ACTIVE | OUTPATIENT
Start: 2025-03-20

## 2025-03-20 RX ORDER — ATROPINE SULFATE 0.1 MG/ML
1 INJECTION INTRAVENOUS
Status: DISPENSED | OUTPATIENT
Start: 2025-03-20

## 2025-03-20 RX ORDER — LIDOCAINE 40 MG/G
CREAM TOPICAL
Status: DISPENSED | OUTPATIENT
Start: 2025-03-20

## 2025-08-27 ENCOUNTER — PATIENT OUTREACH (OUTPATIENT)
Dept: CARE COORDINATION | Facility: CLINIC | Age: 60
End: 2025-08-27
Payer: COMMERCIAL

## (undated) RX ORDER — FENTANYL CITRATE 50 UG/ML
INJECTION, SOLUTION INTRAMUSCULAR; INTRAVENOUS
Status: DISPENSED
Start: 2025-03-20

## (undated) RX ORDER — LIDOCAINE HYDROCHLORIDE 10 MG/ML
INJECTION, SOLUTION EPIDURAL; INFILTRATION; INTRACAUDAL; PERINEURAL
Status: DISPENSED
Start: 2020-12-11

## (undated) RX ORDER — LIDOCAINE HYDROCHLORIDE 10 MG/ML
INJECTION, SOLUTION EPIDURAL; INFILTRATION; INTRACAUDAL; PERINEURAL
Status: DISPENSED
Start: 2021-11-10

## (undated) RX ORDER — DEXAMETHASONE SODIUM PHOSPHATE 4 MG/ML
INJECTION, SOLUTION INTRA-ARTICULAR; INTRALESIONAL; INTRAMUSCULAR; INTRAVENOUS; SOFT TISSUE
Status: DISPENSED
Start: 2020-12-11

## (undated) RX ORDER — TRIAMCINOLONE ACETONIDE 40 MG/ML
INJECTION, SUSPENSION INTRA-ARTICULAR; INTRAMUSCULAR
Status: DISPENSED
Start: 2020-12-11

## (undated) RX ORDER — DEXAMETHASONE SODIUM PHOSPHATE 4 MG/ML
INJECTION, SOLUTION INTRA-ARTICULAR; INTRALESIONAL; INTRAMUSCULAR; INTRAVENOUS; SOFT TISSUE
Status: DISPENSED
Start: 2021-11-10